# Patient Record
Sex: MALE | Race: WHITE | NOT HISPANIC OR LATINO | Employment: FULL TIME | URBAN - METROPOLITAN AREA
[De-identification: names, ages, dates, MRNs, and addresses within clinical notes are randomized per-mention and may not be internally consistent; named-entity substitution may affect disease eponyms.]

---

## 2017-08-14 ENCOUNTER — TELEPHONE (OUTPATIENT)
Dept: FAMILY MEDICINE | Facility: CLINIC | Age: 37
End: 2017-08-14

## 2017-08-14 DIAGNOSIS — K21.9 GASTROESOPHAGEAL REFLUX DISEASE WITHOUT ESOPHAGITIS: ICD-10-CM

## 2017-08-14 RX ORDER — OMEPRAZOLE 40 MG/1
40 CAPSULE, DELAYED RELEASE ORAL DAILY
Qty: 90 CAPSULE | Refills: 0 | Status: SHIPPED | OUTPATIENT
Start: 2017-08-14 | End: 2017-08-15

## 2017-08-15 ENCOUNTER — OFFICE VISIT (OUTPATIENT)
Dept: FAMILY MEDICINE | Facility: CLINIC | Age: 37
End: 2017-08-15
Payer: COMMERCIAL

## 2017-08-15 VITALS
RESPIRATION RATE: 16 BRPM | HEIGHT: 72 IN | HEART RATE: 78 BPM | SYSTOLIC BLOOD PRESSURE: 110 MMHG | TEMPERATURE: 97 F | WEIGHT: 210 LBS | BODY MASS INDEX: 28.44 KG/M2 | DIASTOLIC BLOOD PRESSURE: 70 MMHG

## 2017-08-15 DIAGNOSIS — K21.9 GASTROESOPHAGEAL REFLUX DISEASE WITHOUT ESOPHAGITIS: ICD-10-CM

## 2017-08-15 PROCEDURE — 99213 OFFICE O/P EST LOW 20 MIN: CPT | Performed by: FAMILY MEDICINE

## 2017-08-15 RX ORDER — OMEPRAZOLE 40 MG/1
40 CAPSULE, DELAYED RELEASE ORAL DAILY
Qty: 90 CAPSULE | Refills: 3 | Status: SHIPPED | OUTPATIENT
Start: 2017-08-15 | End: 2018-02-27

## 2017-08-15 NOTE — MR AVS SNAPSHOT
After Visit Summary   8/15/2017    Nahum Chiu    MRN: [id number]           Patient Information     Date Of Birth          1980        Visit Information        Provider Department      8/15/2017 3:45 PM Carlo Hudson MD [facility]        Today's Diagnoses     Gastroesophageal reflux disease without esophagitis          Care Instructions      I refilled the patient's omeprazole 40 mg #90 tablets with 3 refills.  We'll follow-up as needed.          Follow-ups after your visit        Who to contact     If you have questions or need follow up information about today's clinic visit or your schedule please contact [facility] directly at [phone].  Normal or non-critical lab and imaging results will be communicated to you by MyChart, letter or phone within 4 business days after the clinic has received the results. If you do not hear from us within 7 days, please contact the clinic through [employer]hart or phone. If you have a critical or abnormal lab result, we will notify you by phone as soon as possible.  Submit refill requests through [employer]one or call your pharmacy and they will forward the refill request to us. Please allow 3 business days for your refill to be completed.          Additional Information About Your Visit        MyChart Information     [employer]sixtyone gives you secure access to your electronic health record. If you see a primary care provider, you can also send messages to your care team and make appointments. If you have questions, please call your primary care clinic.  If you do not have a primary care provider, please call [phone] and they will assist you.        Care EveryWhere ID     This is your Care EveryWhere ID. This could be used by other organizations to access your [facility] medical records  [id number]        Your Vitals Were     Pulse Temperature Respirations Height BMI (Body Mass Index)       78 97  F  "(36.1  C) (Tympanic) 16 5' 11.5\" (1.816 m) 28.88 kg/m2        Blood Pressure from Last 3 Encounters:   08/15/17 110/70   05/09/16 124/64   02/15/16 120/70    Weight from Last 3 Encounters:   08/15/17 210 lb (95.3 kg)   05/09/16 210 lb (95.3 kg)   02/15/16 210 lb (95.3 kg)              Today, you had the following     No orders found for display         Where to get your medicines      These medications were sent to [facility]/pharmacy #[id number] - [place], [place] - [address]  [address] [facility] [address]     Phone:  [phone]     omeprazole 40 MG capsule          Primary Care Provider Office Phone # Fax #    Carlo Hudson [phone] [address] [facility] [address]        Equal Access to Services     [facility] Center[facility]AREN : Hadii aad ku hadasho Soomaali, waaxda luqadaha, qaybta kaalmada adeegyada, waxay idiin hayfareedn dot garcia . So [facility] [phone].    ATENCIÓN: Si habla español, tiene a poole disposición servicios gratuitos de asistencia lingüística. Llame al [phone].    We comply with applicable federal civil rights laws and [place] laws. We do not discriminate on the basis of race, color, national origin, age, disability sex, sexual orientation or gender identity.            Thank you!     Thank you for choosing [facility]  for your care. Our goal is always to provide you with excellent care. Hearing back from our patients is one way we can continue to improve our services. Please take a few minutes to complete the written survey that you may receive in the mail after your visit with us. Thank you!             Your Updated Medication List - Protect others around you: Learn how to safely use, store and throw away your medicines at www.disposemymeds.org.          This list is accurate as of: 8/15/17  5:29 PM.  Always use your most recent med list.                   Brand Name Dispense Instructions for use Diagnosis    omeprazole 40 MG " capsule    priLOSEC    90 capsule    Take 1 capsule (40 mg) by mouth daily    Gastroesophageal reflux disease without esophagitis

## 2017-08-15 NOTE — NURSING NOTE
"Chief Complaint   Patient presents with     Gastrophageal Reflux       Initial /70  Pulse 78  Temp 97  F (36.1  C) (Tympanic)  Resp 16  Ht 5' 11.5\" (1.816 m)  Wt 210 lb (95.3 kg)  BMI 28.88 kg/m2 Estimated body mass index is 28.88 kg/(m^2) as calculated from the following:    Height as of this encounter: 5' 11.5\" (1.816 m).    Weight as of this encounter: 210 lb (95.3 kg).  Medication Reconciliation: complete     Isabela Childs CMA      "

## 2017-08-15 NOTE — PATIENT INSTRUCTIONS
I refilled the patient's omeprazole 40 mg #90 tablets with 3 refills.  We'll follow-up as needed.

## 2017-08-15 NOTE — PROGRESS NOTES
SUBJECTIVE:                                                    Nahum Chiu is a 37 year old male who presents to clinic today for the following health issues:      GERD/Heartburn      Duration: on Omeprazole for past 15 years    Description (location/character/radiation): gerd    Intensity:  moderate    Accompanying signs and symptoms:  food getting stuck: no   nausea/vomiting/blood: no   abdominal pain: no   black/tarry or bloody stools: no :    History (similar episodes/previous evaluation): None    Precipitating or alleviating factors:  worse with no particular food or drink.  current NSAID/Aspirin use: no     Therapies tried and outcome: Omeprazole (Prilosec)            Problem list and histories reviewed & adjusted, as indicated.  Additional history: The patient did get his upper endoscopy done.  That was unremarkable.  His mother had convinced him to try ranitidine instead of omeprazole.  That didn't work as well, so when he went to the GI specialist he inquired about the use of PPIs chronically.  The GI specialist told him to go back on omeprazole and could use it chronically.    Patient Active Problem List   Diagnosis     GERD (gastroesophageal reflux disease)     Shoulder pain, right     History reviewed. No pertinent surgical history.    Social History   Substance Use Topics     Smoking status: Former Smoker     Smokeless tobacco: Never Used     Alcohol use Yes     Family History   Problem Relation Age of Onset     DIABETES No family hx of              Reviewed and updated as needed this visit by clinical staffTobacco  Allergies  Meds  Med Hx  Surg Hx  Fam Hx  Soc Hx      Reviewed and updated as needed this visit by Provider         ROS:  Constitutional, HEENT, cardiovascular, pulmonary, gi and gu systems are negative, except as otherwise noted.  CONSTITUTIONAL:NEGATIVE for fever, chills, change in weight  GI: NEGATIVE for nausea, abdominal pain, heartburn, or change in bowel  "habits      OBJECTIVE:                                                    /70  Pulse 78  Temp 97  F (36.1  C) (Tympanic)  Resp 16  Ht 5' 11.5\" (1.816 m)  Wt 210 lb (95.3 kg)  BMI 28.88 kg/m2  Body mass index is 28.88 kg/(m^2).  GENERAL APPEARANCE: healthy, alert and no distress         ASSESSMENT/PLAN:                                                        ICD-10-CM    1. Gastroesophageal reflux disease without esophagitis K21.9 omeprazole (PRILOSEC) 40 MG capsule       Patient Instructions     I refilled the patient's omeprazole 40 mg #90 tablets with 3 refills.  We'll follow-up as needed.      Carlo Hudson MD  [facility]    "

## 2017-11-18 DIAGNOSIS — K21.9 GASTROESOPHAGEAL REFLUX DISEASE WITHOUT ESOPHAGITIS: ICD-10-CM

## 2017-11-20 RX ORDER — OMEPRAZOLE 40 MG/1
CAPSULE, DELAYED RELEASE ORAL
Qty: 90 CAPSULE | Refills: 2 | Status: SHIPPED | OUTPATIENT
Start: 2017-11-20 | End: 2018-09-01

## 2018-02-26 ENCOUNTER — MYC MEDICAL ADVICE (OUTPATIENT)
Dept: FAMILY MEDICINE | Facility: CLINIC | Age: 38
End: 2018-02-26

## 2018-02-26 NOTE — TELEPHONE ENCOUNTER
Pt denies chest pain, breathing problems, dizziness or fatigue.Future appt was scheduled. Advised he limit caffeine, nicotine, or alcohol that may result on tachycardia.   Asked that pt seek emergent care if any of the symptoms above.

## 2018-02-27 ENCOUNTER — OFFICE VISIT (OUTPATIENT)
Dept: FAMILY MEDICINE | Facility: CLINIC | Age: 38
End: 2018-02-27
Payer: COMMERCIAL

## 2018-02-27 VITALS
RESPIRATION RATE: 14 BRPM | SYSTOLIC BLOOD PRESSURE: 118 MMHG | TEMPERATURE: 97.2 F | BODY MASS INDEX: 28.47 KG/M2 | HEART RATE: 78 BPM | WEIGHT: 207 LBS | DIASTOLIC BLOOD PRESSURE: 78 MMHG

## 2018-02-27 DIAGNOSIS — R00.0 TACHYCARDIA: Primary | ICD-10-CM

## 2018-02-27 LAB
BASOPHILS # BLD AUTO: 0 10E9/L (ref 0–0.2)
BASOPHILS NFR BLD AUTO: 0.4 %
DIFFERENTIAL METHOD BLD: NORMAL
EOSINOPHIL # BLD AUTO: 0.4 10E9/L (ref 0–0.7)
EOSINOPHIL NFR BLD AUTO: 6.3 %
ERYTHROCYTE [DISTWIDTH] IN BLOOD BY AUTOMATED COUNT: 12.9 % (ref 10–15)
HCT VFR BLD AUTO: 46.6 % (ref 40–53)
HGB BLD-MCNC: 15.8 G/DL (ref 13.3–17.7)
LYMPHOCYTES # BLD AUTO: 1.5 10E9/L (ref 0.8–5.3)
LYMPHOCYTES NFR BLD AUTO: 27 %
MCH RBC QN AUTO: 31 PG (ref 26.5–33)
MCHC RBC AUTO-ENTMCNC: 33.9 G/DL (ref 31.5–36.5)
MCV RBC AUTO: 92 FL (ref 78–100)
MONOCYTES # BLD AUTO: 0.4 10E9/L (ref 0–1.3)
MONOCYTES NFR BLD AUTO: 7.2 %
NEUTROPHILS # BLD AUTO: 3.4 10E9/L (ref 1.6–8.3)
NEUTROPHILS NFR BLD AUTO: 59.1 %
PLATELET # BLD AUTO: 197 10E9/L (ref 150–450)
RBC # BLD AUTO: 5.09 10E12/L (ref 4.4–5.9)
WBC # BLD AUTO: 5.7 10E9/L (ref 4–11)

## 2018-02-27 PROCEDURE — 84443 ASSAY THYROID STIM HORMONE: CPT | Performed by: FAMILY MEDICINE

## 2018-02-27 PROCEDURE — 36415 COLL VENOUS BLD VENIPUNCTURE: CPT | Performed by: FAMILY MEDICINE

## 2018-02-27 PROCEDURE — 99213 OFFICE O/P EST LOW 20 MIN: CPT | Performed by: FAMILY MEDICINE

## 2018-02-27 PROCEDURE — 84439 ASSAY OF FREE THYROXINE: CPT | Performed by: FAMILY MEDICINE

## 2018-02-27 PROCEDURE — 85025 COMPLETE CBC W/AUTO DIFF WBC: CPT | Performed by: FAMILY MEDICINE

## 2018-02-27 NOTE — MR AVS SNAPSHOT
After Visit Summary   2/27/2018    Nahum Chiu    MRN: [id number]           Patient Information     Date Of Birth          1980        Visit Information        Provider Department      2/27/2018 10:30 AM Carlo Hudson MD [facility]        Today's Diagnoses     Tachycardia    -  1      Care Instructions    Will check labs and see back if symptoms persist.          Follow-ups after your visit        Who to contact     If you have questions or need follow up information about today's clinic visit or your schedule please contact [facility] directly at [phone].  Normal or non-critical lab and imaging results will be communicated to you by MyChart, letter or phone within 4 business days after the clinic has received the results. If you do not hear from us within 7 days, please contact the clinic through [employer] or phone. If you have a critical or abnormal lab result, we will notify you by phone as soon as possible.  Submit refill requests through [employer] or call your pharmacy and they will forward the refill request to us. Please allow 3 business days for your refill to be completed.          Additional Information About Your Visit        MyChart Information     [employer] gives you secure access to your electronic health record. If you see a primary care provider, you can also send messages to your care team and make appointments. If you have questions, please call your primary care clinic.  If you do not have a primary care provider, please call [phone] and they will assist you.        Care EveryWhere ID     This is your Care EveryWhere ID. This could be used by other organizations to access your [facility] medical records  [id number]        Your Vitals Were     Pulse Temperature Respirations BMI (Body Mass Index)          78 97.2  F (36.2  C) (Tympanic) 14 28.47 kg/m2         Blood Pressure from Last 3 Encounters:    02/27/18 118/78   08/15/17 110/70   05/09/16 124/64    Weight from Last 3 Encounters:   02/27/18 207 lb (93.9 kg)   08/15/17 210 lb (95.3 kg)   05/09/16 210 lb (95.3 kg)              We Performed the Following     CBC with platelets differential     T4, free     TSH          Today's Medication Changes          These changes are accurate as of 2/27/18 11:13 AM.  If you have any questions, ask your nurse or doctor.               These medicines have changed or have updated prescriptions.        Dose/Directions    omeprazole 40 MG capsule   Commonly known as:  priLOSEC   This may have changed:  Another medication with the same name was removed. Continue taking this medication, and follow the directions you see here.   Used for:  Gastroesophageal reflux disease without esophagitis   Changed by:  Carlo Hudson MD        TAKE 1 CAPSULE (40 MG) BY MOUTH DAILY   Quantity:  90 capsule   Refills:  2                Primary Care Provider Office Phone # Fax #    Carlo Hudson [phone]       [address] [address]        Equal Access to Services     [facility]EN : Hadii luis m ku hadasho Soomaali, waaxda luqadaha, qaybta kaalmada timothy, ranjith garcia . So [facility] [phone].    ATENCIÓN: Si habla español, tiene a poole disposición servicios gratuitos de asistencia lingüística. Stephon al [phone].    We comply with applicable federal civil rights laws and [place] laws. We do not discriminate on the basis of race, color, national origin, age, disability, sex, sexual orientation, or gender identity.            Thank you!     Thank you for choosing [facility]  for your care. Our goal is always to provide you with excellent care. Hearing back from our patients is one way we can continue to improve our services. Please take a few minutes to complete the written survey that you may receive in the mail after your visit  with us. Thank you!             Your Updated Medication List - Protect others around you: Learn how to safely use, store and throw away your medicines at www.disposemymeds.org.          This list is accurate as of 2/27/18 11:13 AM.  Always use your most recent med list.                   Brand Name Dispense Instructions for use Diagnosis    omeprazole 40 MG capsule    priLOSEC    90 capsule    TAKE 1 CAPSULE (40 MG) BY MOUTH DAILY    Gastroesophageal reflux disease without esophagitis

## 2018-02-27 NOTE — NURSING NOTE
"Chief Complaint   Patient presents with     Palpitations     tachycardia        Initial /78  Pulse 78  Temp 97.2  F (36.2  C) (Tympanic)  Resp 14  Wt 207 lb (93.9 kg)  BMI 28.47 kg/m2 Estimated body mass index is 28.47 kg/(m^2) as calculated from the following:    Height as of 8/15/17: 5' 11.5\" (1.816 m).    Weight as of this encounter: 207 lb (93.9 kg).  Medication Reconciliation: complete   Edda David MA student     "

## 2018-02-27 NOTE — PROGRESS NOTES
SUBJECTIVE:   Nahum Chiu is a 37 year old male who presents to clinic today for the following health issues:      Palpitations/Tachycardia       Duration: 1 week    Description (location/character/radiation): pulse per fitness watch 110-130 even while sleeping    Intensity:  mild    Accompanying signs and symptoms: chest pains about a week ago while at work    History (similar episodes/previous evaluation): None    Precipitating or alleviating factors: None    Therapies tried and outcome: None           Problem list and histories reviewed & adjusted, as indicated.  Additional history: as documented    Patient Active Problem List   Diagnosis     GERD (gastroesophageal reflux disease)     Shoulder pain, right     History reviewed. No pertinent surgical history.    Social History   Substance Use Topics     Smoking status: Former Smoker     Smokeless tobacco: Never Used     Alcohol use Yes     Family History   Problem Relation Age of Onset     DIABETES No family hx of            Reviewed and updated as needed this visit by clinical staff  Tobacco  Allergies  Meds  Med Hx  Surg Hx  Fam Hx  Soc Hx      Reviewed and updated as needed this visit by Provider         ROS:  Constitutional, HEENT, cardiovascular, pulmonary, gi and gu systems are negative, except as otherwise noted.    OBJECTIVE:                                                    /78  Pulse 78  Temp 97.2  F (36.2  C) (Tympanic)  Resp 14  Wt 207 lb (93.9 kg)  BMI 28.47 kg/m2  Body mass index is 28.47 kg/(m^2).  GENERAL APPEARANCE: healthy, alert and no distress  RESP: lungs clear to auscultation - no rales, rhonchi or wheezes  CV: regular rates and rhythm, normal S1 S2, no S3 or S4 and no murmur, click or rub         ASSESSMENT/PLAN:                                                        ICD-10-CM    1. Tachycardia R00.0 CBC with platelets differential     TSH     T4, free       Patient Instructions   Will check labs and see back if symptoms  persist.      Carlo Hudson MD  [facility]

## 2018-02-28 LAB
T4 FREE SERPL-MCNC: 0.88 NG/DL (ref 0.76–1.46)
TSH SERPL DL<=0.005 MIU/L-ACNC: 1.55 MU/L (ref 0.4–4)

## 2018-03-01 NOTE — PROGRESS NOTES
Dear Nahum,    Your tests were all normal. A copy of your tests are available in My Chart.      If your problems persist please let me know and we will do further testing.    Glad to see you at your appointment.  If you have any questions feel free to call.      Sincerely,      SHUN Adan.

## 2018-09-01 DIAGNOSIS — K21.9 GASTROESOPHAGEAL REFLUX DISEASE WITHOUT ESOPHAGITIS: ICD-10-CM

## 2018-09-01 NOTE — TELEPHONE ENCOUNTER
"Requested Prescriptions   Pending Prescriptions Disp Refills     omeprazole (PRILOSEC) 40 MG capsule [Pharmacy Med Name: OMEPRAZOLE DR 40 MG CAPSULE]  Last Written Prescription Date:  11/20/2017  Last Fill Quantity: 90,  # refills: 1   Last office visit: 2/27/2018 with prescribing provider:  CHELE   Future Office Visit:     90 capsule 2     Sig: TAKE 1 CAPSULE (40 MG) BY MOUTH DAILY    PPI Protocol Passed    9/1/2018  1:36 AM       Passed - Not on Clopidogrel (unless Pantoprazole ordered)       Passed - No diagnosis of osteoporosis on record       Passed - Recent (12 mo) or future (30 days) visit within the authorizing provider's specialty    Patient had office visit in the last 12 months or has a visit in the next 30 days with authorizing provider or within the authorizing provider's specialty.  See \"Patient Info\" tab in inbasket, or \"Choose Columns\" in Meds & Orders section of the refill encounter.           Passed - Patient is age 18 or older          "

## 2018-09-04 RX ORDER — OMEPRAZOLE 40 MG/1
CAPSULE, DELAYED RELEASE ORAL
Qty: 90 CAPSULE | Refills: 1 | Status: SHIPPED | OUTPATIENT
Start: 2018-09-04 | End: 2019-03-16

## 2019-03-16 DIAGNOSIS — K21.9 GASTROESOPHAGEAL REFLUX DISEASE WITHOUT ESOPHAGITIS: ICD-10-CM

## 2019-03-18 RX ORDER — OMEPRAZOLE 40 MG/1
CAPSULE, DELAYED RELEASE ORAL
Qty: 90 CAPSULE | Refills: 0 | Status: SHIPPED | OUTPATIENT
Start: 2019-03-18 | End: 2019-07-13

## 2019-03-18 NOTE — TELEPHONE ENCOUNTER
Medication is being filled for 1 time refill only due to:  Patient needs to be seen because it has been more than one year since last visit.     Kayla Lui RN- Triage [employer]

## 2019-03-18 NOTE — TELEPHONE ENCOUNTER
"Requested Prescriptions   Pending Prescriptions Disp Refills     omeprazole (PRILOSEC) 40 MG DR capsule [Pharmacy Med Name: OMEPRAZOLE DR 40 MG CAPSULE]  Last Written Prescription Date:  9/4/2018  Last Fill Quantity: 90 capsule,  # refills: 1   Last office visit: 2/27/2018 with prescribing provider:  Tristan   Future Office Visit:     90 capsule 1     Sig: TAKE 1 CAPSULE (40 MG) BY MOUTH DAILY    PPI Protocol Failed - 3/16/2019  8:30 AM       Failed - Recent (12 mo) or future (30 days) visit within the authorizing provider's specialty    Patient had office visit in the last 12 months or has a visit in the next 30 days with authorizing provider or within the authorizing provider's specialty.  See \"Patient Info\" tab in inbasket, or \"Choose Columns\" in Meds & Orders section of the refill encounter.             Passed - Not on Clopidogrel (unless Pantoprazole ordered)       Passed - No diagnosis of osteoporosis on record       Passed - Medication is active on med list       Passed - Patient is age 18 or older           "

## 2019-03-26 ENCOUNTER — OFFICE VISIT (OUTPATIENT)
Dept: FAMILY MEDICINE | Facility: CLINIC | Age: 39
End: 2019-03-26
Payer: COMMERCIAL

## 2019-03-26 VITALS
OXYGEN SATURATION: 97 % | HEART RATE: 54 BPM | SYSTOLIC BLOOD PRESSURE: 112 MMHG | BODY MASS INDEX: 29.53 KG/M2 | DIASTOLIC BLOOD PRESSURE: 76 MMHG | RESPIRATION RATE: 16 BRPM | WEIGHT: 218 LBS | TEMPERATURE: 97.4 F | HEIGHT: 72 IN

## 2019-03-26 DIAGNOSIS — G44.209 TENSION HEADACHE: Primary | ICD-10-CM

## 2019-03-26 PROCEDURE — 99213 OFFICE O/P EST LOW 20 MIN: CPT | Performed by: FAMILY MEDICINE

## 2019-03-26 ASSESSMENT — MIFFLIN-ST. JEOR: SCORE: 1936.84

## 2019-03-26 NOTE — PATIENT INSTRUCTIONS
We will treat symptomatically.  Patient will use extra strength Tylenol on an as-needed basis.  He may use up to 8 of those in a 24-hour..  This may have started as a caffeine withdrawal headache but I think it is now pretty classic tension type headache.  There is nothing on physical exam that would suggest other etiologies at present.  If the headaches persist and do not get well treated with acetaminophen the patient will return for further exam and testing.  The patient was instructed not to use ibuprofen due to his GERD.  That has been well controlled on omeprazole.

## 2019-03-26 NOTE — PROGRESS NOTES
SUBJECTIVE:   Nahum Chiu is a 38 year old male who presents to clinic today for the following health issues:      Headaches      Duration: a couple weeks    Description  Location: back of head   Character:  dull pain  Frequency:  Daily   Duration:  hours    Intensity:  moderate    Accompanying signs and symptoms: cloudiness     Precipitating or Alleviating factors:  Nausea/vomiting: no  Dizziness: sometimes  Weakness or numbness: no  Visual changes: cloudiness  Fever: no   Sinus or URI symptoms no     History  Head trauma: no  Family history of migraines: no  Previous tests for headaches: no  Neurologist evaluations: no  Able to do daily activities when headache present: YES  Wake with headaches: YES  Daily pain medication use: no  Any changes in: pt cut back on caffine    Precipitating or Alleviating factors (light/sound/sleep/caffeine):     Therapies tried and outcome: Ibuprofen (Advil, Motrin), Tylenol and Excedrin    Outcome -may be helpful for little bit for like a few hours.  Frequent/daily pain medication use: no        GERD/Heartburn      Duration: A couple of years    Description (location/character/radiation): Midepigastrium    Intensity:  mild    Accompanying signs and symptoms:  food getting stuck: no   nausea/vomiting/blood: no   abdominal pain: no   black/tarry or bloody stools: no :    History (similar episodes/previous evaluation): None    Precipitating or alleviating factors:  worse with no particular food or drink.  current NSAID/Aspirin use: YES    Therapies tried and outcome: Omeprazole (Prilosec)      Problem list and histories reviewed & adjusted, as indicated.  Additional history: as documented    Patient Active Problem List   Diagnosis     GERD (gastroesophageal reflux disease)     Shoulder pain, right     Past Surgical History:   Procedure Laterality Date     HERNIA REPAIR  1995       Social History     Tobacco Use     Smoking status: Former Smoker     Packs/day: 1.00     Years: 13.00      Pack years: 13.00     Types: Cigarettes, Cigars, Dip, chew, snus or snuff     Start date: 9/1/1996     Last attempt to quit: 2/1/2009     Years since quitting: 10.1     Smokeless tobacco: Never Used   Substance Use Topics     Alcohol use: Yes     Family History   Problem Relation Age of Onset     Diabetes No family hx of            Reviewed and updated as needed this visit by clinical staff  Tobacco  Allergies  Meds  Med Hx  Surg Hx  Fam Hx  Soc Hx      Reviewed and updated as needed this visit by Provider         ROS:  Constitutional, HEENT, cardiovascular, pulmonary, gi and gu systems are negative, except as otherwise noted.  Constitutional, neuro, ENT, endocrine, pulmonary, cardiac, gastrointestinal, genitourinary, musculoskeletal, integument and psychiatric systems are negative, except as otherwise noted.    OBJECTIVE:                                                    /76   Pulse 54   Temp 97.4  F (36.3  C)   Resp 16   Ht 1.829 m (6')   Wt 98.9 kg (218 lb)   SpO2 97%   BMI 29.57 kg/m    Body mass index is 29.57 kg/m .  GENERAL APPEARANCE: healthy, alert and no distress  EYES: Eyes grossly normal to inspection, PERRL and conjunctivae and sclerae normal  NEURO: Normal strength and tone, mentation intact, speech normal, DTR symmetrically normal in lower extremities, cranial nerves 2-12 intact and normal gait         ASSESSMENT/PLAN:                                                        ICD-10-CM    1. Tension headache G44.209        Patient Instructions   We will treat symptomatically.  Patient will use extra strength Tylenol on an as-needed basis.  He may use up to 8 of those in a 24-hour..  This may have started as a caffeine withdrawal headache but I think it is now pretty classic tension type headache.  There is nothing on physical exam that would suggest other etiologies at present.  If the headaches persist and do not get well treated with acetaminophen the patient will return for  further exam and testing.  The patient was instructed not to use ibuprofen due to his GERD.  That has been well controlled on omeprazole.      Carlo Hudson MD  [facility]

## 2019-06-30 DIAGNOSIS — R09.81 CONGESTION OF PARANASAL SINUS: Primary | ICD-10-CM

## 2019-07-13 DIAGNOSIS — K21.9 GASTROESOPHAGEAL REFLUX DISEASE WITHOUT ESOPHAGITIS: ICD-10-CM

## 2019-07-15 RX ORDER — OMEPRAZOLE 40 MG/1
CAPSULE, DELAYED RELEASE ORAL
Qty: 90 CAPSULE | Refills: 1 | Status: SHIPPED | OUTPATIENT
Start: 2019-07-15 | End: 2019-10-22

## 2019-07-15 NOTE — TELEPHONE ENCOUNTER
"Requested Prescriptions   Pending Prescriptions Disp Refills     omeprazole (PRILOSEC) 40 MG DR capsule [Pharmacy Med Name: OMEPRAZOLE DR 40 MG CAPSULE]  Last Written Prescription Date:  3/18/2019  Last Fill Quantity: 90 capsule,  # refills: 0   Last office visit: 3/26/2019 with prescribing provider:  Tristan   Future Office Visit:     90 capsule 0     Sig: TAKE 1 CAPSULE BY MOUTH EVERY DAY       PPI Protocol Passed - 7/13/2019  2:14 PM        Passed - Not on Clopidogrel (unless Pantoprazole ordered)        Passed - No diagnosis of osteoporosis on record        Passed - Recent (12 mo) or future (30 days) visit within the authorizing provider's specialty     Patient had office visit in the last 12 months or has a visit in the next 30 days with authorizing provider or within the authorizing provider's specialty.  See \"Patient Info\" tab in inbasket, or \"Choose Columns\" in Meds & Orders section of the refill encounter.              Passed - Medication is active on med list        Passed - Patient is age 18 or older           "

## 2019-08-12 ENCOUNTER — OFFICE VISIT (OUTPATIENT)
Dept: FAMILY MEDICINE | Facility: CLINIC | Age: 39
End: 2019-08-12
Payer: COMMERCIAL

## 2019-08-12 VITALS
TEMPERATURE: 97.3 F | SYSTOLIC BLOOD PRESSURE: 118 MMHG | DIASTOLIC BLOOD PRESSURE: 80 MMHG | OXYGEN SATURATION: 96 % | WEIGHT: 219.5 LBS | BODY MASS INDEX: 29.77 KG/M2 | HEART RATE: 61 BPM

## 2019-08-12 DIAGNOSIS — K59.00 CONSTIPATION, UNSPECIFIED CONSTIPATION TYPE: ICD-10-CM

## 2019-08-12 DIAGNOSIS — K21.9 GASTROESOPHAGEAL REFLUX DISEASE WITHOUT ESOPHAGITIS: ICD-10-CM

## 2019-08-12 DIAGNOSIS — M53.3 PAIN IN THE COCCYX: Primary | ICD-10-CM

## 2019-08-12 PROCEDURE — 99213 OFFICE O/P EST LOW 20 MIN: CPT | Performed by: FAMILY MEDICINE

## 2019-08-12 NOTE — PROGRESS NOTES
Subjective     Nahum Chiu is a 39 year old male who presents to clinic today for the following health issues:    HPI   tailbone      Duration: 3 weeks    Description (location/character/radiation): tailbone, rectum    Intensity:  mild    Accompanying signs and symptoms: uncomfortable to sit, ache, low back    History (similar episodes/previous evaluation): None    Precipitating or alleviating factors: sitting on something, pt had acouple of long car rides    Therapies tried and outcome: The patient has tried to improve his posture and thinks that that may have had some beneficial effect.     Constipation      Duration: 2-1/2 years    Description:       Frequency of bowel movements: 1-2/day       Consistency of stool: Slightly firm    Intensity:  mild    Accompanying signs and symptoms: Some straining       Abdominal pain: no        Rectal pain: no        Blood in stool: no        Nausea/vomitting: no     History:        Similar problems in past: YES    Precipitating or alleviating factors: Patient is currently on Citrucel with some improvement       Medications worsening symptoms: no     Therapies tried and outcome: None       Chronic laxative use: no       Patient Active Problem List   Diagnosis     GERD (gastroesophageal reflux disease)     Shoulder pain, right     Pain in the coccyx     Constipation, unspecified constipation type     Past Surgical History:   Procedure Laterality Date     HERNIA REPAIR  1995       Social History     Tobacco Use     Smoking status: Former Smoker     Packs/day: 1.00     Years: 13.00     Pack years: 13.00     Types: Cigarettes, Cigars, Dip, chew, snus or snuff     Start date: 9/1/1996     Last attempt to quit: 2/1/2009     Years since quitting: 10.5     Smokeless tobacco: Never Used   Substance Use Topics     Alcohol use: Yes     Family History   Problem Relation Age of Onset     Diabetes No family hx of              Reviewed and updated as needed this visit by Provider          Review of Systems   ROS COMP: Constitutional, HEENT, cardiovascular, pulmonary, gi and gu systems are negative, except as otherwise noted.      Objective    /80 (Cuff Size: Adult Large)   Pulse 61   Temp 97.3  F (36.3  C) (Tympanic)   Wt 99.6 kg (219 lb 8 oz)   SpO2 96%   BMI 29.77 kg/m    Body mass index is 29.77 kg/m .  Physical Exam   GENERAL APPEARANCE: healthy, alert and no distress  ABDOMEN: bowel sounds normal   (male): Inspection of the rectum was normal.  Palpation along both sides of the crease of  the buttocks was nontender.  Patient said that on the right side there might be just a little bit of discomfort to palpation.  Bony structures were nontender both from external palpation and from internal palpation when I did his rectal exam.  SKIN: no suspicious lesions or rashes            Assessment & Plan       ICD-10-CM    1. Pain in the coccyx M53.3    2. Gastroesophageal reflux disease without esophagitis K21.9    3. Constipation, unspecified constipation type K59.00         BMI:   Estimated body mass index is 29.77 kg/m  as calculated from the following:    Height as of 3/26/19: 1.829 m (6').    Weight as of this encounter: 99.6 kg (219 lb 8 oz).           Patient Instructions   The patient's exam today did not actually show any tenderness to the tailbone itself.  He did have some discomfort just to the right of the bony part of the tailbone.  Given the fact that he is still having some problems with a little bit of constipation we opted to increase his Citrucel to 2-1/2 tablespoons once a day.  He will also try some extra strength Tylenol for the discomfort around the tailbone.  If he starts to develop any sort of cyst like or mushy area around the tailbone he will let me know so we can get him off to someone to be examined for a possible pilonidal cyst.  I did not feel any cyst today.  However, given the fact that his discomfort is just to the right of midline in the buttocks crease  this could very well be an early cyst.  Patient was agreeable to the plan.      No follow-ups on file.    Carlo Hudson MD  [facility]

## 2019-08-12 NOTE — PATIENT INSTRUCTIONS
The patient's exam today did not actually show any tenderness to the tailbone itself.  He did have some discomfort just to the right of the bony part of the tailbone.  Given the fact that he is still having some problems with a little bit of constipation we opted to increase his Citrucel to 2-1/2 tablespoons once a day.  He will also try some extra strength Tylenol for the discomfort around the tailbone.  If he starts to develop any sort of cyst like or mushy area around the tailbone he will let me know so we can get him off to someone to be examined for a possible pilonidal cyst.  I did not feel any cyst today.  However, given the fact that his discomfort is just to the right of midline in the buttocks crease this could very well be an early cyst.  Patient was agreeable to the plan.

## 2019-10-22 ENCOUNTER — MYC MEDICAL ADVICE (OUTPATIENT)
Dept: FAMILY MEDICINE | Facility: CLINIC | Age: 39
End: 2019-10-22

## 2019-10-22 DIAGNOSIS — K21.9 GASTROESOPHAGEAL REFLUX DISEASE WITHOUT ESOPHAGITIS: ICD-10-CM

## 2019-10-22 NOTE — TELEPHONE ENCOUNTER
"Requested Prescriptions   Pending Prescriptions Disp Refills     omeprazole (PRILOSEC) 40 MG DR capsule  Last Written Prescription Date:  7/15/2019  Last Fill Quantity: 90 capsule,  # refills: 1   Last office visit: 8/12/2019 with prescribing provider:  Tristan   Future Office Visit:     90 capsule 1       PPI Protocol Passed - 10/22/2019  2:13 PM        Passed - Not on Clopidogrel (unless Pantoprazole ordered)        Passed - No diagnosis of osteoporosis on record        Passed - Recent (12 mo) or future (30 days) visit within the authorizing provider's specialty     Patient has had an office visit with the authorizing provider or a provider within the authorizing providers department within the previous 12 mos or has a future within next 30 days. See \"Patient Info\" tab in inbasket, or \"Choose Columns\" in Meds & Orders section of the refill encounter.              Passed - Medication is active on med list        Passed - Patient is age 18 or older           "

## 2019-10-23 RX ORDER — OMEPRAZOLE 40 MG/1
CAPSULE, DELAYED RELEASE ORAL
Qty: 90 CAPSULE | Refills: 2 | Status: SHIPPED | OUTPATIENT
Start: 2019-10-23 | End: 2020-08-03

## 2020-03-02 ENCOUNTER — HEALTH MAINTENANCE LETTER (OUTPATIENT)
Age: 40
End: 2020-03-02

## 2020-03-17 ENCOUNTER — OFFICE VISIT (OUTPATIENT)
Dept: FAMILY MEDICINE | Facility: CLINIC | Age: 40
End: 2020-03-17
Payer: COMMERCIAL

## 2020-03-17 VITALS
WEIGHT: 190 LBS | HEART RATE: 64 BPM | RESPIRATION RATE: 20 BRPM | HEIGHT: 72 IN | OXYGEN SATURATION: 98 % | SYSTOLIC BLOOD PRESSURE: 120 MMHG | TEMPERATURE: 97.8 F | DIASTOLIC BLOOD PRESSURE: 70 MMHG | BODY MASS INDEX: 25.73 KG/M2

## 2020-03-17 DIAGNOSIS — K21.9 GASTROESOPHAGEAL REFLUX DISEASE WITHOUT ESOPHAGITIS: Primary | ICD-10-CM

## 2020-03-17 DIAGNOSIS — R09.81 CONGESTION OF PARANASAL SINUS: ICD-10-CM

## 2020-03-17 PROCEDURE — 99214 OFFICE O/P EST MOD 30 MIN: CPT | Performed by: FAMILY MEDICINE

## 2020-03-17 PROCEDURE — 36415 COLL VENOUS BLD VENIPUNCTURE: CPT | Performed by: FAMILY MEDICINE

## 2020-03-17 PROCEDURE — 80061 LIPID PANEL: CPT | Performed by: FAMILY MEDICINE

## 2020-03-17 ASSESSMENT — MIFFLIN-ST. JEOR: SCORE: 1804.83

## 2020-03-17 NOTE — PROGRESS NOTES
Subjective     Nahum Chiu is a 39 year old male who presents to clinic today for the following health issues:    HPI     Hyperlipidemia Follow-Up      Are you regularly taking any medication or supplement to lower your cholesterol?   No    Are you having muscle aches or other side effects that you think could be caused by your cholesterol lowering medication?  No      How many servings of fruits and vegetables do you eat daily?  4 or more    On average, how many sweetened beverages do you drink each day (Examples: soda, juice, sweet tea, etc.  Do NOT count diet or artificially sweetened beverages)?   0    How many days per week do you exercise enough to make your heart beat faster? 6    How many minutes a day do you exercise enough to make your heart beat faster? 30 - 60    How many days per week do you miss taking your medication? 0    GERD/Heartburn      Duration: 20 years    Description (location/character/radiation): Mid epigastric pain    Intensity:  mild    Accompanying signs and symptoms:  food getting stuck: no   nausea/vomiting/blood: no   abdominal pain: no   black/tarry or bloody stools: no :    History (similar episodes/previous evaluation): Yes and had upper endoscopy done 4 years ago    Precipitating or alleviating factors:  worse with no particular food or drink.  current NSAID/Aspirin use: no     Therapies tried and outcome: Omeprazole (Prilosec)      Patient Active Problem List   Diagnosis     GERD (gastroesophageal reflux disease)     Shoulder pain, right     Pain in the coccyx     Constipation, unspecified constipation type     Past Surgical History:   Procedure Laterality Date     HERNIA REPAIR         Social History     Tobacco Use     Smoking status: Former Smoker     Packs/day: 1.00     Years: 13.00     Pack years: 13.00     Types: Cigarettes, Cigars, Dip, chew, snus or snuff     Start date: 1996     Last attempt to quit: 2009     Years since quittin.1     Smokeless  "tobacco: Never Used   Substance Use Topics     Alcohol use: Yes     Family History   Problem Relation Age of Onset     Diabetes No family hx of              Reviewed and updated as needed this visit by Provider         Review of Systems   ROS COMP: Constitutional, HEENT, cardiovascular, pulmonary, gi and gu systems are negative, except as otherwise noted.  Patient has been having some chest discomfort from time to time that radiates into his back.  He is also complaining of sinus congestion and was told he had a deviated septum, and was also told that he should consider getting \"Roto-Rooter\" done to his sinuses.  He has not done any of that.  The last time he saw an ENT was about 8 years ago.  That was in [place].      Objective    /70   Pulse 64   Temp 97.8  F (36.6  C) (Tympanic)   Resp 20   Ht 1.829 m (6')   Wt 86.2 kg (190 lb)   SpO2 98%   BMI 25.77 kg/m    Body mass index is 25.77 kg/m .  Physical Exam   GENERAL APPEARANCE: healthy, alert and no distress  RESP: lungs clear to auscultation - no rales, rhonchi or wheezes  ABDOMEN: bowel sounds normal, no palpable masses and mild tenderness to palpation in the midepigastrium.  He has been getting a little bit of chest discomfort that radiates into his back from time to time.             Assessment & Plan       ICD-10-CM    1. Gastroesophageal reflux disease without esophagitis  K21.9 Lipid panel reflex to direct LDL Fasting     GASTROENTEROLOGY ADULT REF PROCEDURE ONLY   2. Congestion of paranasal sinus  R09.81 OTOLARYNGOLOGY REFERRAL        BMI:   Estimated body mass index is 25.77 kg/m  as calculated from the following:    Height as of this encounter: 1.829 m (6').    Weight as of this encounter: 86.2 kg (190 lb).           Patient Instructions   I recommended to the patient that if he is having chest discomfort and he has already taken his omeprazole for the day that he could take 2 tablespoons of liquid antacid to see if it gets rid of his " symptoms.  We will check a lipid panel on him today.  I set him up for an upper endoscopy to look down into his esophagus.  I also referred him to ENT about his chronic sinus congestion.      Return in about 3 months (around 6/17/2020) for GERD.    Carlo Hudson MD  [facility]

## 2020-03-17 NOTE — PATIENT INSTRUCTIONS
I recommended to the patient that if he is having chest discomfort and he has already taken his omeprazole for the day that he could take 2 tablespoons of liquid antacid to see if it gets rid of his symptoms.  We will check a lipid panel on him today.  I set him up for an upper endoscopy to look down into his esophagus.  I also referred him to ENT about his chronic sinus congestion.

## 2020-03-18 LAB
CHOLEST SERPL-MCNC: 196 MG/DL
HDLC SERPL-MCNC: 59 MG/DL
LDLC SERPL CALC-MCNC: 109 MG/DL
NONHDLC SERPL-MCNC: 137 MG/DL
TRIGL SERPL-MCNC: 140 MG/DL

## 2020-03-19 NOTE — RESULT ENCOUNTER NOTE
Dear Nahum,    Your tests were all normal. A copy of your tests are available in My Chart.    Glad to see you at your appointment.  If you have any questions feel free to call.      Sincerely,      SHUN Adan.

## 2020-03-26 ENCOUNTER — HOSPITAL ENCOUNTER (OUTPATIENT)
Facility: CLINIC | Age: 40
Discharge: HOME | End: 2020-03-26
Attending: SPECIALIST | Admitting: SPECIALIST
Payer: COMMERCIAL

## 2020-03-26 VITALS
HEART RATE: 54 BPM | OXYGEN SATURATION: 94 % | SYSTOLIC BLOOD PRESSURE: 106 MMHG | TEMPERATURE: 98.2 F | DIASTOLIC BLOOD PRESSURE: 76 MMHG | RESPIRATION RATE: 8 BRPM

## 2020-03-26 LAB — UPPER GI ENDOSCOPY: NORMAL

## 2020-03-26 PROCEDURE — 88342 IMHCHEM/IMCYTCHM 1ST ANTB: CPT | Performed by: SPECIALIST

## 2020-03-26 PROCEDURE — 88305 TISSUE EXAM BY PATHOLOGIST: CPT | Mod: 26 | Performed by: SPECIALIST

## 2020-03-26 PROCEDURE — 25000128 H RX IP 250 OP 636: Performed by: SPECIALIST

## 2020-03-26 PROCEDURE — 88342 IMHCHEM/IMCYTCHM 1ST ANTB: CPT | Mod: 26 | Performed by: SPECIALIST

## 2020-03-26 PROCEDURE — 25000125 ZZHC RX 250: Performed by: SPECIALIST

## 2020-03-26 PROCEDURE — 88305 TISSUE EXAM BY PATHOLOGIST: CPT | Performed by: SPECIALIST

## 2020-03-26 PROCEDURE — 43239 EGD BIOPSY SINGLE/MULTIPLE: CPT | Performed by: SPECIALIST

## 2020-03-26 PROCEDURE — 40000104 ZZH STATISTIC MODERATE SEDATION < 10 MIN: Performed by: SPECIALIST

## 2020-03-26 RX ORDER — LIDOCAINE 40 MG/G
CREAM TOPICAL
Status: DISCONTINUED | OUTPATIENT
Start: 2020-03-26 | End: 2020-03-26 | Stop reason: HOSPADM

## 2020-03-26 RX ORDER — ONDANSETRON 2 MG/ML
4 INJECTION INTRAMUSCULAR; INTRAVENOUS
Status: DISCONTINUED | OUTPATIENT
Start: 2020-03-26 | End: 2020-03-26 | Stop reason: HOSPADM

## 2020-03-26 RX ORDER — FENTANYL CITRATE 50 UG/ML
INJECTION, SOLUTION INTRAMUSCULAR; INTRAVENOUS PRN
Status: DISCONTINUED | OUTPATIENT
Start: 2020-03-26 | End: 2020-03-26 | Stop reason: HOSPADM

## 2020-03-26 RX ORDER — CETIRIZINE HYDROCHLORIDE 10 MG/1
10 TABLET ORAL DAILY
COMMUNITY
End: 2020-08-17

## 2020-03-26 NOTE — H&P
Pre-Endoscopy History and Physical     Nahum Chiu MRN# [id number]   YOB: 1980 Age: 39 year old     Date of Procedure: 3/26/2020  Primary care provider: Carlo Hudson  Type of Endoscopy: Gastroscopy with possible biopsy, possible dilation  Reason for Procedure: epigastric aned chest pain  Type of Anesthesia Anticipated: Conscious Sedation    HPI:    Nahum is a 39 year old male who will be undergoing the above procedure.      A history and physical has been performed. The patient's medications and allergies have been reviewed. The risks and benefits of the procedure and the sedation options and risks were discussed with the patient.  All questions were answered and informed consent was obtained.      He denies a personal or family history of anesthesia complications or bleeding disorders.     Patient Active Problem List   Diagnosis     GERD (gastroesophageal reflux disease)     Shoulder pain, right     Pain in the coccyx     Constipation, unspecified constipation type        Past Medical History:   Diagnosis Date     Environmental allergies      GERD (gastroesophageal reflux disease)         Past Surgical History:   Procedure Laterality Date     HERNIA REPAIR         Social History     Tobacco Use     Smoking status: Former Smoker     Packs/day: 1.00     Years: 13.00     Pack years: 13.00     Types: Cigarettes, Cigars, Dip, chew, snus or snuff     Start date: 1996     Last attempt to quit: 2009     Years since quittin.1     Smokeless tobacco: Never Used   Substance Use Topics     Alcohol use: Yes     Comment: minimal use       Family History   Problem Relation Age of Onset     Diabetes No family hx of        Prior to Admission medications    Medication Sig Start Date End Date Taking? Authorizing Provider   cetirizine (ZYRTEC) 10 MG tablet Take 10 mg by mouth daily   Yes Reported, Patient   omeprazole (PRILOSEC) 40 MG DR capsule TAKE 1 CAPSULE BY MOUTH EVERY DAY 10/23/19  Yes  Carlo Hudson MD       No Known Allergies     REVIEW OF SYSTEMS:   5 point ROS negative except as noted above in HPI, including Gen., Resp., CV, GI &  system review.    PHYSICAL EXAM:   /87   Pulse 60   Resp 16   SpO2 97%  Estimated body mass index is 25.77 kg/m  as calculated from the following:    Height as of 3/17/20: 1.829 m (6').    Weight as of 3/17/20: 86.2 kg (190 lb).   GENERAL APPEARANCE: alert, and oriented  MENTAL STATUS: alert  AIRWAY EXAM: Mallampatti Class I (visualization of the soft palate, fauces, uvula, anterior and posterior pillars)  RESP: lungs clear to auscultation - no rales, rhonchi or wheezes  CV: regular rates and rhythm  DIAGNOSTICS:    Not indicated    IMPRESSION   ASA Class 1 - Healthy patient, no medical problems    PLAN:   Plan for Gastroscopy with possible biopsy, possible dilation. We discussed the risks, benefits and alternatives and the patient wished to proceed.    The above has been forwarded to the consulting provider.      Signed Electronically by: Kendell Riley MD  March 26, 2020

## 2020-03-30 LAB — COPATH REPORT: NORMAL

## 2020-04-02 NOTE — RESULT ENCOUNTER NOTE
Assessment: The foregut appeared normal and the histopathology shows mild chronic inactive gastritis. This is one of the most common pathologic findings in gastric biopsies.  It may or may not be associated with symptoms; since a cause was not identified, therapy is generally directed at alleviation of symptoms. There are no complications of gastroesophageal reflux.     Recommendations: Follow up with primary caregiver.

## 2020-05-05 ENCOUNTER — VIRTUAL VISIT (OUTPATIENT)
Dept: FAMILY MEDICINE | Facility: OTHER | Age: 40
End: 2020-05-05

## 2020-05-05 NOTE — PROGRESS NOTES
"Date: 2020 07:25:05  Clinician: Brook Castrejon  Clinician NPI: [id number]  Patient: Nahum Chiu  Patient : 1980  Patient Address: [address] [place], [place] [address]  Patient Phone: [phone]  Visit Protocol: URI  Patient Summary:  Nahum is a 39 year old ( : 1980 ) male who initiated a Visit for COVID-19 (Coronavirus) evaluation and screening. When asked the question \"Please sign me up to receive news, health information and promotions. \", Nahum responded \"No\".    Nahum states his symptoms started 1-2 days ago.   His symptoms consist of myalgia, rhinitis, and nasal congestion. He is experiencing mild difficulty breathing with activities but can speak normally in full sentences.   Symptom details   Nasal secretions: The color of his mucus is clear.   Nahum denies having fever, facial pain or pressure, sore throat, cough, vomiting, nausea, teeth pain, ageusia, anosmia, diarrhea, ear pain, headache, malaise, wheezing, and chills. He also denies taking antibiotic medication for the symptoms and having recent facial or sinus surgery in the past 60 days.   Precipitating events  He has not recently been exposed to someone with influenza. Nahum has been in close contact with the following high risk individuals: people with asthma, heart disease or diabetes.   Pertinent COVID-19 (Coronavirus) information  Nahum does not work or volunteer as healthcare worker or a  and does not work or volunteer in a healthcare facility.   He does not live with a healthcare worker.   Nahum has not had a close contact with a laboratory-confirmed COVID-19 patient within 14 days of symptom onset. He also has not had a close contact with a suspected COVID-19 patient within 14 days of symptom onset.   Pertinent medical history  Nahum does not need a return to work/school note.   Weight: 190 lbs   Nahum does not smoke or use smokeless tobacco.   Additional information as reported by the patient (free " text): While breathing deep or feels like someone just punched me in the back, but just on one side.   Weight: 190 lbs    MEDICATIONS: omeprazole-sodium bicarbonate oral, ALLERGIES: Allegra  Clinician Response:  Dear Nahum,   Dear Nahum  Your symptoms show that you may have coronavirus (COVID-19). This illness can cause fever, cough and trouble breathing. Many people get a mild case and get better on their own. Some people can get very sick.  Will I be tested for COVID-19?  Because we still have limited testing supplies we are not testing everyone if they are low risk. We are testing if:   You are very ill. For example, you're on chemotherapy, dialysis or home hospice care. (Contact your specialty clinic or program.)   You live in a nursing home or other long-term care facility. (Talk to your nurse manager or medical director.)   You're a health care worker. ([facility] employees Contact our employee health office for testing.)   We are performing limited curbside testing for healthcare/first responders and people with medical problems that put them at increased risk. It does not appear by the [facility] information you submitted that you meet any of these criteria. If there are medical problems that we did not know about, please repeat an [facility] visit and let us know what medical conditions you have.   How can I protect others?  Without a test, we can't know for sure that you have COVID-19. For safety, it's very important to follow these rules.  First, stay home and away from others (self-isolate) until:   You've had no fever---and no medicine that reduces fever---for 3 full days (72 hours). And...    Your other symptoms have gotten better. For example, your cough or breathing has improved. And...   At least 10 days have passed since your symptoms started.   During this time:   Don't go to work, school or anywhere else.    Stay away from others in your home. No hugging, kissing or shaking hands.   Don't let  anyone visit.   Cover your mouth and nose with a mask, tissue or wash cloth to avoid spreading germs.   Wash your hands and face often. Use soap and water.   How can I take care of myself?  1.Take Tylenol (acetaminophen) for fever or pain. If you have liver or kidney problems, ask your family doctor if it's okay to take Tylenol.   Adults can take either:    650 mg (two 325 mg pills) every 4 to 6 hours, or...   1,000 mg (two 500 mg pills) every 8 hours as needed.    Note: Don't take more than 3,000 mg in one day.  For children, check the Tylenol bottle for the right dose. The dose is based on the child's age or weight.   2.If you have other health problems (like cancer, heart failure, an organ transplant or severe kidney disease): Call your specialty clinic if you don't feel better in the next 2 days.  3.Know when to call 911: If your breathing is so bad that it keeps you from doing normal activities, call 911 or go to the emergency room. Tell them that you've been staying home and may have COVID-19.  4.Sign up for [employer]. We know it's scary to hear that you might have COVID-19. We want to track your symptoms to make sure you're okay over the next 2 weeks. Please look for an email from [employer]---this is a free, online program that we'll use to keep in touch. To sign up, follow the link in the email. Learn more at [email or web address].  Where can I get more information?  To learn more about COVID-19 and how to care for yourself at home, please visit the CDC website at https://www.cdc.gov/coronavirus/2019-ncov/about/steps-when-sick.html.  You may be able to find more information about other facilities with testing capabilities at the [place] Department of health website.  For more options for care at [facility], please visit our website at [email or web address]   If you are interested in becoming part of a [facility] trial related to COVID19 please go to  [email or web address] for information, if you qualify.     Diagnosis: Myalgia  Diagnosis ICD: M79.1

## 2020-05-09 ENCOUNTER — MYC MEDICAL ADVICE (OUTPATIENT)
Dept: FAMILY MEDICINE | Facility: CLINIC | Age: 40
End: 2020-05-09

## 2020-05-28 ENCOUNTER — E-VISIT (OUTPATIENT)
Dept: FAMILY MEDICINE | Facility: CLINIC | Age: 40
End: 2020-05-28
Payer: COMMERCIAL

## 2020-05-28 DIAGNOSIS — M54.2 NECK PAIN: Primary | ICD-10-CM

## 2020-05-28 PROCEDURE — 99207 ZZC NO CHARGE LOS: CPT | Performed by: FAMILY MEDICINE

## 2020-05-29 ENCOUNTER — OFFICE VISIT (OUTPATIENT)
Dept: FAMILY MEDICINE | Facility: CLINIC | Age: 40
End: 2020-05-29
Payer: COMMERCIAL

## 2020-05-29 VITALS
RESPIRATION RATE: 14 BRPM | DIASTOLIC BLOOD PRESSURE: 78 MMHG | HEART RATE: 78 BPM | HEIGHT: 72 IN | WEIGHT: 191 LBS | TEMPERATURE: 98.7 F | BODY MASS INDEX: 25.87 KG/M2 | SYSTOLIC BLOOD PRESSURE: 120 MMHG

## 2020-05-29 DIAGNOSIS — M54.2 NECK PAIN: Primary | ICD-10-CM

## 2020-05-29 DIAGNOSIS — J06.9 UPPER RESPIRATORY TRACT INFECTION, UNSPECIFIED TYPE: ICD-10-CM

## 2020-05-29 PROCEDURE — 99213 OFFICE O/P EST LOW 20 MIN: CPT | Performed by: PHYSICIAN ASSISTANT

## 2020-05-29 RX ORDER — FEXOFENADINE HCL 180 MG/1
180 TABLET ORAL DAILY
COMMUNITY
End: 2024-08-21

## 2020-05-29 ASSESSMENT — ENCOUNTER SYMPTOMS
RESPIRATORY NEGATIVE: 1
CONSTITUTIONAL NEGATIVE: 1
CARDIOVASCULAR NEGATIVE: 1
GASTROINTESTINAL NEGATIVE: 1
EYES NEGATIVE: 1
NEUROLOGICAL NEGATIVE: 1
PSYCHIATRIC NEGATIVE: 1

## 2020-05-29 ASSESSMENT — MIFFLIN-ST. JEOR: SCORE: 1809.37

## 2020-05-29 NOTE — PROGRESS NOTES
Subjective     Nahum Chiu is a 39 year old male who presents to clinic today for the following health issues:    HPI   Neck Pain      Duration: 4 weeks    Description:  Location: lt side of neck tightness  Radiation: into the right neck    Intensity:  moderate    Accompanying signs and symptoms: neck soreness, feels strained, periodic [id number]    History (similar episodes/previous evaluation): pain came after dethaching yard    Precipitating or alleviating factors: None    Therapies tried and outcome: tylenol, stretching, massage    Left side of the neck pain with neck extension, gets the same on the right  When he does yard work, it gets worse  Intermittent neck stretches - 1 or 2 times a day  Initial improvement with the neck, now stable  Spurlings position - pain radiates to the right neck and shoulder blade  He had some numbness in the left arm, now better - 4th and 5th fingers, which lasted 6 hours  Denies weakness  Tylenol helps  He does not take ibuprofen or Aleve - GERD treated with omeprazole    He also had a URI in the past two months and would like a COVID-19 antibody test        Patient Active Problem List   Diagnosis     GERD (gastroesophageal reflux disease)     Shoulder pain, right     Pain in the coccyx     Constipation, unspecified constipation type     Past Surgical History:   Procedure Laterality Date     ESOPHAGOSCOPY, GASTROSCOPY, DUODENOSCOPY (EGD), COMBINED N/A 3/26/2020    Procedure: ESOPHAGOGASTRODUODENOSCOPY, WITH BIOPSY;  Surgeon: Kendell Riley MD;  Location:  [facility]         Social History     Tobacco Use     Smoking status: Former Smoker     Packs/day: 1.00     Years: 13.00     Pack years: 13.00     Types: Cigarettes, Cigars, Dip, chew, snus or snuff     Start date: 1996     Last attempt to quit: 2009     Years since quittin.3     Smokeless tobacco: Never Used   Substance Use Topics     Alcohol use: Yes     Comment: minimal use     Family History    Problem Relation Age of Onset     Diabetes No family hx of          Current Outpatient Medications   Medication Sig Dispense Refill     fexofenadine (ALLEGRA) 180 MG tablet Take 180 mg by mouth daily       omeprazole (PRILOSEC) 40 MG DR capsule TAKE 1 CAPSULE BY MOUTH EVERY DAY 90 capsule 2     cetirizine (ZYRTEC) 10 MG tablet Take 10 mg by mouth daily       No Known Allergies    Reviewed and updated as needed this visit by Provider         Review of Systems   Constitutional: Negative.    HENT: Negative.    Eyes: Negative.    Respiratory: Negative.    Cardiovascular: Negative.    Gastrointestinal: Negative.    Genitourinary: Negative.    Musculoskeletal:        As in HPI   Skin: Negative.    Neurological: Negative.    Psychiatric/Behavioral: Negative.          Objective    /78 (Cuff Size: Adult Regular)   Pulse 78   Temp 98.7  F (37.1  C) (Tympanic)   Resp 14   Ht 1.829 m (6')   Wt 86.6 kg (191 lb)   BMI 25.90 kg/m    Physical Exam  Constitutional:       General: He is not in acute distress.     Appearance: He is well-developed.   HENT:      Head: Normocephalic and atraumatic.      Nose: Nose normal.   Eyes:      Conjunctiva/sclera: Conjunctivae normal.   Neck:      Musculoskeletal: Normal range of motion.   Pulmonary:      Effort: Pulmonary effort is normal.   Musculoskeletal:      Cervical back: He exhibits pain (positive Spurling's left - mild) and spasm (left upper trap and rhomboids). He exhibits normal range of motion and no bony tenderness.   Skin:     General: Skin is warm and dry.   Neurological:      Mental Status: He is alert and oriented to person, place, and time.   Psychiatric:         Judgment: Judgment normal.           Diagnostic Test Results:  No results found for this or any previous visit (from the past 24 hour(s)).        Assessment & Plan   Problem List Items Addressed This Visit     None      Visit Diagnoses     Neck pain    -  Primary    Upper respiratory tract infection,  unspecified type        Relevant Orders    COVID-19 Virus (Coronavirus) Antibody         We discussed treatment for neck pain inlcuding stretches, PT, ice or heat, OTC medications for pain as needed.  No evidence of weakness or numbness - no indication for MRI at this time.  If the pain continues or gets worse, consider MRI imaging in the future.      BMI:   Estimated body mass index is 25.9 kg/m  as calculated from the following:    Height as of this encounter: 1.829 m (6').    Weight as of this encounter: 86.6 kg (191 lb).           There are no Patient Instructions on file for this visit.    Return in about 4 weeks (around 6/26/2020) for a recheck if you are not improved.    Aggie Talavera PA-C  [facility]

## 2020-06-01 DIAGNOSIS — J06.9 UPPER RESPIRATORY TRACT INFECTION, UNSPECIFIED TYPE: ICD-10-CM

## 2020-06-02 LAB
COVID-19 SPIKE RBD ABY TITER: NORMAL
COVID-19 SPIKE RBD ABY: NORMAL

## 2020-06-03 NOTE — RESULT ENCOUNTER NOTE
Nahum    You have an EQUIVOCAL COVID-19 serology antibody test that suggests that we cannot determine if you have been exposed to COVID-19.  Essentially this means it is not positive, and is not negative.     What is antibody testing?  This is a kind of blood test. We take a small sample of your blood, and then test it for something called  antibodies.    Your body makes antibodies to fight infection. If your blood has antibodies for a certain germ, it means you've been infected with that germ in the past.   Sometimes, antibodies stay in your body for years after you've had the infection. They can be there even if the germ didn't make you sick. They are a sign that your body fought off the infection.    Will this test find antibodies in everyone who's had COVID-19?  No. The test finds antibodies in most people 10 days after they get sick. For some people, it takes longer than 10 days for antibodies to show up. Others may never show antibodies against COVID-19, especially if they have weak immune systems.    What does it mean if my test finds COVID-19 antibodies?  If we find these antibodies, it suggests:     You have had the virus.     Your body's immune system fought the virus.   We don't know if this will help protect you from getting COVID-19 again. Scientists are still learning about this.    If you currently have COVID-19 symptoms, please isolate at home.    COVID-19 symptoms information:   People with COVID-19 have had a wide range of symptoms reported   ranging from mild symptoms to severe illness.  Symptoms may appear 2-14 days after exposure to the virus. People with these symptoms or combinations of symptoms may have COVID-19:  Cough  Shortness of breath or difficulty breathing  Or at least two of these symptoms:  Fever  Chills  Repeated shaking with chills  Muscle pain  Headache  Sore throat  New loss of taste or smell  This list is not all inclusive. Please consult your medical provider for any other  symptoms that are severe or concerning to you.  Where can I get more information?   To learn the [place]'s guidelines for staying home, please visit the [facility] website at [email or web address]  To learn more about COVID-19 and how to care for yourself at home, please visit the CDC website at https://www.cdc.gov/coronavirus/2019-ncov/about/steps-when-sick.html  For more options for care at [facility], please visit our website at [email or web address]  [facility] ([facility]) COVID-19 Hotline:  [phone]    Sincerely,  Woodrow Talavera PA-C

## 2020-06-04 NOTE — RESULT ENCOUNTER NOTE
Serology (COVID-19) Notification    Your test for COVID-19 antibodies came back with an EQUIVOCAL result.   This means the test could not tell if you've been exposed to COVID-19. Ask your doctor or clinic if they think you should have more tests  Letter sent to Patient

## 2020-08-01 DIAGNOSIS — K21.9 GASTROESOPHAGEAL REFLUX DISEASE WITHOUT ESOPHAGITIS: ICD-10-CM

## 2020-08-03 RX ORDER — OMEPRAZOLE 40 MG/1
CAPSULE, DELAYED RELEASE ORAL
Qty: 90 CAPSULE | Refills: 2 | Status: SHIPPED | OUTPATIENT
Start: 2020-08-03 | End: 2021-06-11

## 2020-08-17 ENCOUNTER — OFFICE VISIT (OUTPATIENT)
Dept: FAMILY MEDICINE | Facility: CLINIC | Age: 40
End: 2020-08-17
Payer: COMMERCIAL

## 2020-08-17 VITALS
RESPIRATION RATE: 16 BRPM | OXYGEN SATURATION: 97 % | DIASTOLIC BLOOD PRESSURE: 70 MMHG | HEART RATE: 66 BPM | BODY MASS INDEX: 26.99 KG/M2 | SYSTOLIC BLOOD PRESSURE: 102 MMHG | TEMPERATURE: 97.2 F | WEIGHT: 199 LBS

## 2020-08-17 DIAGNOSIS — T78.40XA ALLERGIC REACTION, INITIAL ENCOUNTER: ICD-10-CM

## 2020-08-17 DIAGNOSIS — Z11.4 ENCOUNTER FOR SCREENING FOR HIV: Primary | ICD-10-CM

## 2020-08-17 PROCEDURE — 80053 COMPREHEN METABOLIC PANEL: CPT | Performed by: INTERNAL MEDICINE

## 2020-08-17 PROCEDURE — 99213 OFFICE O/P EST LOW 20 MIN: CPT | Performed by: INTERNAL MEDICINE

## 2020-08-17 PROCEDURE — 36415 COLL VENOUS BLD VENIPUNCTURE: CPT | Performed by: INTERNAL MEDICINE

## 2020-08-17 PROCEDURE — 85025 COMPLETE CBC W/AUTO DIFF WBC: CPT | Performed by: INTERNAL MEDICINE

## 2020-08-17 PROCEDURE — 87389 HIV-1 AG W/HIV-1&-2 AB AG IA: CPT | Performed by: INTERNAL MEDICINE

## 2020-08-17 RX ORDER — FAMOTIDINE 20 MG/1
20 TABLET, FILM COATED ORAL DAILY
Qty: 30 TABLET | Refills: 1 | Status: SHIPPED | OUTPATIENT
Start: 2020-08-17 | End: 2020-09-09

## 2020-08-17 RX ORDER — METHYLPREDNISOLONE 4 MG
TABLET, DOSE PACK ORAL
Qty: 21 TABLET | Refills: 0 | Status: SHIPPED | OUTPATIENT
Start: 2020-08-17 | End: 2021-07-21

## 2020-08-17 RX ORDER — HYDROXYZINE HYDROCHLORIDE 25 MG/1
25 TABLET, FILM COATED ORAL 3 TIMES DAILY PRN
Qty: 30 TABLET | Refills: 1 | Status: SHIPPED | OUTPATIENT
Start: 2020-08-17 | End: 2021-07-21

## 2020-08-17 NOTE — PROGRESS NOTES
Subjective     Nahum Chiu is a 40 year old male who presents to clinic today for the following health issues:    HPI       Rash      Duration: 2 days    Description  Location: torso, arms and legs  Itching: moderate    Intensity:  moderate    Accompanying signs and symptoms: large red raised areas    History (similar episodes/previous evaluation): None    Precipitating or alleviating factors:  New exposures:  None  Recent travel: no      Therapies tried and outcome: Benadryl/diphenhydramine -  usually effective      Allergic reaction, initial encounter  This is a new problem which patient has been facing for last 3 days which he suddenly noticed in the night with whole of his trunk and extremities filled with reddish rash and associated itching. Denies any new medication except a multivitamin he started and a pizza from outside. Denies any SOB . Has been taking OTC benadryl Q 6hrly which helped to some extent.       Patient Active Problem List   Diagnosis     GERD (gastroesophageal reflux disease)     Allergic reaction, initial encounter     Past Surgical History:   Procedure Laterality Date     ESOPHAGOSCOPY, GASTROSCOPY, DUODENOSCOPY (EGD), COMBINED N/A 3/26/2020    Procedure: ESOPHAGOGASTRODUODENOSCOPY, WITH BIOPSY;  Surgeon: Kendell Riley MD;  Location:  GI     [facility]         Social History     Tobacco Use     Smoking status: Former Smoker     Packs/day: 1.00     Years: 13.00     Pack years: 13.00     Types: Cigarettes, Cigars, Dip, chew, snus or snuff     Start date: 1996     Last attempt to quit: 2009     Years since quittin.5     Smokeless tobacco: Never Used   Substance Use Topics     Alcohol use: Yes     Comment: minimal use     Family History   Problem Relation Age of Onset     Lupus Mother      No Known Problems Father      Diabetes No family hx of            Reviewed and updated as needed this visit by Provider  Tobacco  Allergies  Meds  Problems  Med Hx  Surg Hx  Fam  Hx         Review of Systems   Constitutional, HEENT, cardiovascular, pulmonary, GI, , musculoskeletal, neuro, skin, endocrine and psych systems are negative, except as otherwise noted.      Objective    /70   Pulse 66   Temp 97.2  F (36.2  C) (Tympanic)   Resp 16   Wt 90.3 kg (199 lb)   SpO2 97%   BMI 26.99 kg/m    Body mass index is 26.99 kg/m .  Physical Exam   GENERAL: healthy, alert and no distress  NECK: no adenopathy, no asymmetry, masses, or scars and thyroid normal to palpation  RESP: lungs clear to auscultation - no rales, rhonchi or wheezes  CV: regular rate and rhythm, normal S1 S2, no S3 or S4, no murmur, click or rub, no peripheral edema and peripheral pulses strong  ABDOMEN: soft, nontender, no hepatosplenomegaly, no masses and bowel sounds normal  MS: no gross musculoskeletal defects noted, no edema    Diagnostic Test Results:  Labs reviewed in Epic        Assessment and Plan  1. Allergic reaction, initial encounter  New problem, Urticarial rash generalized as per my review of the pictures on his cell phone though he still has ongoing itching with much improved rash at this time. Will do the required labs, referral to allergy specialist to treat the cause and given Medrol dosepak and Hydroxyzine for symptoms.  - CBC with platelets differential  - Comprehensive metabolic panel  - ALLERGY/ASTHMA ADULT REFERRAL  - hydrOXYzine (ATARAX) 25 MG tablet; Take 1 tablet (25 mg) by mouth 3 times daily as needed for itching or anxiety  Dispense: 30 tablet; Refill: 1  - methylPREDNISolone (MEDROL DOSEPAK) 4 MG tablet therapy pack; Follow Package Directions  Dispense: 21 tablet; Refill: 0  - famotidine (PEPCID) 20 MG tablet; Take 1 tablet (20 mg) by mouth daily  Dispense: 30 tablet; Refill: 1    2. Encounter for screening for HIV  - HIV Antigen Antibody Combo     Patient Instructions   Medications sent to your pharmacy.     Please do the lab work given.     Allergy specialist referral placed.      ======================    Patient Education     General Allergic Reactions  An allergic reaction is a set of symptoms caused by an allergen. An allergen is something that causes a person s immune system to react. When a person comes in contact with an allergen, it causes the body to release chemicals. These include the chemical histamine. Histamine causes swelling and itching. It may affect the entire body. This is called a general allergic reaction. Often symptoms affect only 1 part of the body. This is called a local allergic reaction.  You are having an allergic reaction. Almost anything can cause one. Different people are allergic to different things. It is usually something that you ate or swallowed, came into contact with by getting or putting it on your skin or clothes, or something you breathed in the air. This can be very annoying and sometimes scary.  Most of us think of allergic reactions when we have a rash or itchy skin. Symptoms can include:    Itching of the eyes, nose, and roof of the mouth    Runny or stuffy nose    Watery eyes     Sneezing or coughing     A blocked feeling in the ear    Red, itchy rash called hives    Red and purple spots    Rash, redness, welts, blisters    Itching, burning, stinging, pain    Dry, flaky, cracking, scaly skin  Severe symptoms include:    Swelling of the face, lips, or other parts of the body    Hoarse voice    Trouble swallowing, feeling like your throat is closing    Trouble breathing, wheezing    Nausea, vomiting, diarrhea, stomach cramps    Feeling faint or lightheaded, rapid heart rate  Sometimes the cause may be obvious. But there are so many things that can cause a reaction that you may not be able to figure out. The most important things to help find your allergen are:    Remembering when it started    What you were doing at the time or just before that    Any activities you were involved in    Any new products or contacts  Below are some common causes.  But remember that almost anything can cause a reaction. You may not even be aware that you came into contact with one of these things:    Dust, mold, pollen    Plants (common ones are poison ivy and poison oak, but there are many others)     Animals    Foods such as shrimp, shellfish, peanuts, milk products, gluten, and eggs. Also food colorings, flavorings, and additives.    Insect bites or stings such as bees, mosquitos, fleas, ticks    Medicines such as penicillin, sulfa medicines, amoxicillin, aspirin, and ibuprofen. But any medicine can cause a reaction.    Jewelry such as nickel or gold. This can be new, or something you ve worn for a while, including zippers and buttons.    Latex such as in gloves, clothes, toys, balloons, or some tapes. Some people allergic to latex may also have problems with foods like bananas, avocados, kiwi, papaya, or chestnuts.    Lotions, perfumes, cosmetics, soaps, shampoos, skincare products, nail products    Chemicals or dyes in clothing, linen, , hair dyes, soaps, iodine  Many viruses and common colds can cause a rash that is not an allergic reaction. Sometimes it is hard to tell the difference between allergies, sensitivity, or an intolerance to something. This is especially true with food. Many things can cause diarrhea, vomiting, stomach cramps, and skin irritation.  Home care    The goal of treatment is to help relieve the symptoms and get you feeling better. The rash will usually fade over several days. But it can sometimes last a couple of weeks. Over the next couple of days, there may be times when it is gets a little worse, and then better again. Here are some things to do:    If you know what you are allergic to, stay away from it. Future reactions could be worse than this one.    Avoid tight clothing and anything that heats up your skin (hot showers or baths, direct sunlight). Heat will make itching worse.    An ice pack will relieve local areas of intense  itching and redness. To make an ice pack, put ice cubes in a plastic bag that seals at the top. Wrap it in a thin, clean towel. Don t put the ice directly on the skin because it can damage the skin.    Oral diphenhydramine is an over-the-counter antihistamine sold at pharmacy and grocery stores. Unless a prescription antihistamine was given, diphenhydramine may be used to reduce itching if large areas of the skin are involved. It may make you sleepy. So be careful using it in the daytime or when going to school, working, or driving. Note: Don t use diphenhydramine if you have glaucoma or if you are a man with trouble urinating due to an enlarged prostate. There are other antihistamines that won t make you so sleepy. These are good choices for daytime use. Ask your pharmacist for suggestions.    Don t use diphenhydramine cream on your skin. It can cause a further reaction in some people.    To help prevent an infection, don't scratch the affected area. Scratching may worsen the reaction and damage your skin. It can also lead to an infection. Always check the affected for signs of an infection.    Call your healthcare provider and ask what you can use to help decrease the itching.    To decrease allergic reactions, try the following:      Use heat-steam to clean your home    Use high-efficiency particulate (HEPA) vacuums and filters    Stay away from food and pet triggers    Kill any cockroaches    Clean your house often  Follow-up care  Follow up with your healthcare provider, or as advised. If you had a severe reaction today, or if you have had several mild to medium allergic reactions in the past, ask your provider about allergy testing. This can help you find out what you are allergic to. If your reaction included dizziness, fainting, or trouble breathing or swallowing, ask your provider about carrying auto-injectable epinephrine.  Call 911  Call 911 if any of these occur:    Trouble breathing or swallowing,  wheezing    Cool, moist, pale skin    Shortness of breath    Hoarse voice or trouble speaking    Confused     Very drowsy or trouble awakening    Fainting or loss of consciousness    Rapid heart rate    Feeling of dizziness or weakness or a sudden drop in blood pressure    Feeling of doom    Feeling lightheaded    Severe nausea or vomiting, or diarrhea    Seizure    Swelling in the face, eyelids, lips, mouth, throat or tongue    Drooling  When to seek medical advice  Call your healthcare provider right away if any of these occur:    Spreading areas of itching, redness or swelling    Nausea or stomach cramps or abdominal pain    Continuing or recurring symptoms    Spreading areas of redness, swelling, or itching    Signs of infection at the affected site:  ? Spreading redness  ? Increased pain or swelling  ? Fluid or colored drainage from the site  ? Fever of 100.4 F (38 C) or above lasting for 24 to 48 hours, or as directed by your provider  Date Last Reviewed: 3/1/2017    5053-7662 [employer]. [address] [place] [address]. All rights reserved. This information is not intended as a substitute for professional medical care. Always follow your healthcare professional's instructions.             Return in about 1 month (around 9/17/2020), or if symptoms worsen or fail to improve, for If symptoms persist, Preventative Visit.    Lidia Quintero MD  [facility]

## 2020-08-17 NOTE — ASSESSMENT & PLAN NOTE
This is a new problem which patient has been facing for last 3 days which he suddenly noticed in the night with whole of his trunk and extremities filled with reddish rash and associated itching. Denies any new medication except a multivitamin he started and a pizza from outside. Denies any SOB . Has been taking OTC benadryl Q 6hrly which helped to some extent.

## 2020-08-17 NOTE — PATIENT INSTRUCTIONS
Medications sent to your pharmacy.     Please do the lab work given.     Allergy specialist referral placed.     ======================    Patient Education     General Allergic Reactions  An allergic reaction is a set of symptoms caused by an allergen. An allergen is something that causes a person s immune system to react. When a person comes in contact with an allergen, it causes the body to release chemicals. These include the chemical histamine. Histamine causes swelling and itching. It may affect the entire body. This is called a general allergic reaction. Often symptoms affect only 1 part of the body. This is called a local allergic reaction.  You are having an allergic reaction. Almost anything can cause one. Different people are allergic to different things. It is usually something that you ate or swallowed, came into contact with by getting or putting it on your skin or clothes, or something you breathed in the air. This can be very annoying and sometimes scary.  Most of us think of allergic reactions when we have a rash or itchy skin. Symptoms can include:    Itching of the eyes, nose, and roof of the mouth    Runny or stuffy nose    Watery eyes     Sneezing or coughing     A blocked feeling in the ear    Red, itchy rash called hives    Red and purple spots    Rash, redness, welts, blisters    Itching, burning, stinging, pain    Dry, flaky, cracking, scaly skin  Severe symptoms include:    Swelling of the face, lips, or other parts of the body    Hoarse voice    Trouble swallowing, feeling like your throat is closing    Trouble breathing, wheezing    Nausea, vomiting, diarrhea, stomach cramps    Feeling faint or lightheaded, rapid heart rate  Sometimes the cause may be obvious. But there are so many things that can cause a reaction that you may not be able to figure out. The most important things to help find your allergen are:    Remembering when it started    What you were doing at the time or just  before that    Any activities you were involved in    Any new products or contacts  Below are some common causes. But remember that almost anything can cause a reaction. You may not even be aware that you came into contact with one of these things:    Dust, mold, pollen    Plants (common ones are poison ivy and poison oak, but there are many others)     Animals    Foods such as shrimp, shellfish, peanuts, milk products, gluten, and eggs. Also food colorings, flavorings, and additives.    Insect bites or stings such as bees, mosquitos, fleas, ticks    Medicines such as penicillin, sulfa medicines, amoxicillin, aspirin, and ibuprofen. But any medicine can cause a reaction.    Jewelry such as nickel or gold. This can be new, or something you ve worn for a while, including zippers and buttons.    Latex such as in gloves, clothes, toys, balloons, or some tapes. Some people allergic to latex may also have problems with foods like bananas, avocados, kiwi, papaya, or chestnuts.    Lotions, perfumes, cosmetics, soaps, shampoos, skincare products, nail products    Chemicals or dyes in clothing, linen, , hair dyes, soaps, iodine  Many viruses and common colds can cause a rash that is not an allergic reaction. Sometimes it is hard to tell the difference between allergies, sensitivity, or an intolerance to something. This is especially true with food. Many things can cause diarrhea, vomiting, stomach cramps, and skin irritation.  Home care    The goal of treatment is to help relieve the symptoms and get you feeling better. The rash will usually fade over several days. But it can sometimes last a couple of weeks. Over the next couple of days, there may be times when it is gets a little worse, and then better again. Here are some things to do:    If you know what you are allergic to, stay away from it. Future reactions could be worse than this one.    Avoid tight clothing and anything that heats up your skin (hot showers  or baths, direct sunlight). Heat will make itching worse.    An ice pack will relieve local areas of intense itching and redness. To make an ice pack, put ice cubes in a plastic bag that seals at the top. Wrap it in a thin, clean towel. Don t put the ice directly on the skin because it can damage the skin.    Oral diphenhydramine is an over-the-counter antihistamine sold at pharmacy and grocery stores. Unless a prescription antihistamine was given, diphenhydramine may be used to reduce itching if large areas of the skin are involved. It may make you sleepy. So be careful using it in the daytime or when going to school, working, or driving. Note: Don t use diphenhydramine if you have glaucoma or if you are a man with trouble urinating due to an enlarged prostate. There are other antihistamines that won t make you so sleepy. These are good choices for daytime use. Ask your pharmacist for suggestions.    Don t use diphenhydramine cream on your skin. It can cause a further reaction in some people.    To help prevent an infection, don't scratch the affected area. Scratching may worsen the reaction and damage your skin. It can also lead to an infection. Always check the affected for signs of an infection.    Call your healthcare provider and ask what you can use to help decrease the itching.    To decrease allergic reactions, try the following:      Use heat-steam to clean your home    Use high-efficiency particulate (HEPA) vacuums and filters    Stay away from food and pet triggers    Kill any cockroaches    Clean your house often  Follow-up care  Follow up with your healthcare provider, or as advised. If you had a severe reaction today, or if you have had several mild to medium allergic reactions in the past, ask your provider about allergy testing. This can help you find out what you are allergic to. If your reaction included dizziness, fainting, or trouble breathing or swallowing, ask your provider about carrying  auto-injectable epinephrine.  Call 911  Call 911 if any of these occur:    Trouble breathing or swallowing, wheezing    Cool, moist, pale skin    Shortness of breath    Hoarse voice or trouble speaking    Confused     Very drowsy or trouble awakening    Fainting or loss of consciousness    Rapid heart rate    Feeling of dizziness or weakness or a sudden drop in blood pressure    Feeling of doom    Feeling lightheaded    Severe nausea or vomiting, or diarrhea    Seizure    Swelling in the face, eyelids, lips, mouth, throat or tongue    Drooling  When to seek medical advice  Call your healthcare provider right away if any of these occur:    Spreading areas of itching, redness or swelling    Nausea or stomach cramps or abdominal pain    Continuing or recurring symptoms    Spreading areas of redness, swelling, or itching    Signs of infection at the affected site:  ? Spreading redness  ? Increased pain or swelling  ? Fluid or colored drainage from the site  ? Fever of 100.4 F (38 C) or above lasting for 24 to 48 hours, or as directed by your provider  Date Last Reviewed: 3/1/2017    [id number] [employer]. [address] [place] [address]. All rights reserved. This information is not intended as a substitute for professional medical care. Always follow your healthcare professional's instructions.

## 2020-08-18 LAB
ALBUMIN SERPL-MCNC: 3.8 G/DL (ref 3.4–5)
ALP SERPL-CCNC: 54 U/L (ref 40–150)
ALT SERPL W P-5'-P-CCNC: 25 U/L (ref 0–70)
ANION GAP SERPL CALCULATED.3IONS-SCNC: 9 MMOL/L (ref 3–14)
AST SERPL W P-5'-P-CCNC: 15 U/L (ref 0–45)
BASOPHILS # BLD AUTO: 0 10E9/L (ref 0–0.2)
BASOPHILS NFR BLD AUTO: 0.5 %
BILIRUB SERPL-MCNC: 0.5 MG/DL (ref 0.2–1.3)
BUN SERPL-MCNC: 12 MG/DL (ref 7–30)
CALCIUM SERPL-MCNC: 8.8 MG/DL (ref 8.5–10.1)
CHLORIDE SERPL-SCNC: 105 MMOL/L (ref 94–109)
CO2 SERPL-SCNC: 23 MMOL/L (ref 20–32)
CREAT SERPL-MCNC: 0.97 MG/DL (ref 0.66–1.25)
DIFFERENTIAL METHOD BLD: NORMAL
EOSINOPHIL # BLD AUTO: 0.2 10E9/L (ref 0–0.7)
EOSINOPHIL NFR BLD AUTO: 2.5 %
ERYTHROCYTE [DISTWIDTH] IN BLOOD BY AUTOMATED COUNT: 12.7 % (ref 10–15)
GFR SERPL CREATININE-BSD FRML MDRD: >90 ML/MIN/{1.73_M2}
GLUCOSE SERPL-MCNC: 93 MG/DL (ref 70–99)
HCT VFR BLD AUTO: 47.8 % (ref 40–53)
HGB BLD-MCNC: 16.1 G/DL (ref 13.3–17.7)
LYMPHOCYTES # BLD AUTO: 2.1 10E9/L (ref 0.8–5.3)
LYMPHOCYTES NFR BLD AUTO: 32.2 %
MCH RBC QN AUTO: 31.1 PG (ref 26.5–33)
MCHC RBC AUTO-ENTMCNC: 33.7 G/DL (ref 31.5–36.5)
MCV RBC AUTO: 93 FL (ref 78–100)
MONOCYTES # BLD AUTO: 0.3 10E9/L (ref 0–1.3)
MONOCYTES NFR BLD AUTO: 4.4 %
NEUTROPHILS # BLD AUTO: 3.9 10E9/L (ref 1.6–8.3)
NEUTROPHILS NFR BLD AUTO: 60.4 %
PLATELET # BLD AUTO: 211 10E9/L (ref 150–450)
POTASSIUM SERPL-SCNC: 4.2 MMOL/L (ref 3.4–5.3)
PROT SERPL-MCNC: 7.4 G/DL (ref 6.8–8.8)
RBC # BLD AUTO: 5.17 10E12/L (ref 4.4–5.9)
SODIUM SERPL-SCNC: 136 MMOL/L (ref 133–144)
WBC # BLD AUTO: 6.4 10E9/L (ref 4–11)

## 2020-08-19 LAB — HIV 1+2 AB+HIV1 P24 AG SERPL QL IA: NONREACTIVE

## 2020-09-09 DIAGNOSIS — T78.40XA ALLERGIC REACTION, INITIAL ENCOUNTER: ICD-10-CM

## 2020-09-09 RX ORDER — FAMOTIDINE 20 MG/1
20 TABLET, FILM COATED ORAL DAILY
Qty: 30 TABLET | Refills: 10 | Status: SHIPPED | OUTPATIENT
Start: 2020-09-09 | End: 2021-12-28

## 2020-12-20 ENCOUNTER — HEALTH MAINTENANCE LETTER (OUTPATIENT)
Age: 40
End: 2020-12-20

## 2021-04-24 ENCOUNTER — HEALTH MAINTENANCE LETTER (OUTPATIENT)
Age: 41
End: 2021-04-24

## 2021-05-13 ENCOUNTER — ANCILLARY PROCEDURE (OUTPATIENT)
Dept: GENERAL RADIOLOGY | Facility: CLINIC | Age: 41
End: 2021-05-13
Attending: FAMILY MEDICINE
Payer: COMMERCIAL

## 2021-05-13 ENCOUNTER — OFFICE VISIT (OUTPATIENT)
Dept: URGENT CARE | Facility: URGENT CARE | Age: 41
End: 2021-05-13
Payer: COMMERCIAL

## 2021-05-13 VITALS
RESPIRATION RATE: 16 BRPM | DIASTOLIC BLOOD PRESSURE: 80 MMHG | WEIGHT: 203 LBS | SYSTOLIC BLOOD PRESSURE: 120 MMHG | HEART RATE: 62 BPM | OXYGEN SATURATION: 98 % | TEMPERATURE: 98.5 F | BODY MASS INDEX: 27.53 KG/M2

## 2021-05-13 DIAGNOSIS — S69.91XA INJURY OF FINGER OF RIGHT HAND, INITIAL ENCOUNTER: ICD-10-CM

## 2021-05-13 DIAGNOSIS — S69.91XA INJURY OF FINGER OF RIGHT HAND, INITIAL ENCOUNTER: Primary | ICD-10-CM

## 2021-05-13 PROCEDURE — 99213 OFFICE O/P EST LOW 20 MIN: CPT | Performed by: FAMILY MEDICINE

## 2021-05-13 PROCEDURE — 73140 X-RAY EXAM OF FINGER(S): CPT | Mod: TC | Performed by: RADIOLOGY

## 2021-05-14 NOTE — PATIENT INSTRUCTIONS
Patient Education     Mallet Finger  You have an injury to your finger causing the tip of your finger to droop down. This makes your finger look like a small hammer or mallet. This is why it s given this name. It is also called baseball finger. This injury happens when the tendon that holds the finger straight at the last joint tears. Sometimes a small break happens where this tendon attaches to the bone. This causes local pain, swelling, and bruising. This injury usually takes about 4 to 6 weeks to heal. This injury is often treated with a special finger splint called a stack splint. It holds the tendon in the correct position. But even with right treatment, it may not be possible to fully straighten that joint after the injury heals. After healing, the joint may be stiff but usually recovers flexibility over time.  Home care    Keep your arm elevated to reduce pain and swelling. When sitting or lying down, elevate your arm above the level of your heart. You can do this by placing your arm on a pillow that rests on your chest or on a pillow at your side. This is most important during the first 48 hours after the injury.    Put an ice pack over the injured area for 15 to 20 minutes every 3 to 6 hours. You should do this for the first 24 to 48 hours. You can make an ice pack by filling a plastic bag that seals at the top with ice cubes and then wrapping it with a thin towel. Be careful not to injure your skin with the ice treatments. Ice should never be applied directly to skin. Continue the use of ice packs for relief of pain and swelling as needed. After 48 hours, apply heat (warm shower or warm bath) for 15 to 20 minutes several times a day, or alternate ice and heat.    If a splint was applied, keep it in place for the time advised. If you remove it too soon, it will cause the position of the tendon to change and the finger joint will heal in a bent position.    You may use over-the-counter pain medicine to  control pain, unless another pain medicine was prescribed. Talk with your healthcare provider before using these medicines if you have chronic liver or kidney disease or ever had a stomach ulcer or gastrointestinal bleeding.    Most people can return to normal activity while wearing the splint. Discuss any limitations with your healthcare provider.  Follow-up care  Follow up with your healthcare provider, or as advised.  If X-rays were taken, you will be told of any new findings that may affect your care.   When to seek medical advice  Call your healthcare provider right away if any of the following occur:    Pain or swelling in the injured finger increases    The finger becomes red or warm     Injured finger becomes cold, blue, numb, or tingly  Mary last reviewed this educational content on 5/1/2018 2000-2021 [employer]. All rights reserved. This information is not intended as a substitute for professional medical care. Always follow your healthcare professional's instructions.

## 2021-05-14 NOTE — PROGRESS NOTES
ICD-10-CM    1. Injury of finger of right hand, initial encounter  S69.91XA XR Finger Right G/E 2 Views     [facility]  Referral    discussed stack splint all the time for 6 weeks. follow up with sports medicine.     SUBJECTIVE:  Nahum Chiu is a 40 year old male who sustained a right middle finger injury a number of  hours ago. Mechanism of injury: axial bump on pan he was cleaning. Immediate symptoms: no pain but noted deformity right away. . Symptoms have been unchanged since that time. Prior history of related problems: no prior problems with this area in the past.    Given a stack splint by a provider at work.     OBJECTIVE:  Vital signs as noted above.  Appearance: in no apparent distress.  Hand exam: reduced range of motion of extension at the dip joint. Unable to extend fully. .  X-ray: no fracture or dislocation noted.

## 2021-05-26 ENCOUNTER — OFFICE VISIT (OUTPATIENT)
Dept: ORTHOPEDICS | Facility: CLINIC | Age: 41
End: 2021-05-26
Attending: FAMILY MEDICINE
Payer: COMMERCIAL

## 2021-05-26 VITALS
DIASTOLIC BLOOD PRESSURE: 72 MMHG | SYSTOLIC BLOOD PRESSURE: 104 MMHG | HEIGHT: 72 IN | BODY MASS INDEX: 27.5 KG/M2 | WEIGHT: 203 LBS

## 2021-05-26 DIAGNOSIS — S69.91XA INJURY OF FINGER OF RIGHT HAND, INITIAL ENCOUNTER: ICD-10-CM

## 2021-05-26 DIAGNOSIS — M20.011 MALLET FINGER OF RIGHT HAND: Primary | ICD-10-CM

## 2021-05-26 PROCEDURE — 99203 OFFICE O/P NEW LOW 30 MIN: CPT | Performed by: ORTHOPAEDIC SURGERY

## 2021-05-26 ASSESSMENT — MIFFLIN-ST. JEOR: SCORE: 1858.8

## 2021-05-26 NOTE — PROGRESS NOTES
HISTORY OF PRESENT ILLNESS:    Nahum Chiu is a 40 year old male who is seen in Urgent Care follow up for mallet finger of right long finger.  Onset of symptoms 5/13/21, he is 13 days out from injury. Patient reports injury while scrubbing a pan. He reports that bumped his finger in the corner of the pan. He reports no pain, but obvious deformity.  Patient is right hand dominant, and working as a .   Present symptoms: Patient reports no pain of the finger. He reports obvious mallet deformity shown in photo. Patient was seen in  on 5/13 and provided staxx splint. He reports maintaining extended position.  No swelling or bruising.  Current pain level: 0/10   Treatments tried to this point: staxx splint  Orthopedic PMH: none     Past Medical History:   Diagnosis Date     Environmental allergies      GERD (gastroesophageal reflux disease)        Past Surgical History:   Procedure Laterality Date     ESOPHAGOSCOPY, GASTROSCOPY, DUODENOSCOPY (EGD), COMBINED N/A 3/26/2020    Procedure: ESOPHAGOGASTRODUODENOSCOPY, WITH BIOPSY;  Surgeon: Kendell Riley MD;  Location:  GI     HERNIA REPAIR  1995       Family History   Problem Relation Age of Onset     Lupus Mother      No Known Problems Father      Diabetes No family hx of        Social History     Socioeconomic History     Marital status:      Spouse name: Not on file     Number of children: Not on file     Years of education: Not on file     Highest education level: Not on file   Occupational History     Not on file   Social Needs     Financial resource strain: Not on file     Food insecurity     Worry: Not on file     Inability: Not on file     Transportation needs     Medical: Not on file     Non-medical: Not on file   Tobacco Use     Smoking status: Former Smoker     Packs/day: 1.00     Years: 13.00     Pack years: 13.00     Types: Cigarettes, Cigars, Dip, chew, snus or snuff     Start date: 9/1/1996     Quit date: 2/1/2009     Years since quitting:  12.3     Smokeless tobacco: Never Used   Substance and Sexual Activity     Alcohol use: Yes     Comment: minimal use     Drug use: No     Sexual activity: Yes     Partners: Female     Birth control/protection: Condom   Lifestyle     Physical activity     Days per week: Not on file     Minutes per session: Not on file     Stress: Not on file   Relationships     Social connections     Talks on phone: Not on file     Gets together: Not on file     Attends Spiritism service: Not on file     Active member of club or organization: Not on file     Attends meetings of clubs or organizations: Not on file     Relationship status: Not on file     Intimate partner violence     Fear of current or ex partner: Not on file     Emotionally abused: Not on file     Physically abused: Not on file     Forced sexual activity: Not on file   Other Topics Concern     Parent/sibling w/ CABG, MI or angioplasty before 65F 55M? No   Social History Narrative     Not on file       Current Outpatient Medications   Medication Sig Dispense Refill     famotidine (PEPCID) 20 MG tablet Take 1 tablet (20 mg) by mouth daily 30 tablet 10     fexofenadine (ALLEGRA) 180 MG tablet Take 180 mg by mouth daily       methylPREDNISolone (MEDROL DOSEPAK) 4 MG tablet therapy pack Follow Package Directions 21 tablet 0     omeprazole (PRILOSEC) 40 MG DR capsule TAKE 1 CAPSULE BY MOUTH EVERY DAY 90 capsule 2     hydrOXYzine (ATARAX) 25 MG tablet Take 1 tablet (25 mg) by mouth 3 times daily as needed for itching or anxiety 30 tablet 1       No Known Allergies    REVIEW OF SYSTEMS:  CONSTITUTIONAL:  NEGATIVE for fever, chills, change in weight  INTEGUMENTARY/SKIN:  NEGATIVE for worrisome rashes, moles or lesions  EYES:  NEGATIVE for vision changes or irritation  ENT/MOUTH:  NEGATIVE for ear, mouth and throat problems  RESP:  NEGATIVE for significant cough or SOB  BREAST:  NEGATIVE for masses, tenderness or discharge  CV:  NEGATIVE for chest pain, palpitations or  peripheral edema  GI:  reflux  :  Negative   MUSCULOSKELETAL:  See HPI above  NEURO:  NEGATIVE for weakness, dizziness or paresthesias  ENDOCRINE:  NEGATIVE for temperature intolerance, skin/hair changes  HEME/ALLERGY/IMMUNE:  NEGATIVE for bleeding problems  PSYCHIATRIC:  NEGATIVE for changes in mood or affect      PHYSICAL EXAM:  /72 (BP Location: Right arm, Patient Position: Chair, Cuff Size: Adult Regular)   Ht 1.829 m (6')   Wt 92.1 kg (203 lb)   BMI 27.53 kg/m    Body mass index is 27.53 kg/m .   GENERAL APPEARANCE: healthy, alert and no distress   HEENT: No apparent thyroid megaly. Clear sclera with normal ocular movement  RESPIRATORY: No labored breathing  SKIN: no suspicious lesions or rashes  NEURO: Normal strength and tone, mentation intact and speech normal  VASCULAR: Good pulses, and capillary refill   LYMPH: no lymphadenopathy   PSYCH:  mentation appears normal and affect normal/bright    MUSCULOSKELETAL:  Out of the stack splint, his long finger appears to be normal in size  Full extension is maintained at the DIP joint but we do not stress that joint  Circulation is intact  Skin is minimally macerated from being under the stack splint  No open sores noted  Sensation is intact grossly       ASSESSMENT:  Mallet finger, right long finger    PLAN:  Based on the report that he provided today, he has good understanding of the nature of the injury as well as how to manage the situation.  So far, he has done a good job of maintaining full extended position of the DIP joint without letting it bend.  He also has been doing cleaning of the skin once a day.  I suggested may be doing it twice a day to keep it drier.  It was emphasized that consistent maintenance of his extension needs to be continued for 6 weeks.  At that point if he sees return of droopy position of the DIP joint, he should extend the use of the brace for 2 more weeks.  Overall good prognosis for this injury was informed  If he feels  comfortable with that, he can be seen as needed.    We talked about the remote possibility of pinning of the DIP joint if he wants to go without the brace.  He feels comfortable using the brace for the treatment for now.      Imaging Interpretation:   None today but visualized x-rays of the long finger from May 13, 2021 when he went to the urgent care.      Cornelius Diaz MD  Department of Orthopedic Surgery        Disclaimer: This note consists of symbols derived from keyboarding, dictation and/or voice recognition software. As a result, there may be errors in the script that have gone undetected. Please consider this when interpreting information found in this chart.

## 2021-05-26 NOTE — PATIENT INSTRUCTIONS
Consistent maintenance of extended position at the DIP joint for 6 weeks  If any flexion posture is noted, extend use of the brace for 2 more weeks  Cleaning of the skin twice a day  If everything goes well, follow-up as needed

## 2021-05-26 NOTE — LETTER
5/26/2021         RE: Nahum Chiu  [id number] [facility] [address]        Dear Colleague,    Thank you for referring your patient, Nahum Chiu, to the [facility] [place]. Please see a copy of my visit note below.    HISTORY OF PRESENT ILLNESS:    Nahum Chiu is a 40 year old male who is seen in Urgent Care follow up for mallet finger of right long finger.  Onset of symptoms 5/13/21, he is 13 days out from injury. Patient reports injury while scrubbing a pan. He reports that bumped his finger in the corner of the pan. He reports no pain, but obvious deformity.  Patient is right hand dominant, and working as a .   Present symptoms: Patient reports no pain of the finger. He reports obvious mallet deformity shown in photo. Patient was seen in UC on 5/13 and provided staxx splint. He reports maintaining extended position.  No swelling or bruising.  Current pain level: 0/10   Treatments tried to this point: staxx splint  Orthopedic PMH: none     Past Medical History:   Diagnosis Date     Environmental allergies      GERD (gastroesophageal reflux disease)        Past Surgical History:   Procedure Laterality Date     ESOPHAGOSCOPY, GASTROSCOPY, DUODENOSCOPY (EGD), COMBINED N/A 3/26/2020    Procedure: ESOPHAGOGASTRODUODENOSCOPY, WITH BIOPSY;  Surgeon: Kendell Riley MD;  Location:  GI     HERNIA REPAIR  1995       Family History   Problem Relation Age of Onset     Lupus Mother      No Known Problems Father      Diabetes No family hx of        Social History     Socioeconomic History     Marital status:      Spouse name: Not on file     Number of children: Not on file     Years of education: Not on file     Highest education level: Not on file   Occupational History     Not on file   Social Needs     Financial resource strain: Not on file     Food insecurity     Worry: Not on file     Inability: Not on file     Transportation needs     Medical: Not on file      Non-medical: Not on file   Tobacco Use     Smoking status: Former Smoker     Packs/day: 1.00     Years: 13.00     Pack years: 13.00     Types: Cigarettes, Cigars, Dip, chew, snus or snuff     Start date: 1996     Quit date: 2009     Years since quittin.3     Smokeless tobacco: Never Used   Substance and Sexual Activity     Alcohol use: Yes     Comment: minimal use     Drug use: No     Sexual activity: Yes     Partners: Female     Birth control/protection: Condom   Lifestyle     Physical activity     Days per week: Not on file     Minutes per session: Not on file     Stress: Not on file   Relationships     Social connections     Talks on phone: Not on file     Gets together: Not on file     Attends Hinduism service: Not on file     Active member of club or organization: Not on file     Attends meetings of clubs or organizations: Not on file     Relationship status: Not on file     Intimate partner violence     Fear of current or ex partner: Not on file     Emotionally abused: Not on file     Physically abused: Not on file     Forced sexual activity: Not on file   Other Topics Concern     Parent/sibling w/ CABG, MI or angioplasty before 65F 55M? No   Social History Narrative     Not on file       Current Outpatient Medications   Medication Sig Dispense Refill     famotidine (PEPCID) 20 MG tablet Take 1 tablet (20 mg) by mouth daily 30 tablet 10     fexofenadine (ALLEGRA) 180 MG tablet Take 180 mg by mouth daily       methylPREDNISolone (MEDROL DOSEPAK) 4 MG tablet therapy pack Follow Package Directions 21 tablet 0     omeprazole (PRILOSEC) 40 MG DR capsule TAKE 1 CAPSULE BY MOUTH EVERY DAY 90 capsule 2     hydrOXYzine (ATARAX) 25 MG tablet Take 1 tablet (25 mg) by mouth 3 times daily as needed for itching or anxiety 30 tablet 1       No Known Allergies    REVIEW OF SYSTEMS:  CONSTITUTIONAL:  NEGATIVE for fever, chills, change in weight  INTEGUMENTARY/SKIN:  NEGATIVE for worrisome rashes, moles or  lesions  EYES:  NEGATIVE for vision changes or irritation  ENT/MOUTH:  NEGATIVE for ear, mouth and throat problems  RESP:  NEGATIVE for significant cough or SOB  BREAST:  NEGATIVE for masses, tenderness or discharge  CV:  NEGATIVE for chest pain, palpitations or peripheral edema  GI:  reflux  :  Negative   MUSCULOSKELETAL:  See HPI above  NEURO:  NEGATIVE for weakness, dizziness or paresthesias  ENDOCRINE:  NEGATIVE for temperature intolerance, skin/hair changes  HEME/ALLERGY/IMMUNE:  NEGATIVE for bleeding problems  PSYCHIATRIC:  NEGATIVE for changes in mood or affect      PHYSICAL EXAM:  /72 (BP Location: Right arm, Patient Position: Chair, Cuff Size: Adult Regular)   Ht 1.829 m (6')   Wt 92.1 kg (203 lb)   BMI 27.53 kg/m    Body mass index is 27.53 kg/m .   GENERAL APPEARANCE: healthy, alert and no distress   HEENT: No apparent thyroid megaly. Clear sclera with normal ocular movement  RESPIRATORY: No labored breathing  SKIN: no suspicious lesions or rashes  NEURO: Normal strength and tone, mentation intact and speech normal  VASCULAR: Good pulses, and capillary refill   LYMPH: no lymphadenopathy   PSYCH:  mentation appears normal and affect normal/bright    MUSCULOSKELETAL:  Out of the stack splint, his long finger appears to be normal in size  Full extension is maintained at the DIP joint but we do not stress that joint  Circulation is intact  Skin is minimally macerated from being under the stack splint  No open sores noted  Sensation is intact grossly       ASSESSMENT:  Mallet finger, right long finger    PLAN:  Based on the report that he provided today, he has good understanding of the nature of the injury as well as how to manage the situation.  So far, he has done a good job of maintaining full extended position of the DIP joint without letting it bend.  He also has been doing cleaning of the skin once a day.  I suggested may be doing it twice a day to keep it drier.  It was emphasized that  consistent maintenance of his extension needs to be continued for 6 weeks.  At that point if he sees return of droopy position of the DIP joint, he should extend the use of the brace for 2 more weeks.  Overall good prognosis for this injury was informed  If he feels comfortable with that, he can be seen as needed.    We talked about the remote possibility of pinning of the DIP joint if he wants to go without the brace.  He feels comfortable using the brace for the treatment for now.      Imaging Interpretation:   None today but visualized x-rays of the long finger from May 13, 2021 when he went to the urgent care.      Cornelius Diaz MD  Department of Orthopedic Surgery        Disclaimer: This note consists of symbols derived from keyboarding, dictation and/or voice recognition software. As a result, there may be errors in the script that have gone undetected. Please consider this when interpreting information found in this chart.        Again, thank you for allowing me to participate in the care of your patient.        Sincerely,        Cornelius Diaz MD

## 2021-06-11 DIAGNOSIS — K21.9 GASTROESOPHAGEAL REFLUX DISEASE WITHOUT ESOPHAGITIS: ICD-10-CM

## 2021-06-11 RX ORDER — OMEPRAZOLE 40 MG/1
CAPSULE, DELAYED RELEASE ORAL
Qty: 30 CAPSULE | Refills: 0 | Status: SHIPPED | OUTPATIENT
Start: 2021-06-11 | End: 2021-07-09

## 2021-06-11 NOTE — LETTER
[facility]  [address] [id number] [phone]            Nahum Chiu  [id number] [facility] [address]        Ajnelica 15, 2021    Dear Nahum,    While refilling your prescription today, we noticed that you are due for an appointment with your provider.  We will refill your prescription for 30 days, but a follow-up appointment must be made before any additional refills can be approved.     Taking care of your health is important to us and we look forward to seeing you in the near future.  Please call us at [phone] or [phone] (or use [phone]) to schedule an appointment.     Please disregard this notice if you have already made an appointment.    Sincerely,        [facility]

## 2021-06-11 NOTE — TELEPHONE ENCOUNTER
Refill done for short fill.    Needs F/U visit for further refills. Last seen in 8/2020.      Lidia Quintero MD on 6/11/2021 at 5:29 PM

## 2021-06-11 NOTE — TELEPHONE ENCOUNTER
Routing to PCP for review and refill as appropriate.    Omeprazole   Prescription approved per [facility] Refill Protocol.

## 2021-07-09 ENCOUNTER — TELEPHONE (OUTPATIENT)
Dept: FAMILY MEDICINE | Facility: CLINIC | Age: 41
End: 2021-07-09

## 2021-07-09 DIAGNOSIS — K21.9 GASTROESOPHAGEAL REFLUX DISEASE WITHOUT ESOPHAGITIS: ICD-10-CM

## 2021-07-09 RX ORDER — OMEPRAZOLE 40 MG/1
CAPSULE, DELAYED RELEASE ORAL
Qty: 90 CAPSULE | Refills: 0 | Status: SHIPPED | OUTPATIENT
Start: 2021-07-09 | End: 2021-09-28

## 2021-07-21 ENCOUNTER — OFFICE VISIT (OUTPATIENT)
Dept: INTERNAL MEDICINE | Facility: CLINIC | Age: 41
End: 2021-07-21
Payer: COMMERCIAL

## 2021-07-21 VITALS
OXYGEN SATURATION: 98 % | WEIGHT: 210 LBS | BODY MASS INDEX: 28.44 KG/M2 | HEIGHT: 72 IN | HEART RATE: 57 BPM | SYSTOLIC BLOOD PRESSURE: 100 MMHG | TEMPERATURE: 97.6 F | DIASTOLIC BLOOD PRESSURE: 74 MMHG

## 2021-07-21 DIAGNOSIS — Z00.00 ROUTINE GENERAL MEDICAL EXAMINATION AT A HEALTH CARE FACILITY: Primary | ICD-10-CM

## 2021-07-21 DIAGNOSIS — M20.011 MALLET DEFORMITY OF RIGHT MIDDLE FINGER: ICD-10-CM

## 2021-07-21 DIAGNOSIS — K21.9 GASTROESOPHAGEAL REFLUX DISEASE WITHOUT ESOPHAGITIS: ICD-10-CM

## 2021-07-21 PROCEDURE — 99396 PREV VISIT EST AGE 40-64: CPT | Performed by: FAMILY MEDICINE

## 2021-07-21 ASSESSMENT — ENCOUNTER SYMPTOMS
NAUSEA: 0
ARTHRALGIAS: 0
MYALGIAS: 0
ABDOMINAL PAIN: 0
DYSURIA: 0
CONSTIPATION: 0
NERVOUS/ANXIOUS: 0
FREQUENCY: 0
HEARTBURN: 0
COUGH: 0
DIZZINESS: 0
EYE PAIN: 0
CHILLS: 0
HEMATOCHEZIA: 0
PARESTHESIAS: 0
WEAKNESS: 0
SORE THROAT: 0
DIARRHEA: 0
PALPITATIONS: 0
HEMATURIA: 0
SHORTNESS OF BREATH: 0
JOINT SWELLING: 0
HEADACHES: 1
FEVER: 0

## 2021-07-21 ASSESSMENT — MIFFLIN-ST. JEOR: SCORE: 1885.55

## 2021-07-21 NOTE — PROGRESS NOTES
3  SUBJECTIVE:   CC: Nahum Chiu is an 41 year old male who presents for preventive health visit.       Patient has been advised of split billing requirements and indicates understanding: Yes  Healthy Habits:  Answers for HPI/ROS submitted by the patient on 7/21/2021  Frequency of exercise:: 4-5 days/week  Getting at least 3 servings of Calcium per day:: Yes  Diet:: Regular (no restrictions)  Taking medications regularly:: Yes  Medication side effects:: None  Bi-annual eye exam:: Yes  Dental care twice a year:: Yes  Sleep apnea or symptoms of sleep apnea:: None  abdominal pain: No  Blood in stool: No  Blood in urine: No  chest pain: No  chills: No  congestion: No  constipation: No  cough: No  diarrhea: No  dizziness: No  ear pain: No  eye pain: No  nervous/anxious: No  fever: No  frequency: No  genital sores: No  headaches: Yes  hearing loss: No  heartburn: No  arthralgias: No  joint swelling: No  peripheral edema: No  mood changes: No  myalgias: No  nausea: No  dysuria: No  palpitations: No  Skin sensation changes: No  sore throat: No  urgency: No  rash: No  shortness of breath: No  visual disturbance: No  weakness: No  impotence: No  penile discharge: No  Additional concerns today:: Yes  Duration of exercise:: 15-30 minutes        Today's PHQ-2 Score:   PHQ-2 ( 1999 [employer]) 3/17/2020 3/26/2019   Q1: Little interest or pleasure in doing things 0 0   Q2: Feeling down, depressed or hopeless 0 0   PHQ-2 Score 0 0       Abuse: Current or Past(Physical, Sexual or Emotional)- No  Do you feel safe in your environment? Yes    Have you ever done Advance Care Planning? (For example, a Health Directive, POLST, or a discussion with a medical provider or your loved ones about your wishes): No, advance care planning information given to patient to review.  Patient declined advance care planning discussion at this time.    Social History     Tobacco Use     Smoking status: Former Smoker     Packs/day: 1.00     Years: 13.00      Pack years: 13.00     Types: Cigarettes, Cigars, Dip, chew, snus or snuff     Start date: 1996     Quit date: 2009     Years since quittin.4     Smokeless tobacco: Never Used   Substance Use Topics     Alcohol use: Yes     Comment: minimal use     If you drink alcohol do you typically have >3 drinks per day or >7 drinks per week? No                      Last PSA: No results found for: PSA    Reviewed orders with patient. Reviewed health maintenance and updated orders accordingly - Yes-Tdap due    Patient Active Problem List   Diagnosis     GERD (gastroesophageal reflux disease)     Allergic reaction, initial encounter     Past Surgical History:   Procedure Laterality Date     ESOPHAGOSCOPY, GASTROSCOPY, DUODENOSCOPY (EGD), COMBINED N/A 3/26/2020    Procedure: ESOPHAGOGASTRODUODENOSCOPY, WITH BIOPSY;  Surgeon: Kendell Riley MD;  Location:  GI     HERNIA REPAIR         Social History     Tobacco Use     Smoking status: Former Smoker     Packs/day: 1.00     Years: 13.00     Pack years: 13.00     Types: Cigarettes, Cigars, Dip, chew, snus or snuff     Start date: 1996     Quit date: 2009     Years since quittin.4     Smokeless tobacco: Never Used   Substance Use Topics     Alcohol use: Yes     Comment: minimal use     Family History   Problem Relation Age of Onset     Lupus Mother      Diabetes No family hx of            Reviewed and updated as needed this visit by clinical staff                 Reviewed and updated as needed this visit by Provider                    ROS:  CONSTITUTIONAL: NEGATIVE for fever, chills, change in weight  INTEGUMENTARY/SKIN: NEGATIVE for worrisome rashes, moles or lesions  EYES: NEGATIVE for vision changes or irritation  ENT: NEGATIVE for ear, mouth and throat problems  RESP: NEGATIVE for significant cough or SOB  CV: NEGATIVE for chest pain, palpitations or peripheral edema  GI: NEGATIVE for nausea, abdominal pain, heartburn, or change in bowel  habits   male: negative for dysuria, hematuria, decreased urinary stream, erectile dysfunction, urethral discharge  MUSCULOSKELETAL:POSITIVE  for issues with his right third/long finger where he had a mallet deformity.  NEURO: NEGATIVE for weakness, dizziness or paresthesias  ENDOCRINE: NEGATIVE for temperature intolerance, skin/hair changes  HEME/ALLERGY/IMMUNE: NEGATIVE for bleeding problems  PSYCHIATRIC: NEGATIVE for changes in mood or affect    OBJECTIVE:   There were no vitals taken for this visit.  EXAM:  GENERAL: healthy, alert and no distress  EYES: Eyes grossly normal to inspection, PERRL and conjunctivae and sclerae normal  HENT: ear canals and TM's normal, nose and mouth without ulcers or lesions  NECK: no adenopathy, no asymmetry, masses, or scars and thyroid normal to palpation  RESP: lungs clear to auscultation - no rales, rhonchi or wheezes  CV: regular rate and rhythm, normal S1 S2, no S3 or S4, no murmur, click or rub, no peripheral edema and peripheral pulses strong  ABDOMEN: soft, nontender, no hepatosplenomegaly, no masses and bowel sounds normal   (male): normal male genitalia without lesions or urethral discharge, no hernia  MS: no gross musculoskeletal defects noted, no edema  SKIN: no suspicious lesions or rashes  NEURO: Normal strength and tone, mentation intact and speech normal  PSYCH: mentation appears normal, affect normal/bright        ASSESSMENT/PLAN:       ICD-10-CM    1. Routine general medical examination at a health care facility  Z00.00 Comprehensive metabolic panel (BMP + Alb, Alk Phos, ALT, AST, Total. Bili, TP)     CBC with platelets and differential     Lipid panel reflex to direct LDL Fasting     UA with Microscopic reflex to Culture - lab collect   2. Gastroesophageal reflux disease without esophagitis  K21.9    3. Mallet deformity of right middle finger  M20.011     History of       Patient has been advised of split billing requirements and indicates understanding:  Yes  COUNSELING:  Reviewed preventive health counseling, as reflected in patient instructions       Regular exercise       Healthy diet/nutrition    Estimated body mass index is 27.53 kg/m  as calculated from the following:    Height as of 5/26/21: 1.829 m (6').    Weight as of 5/26/21: 92.1 kg (203 lb).    Weight management plan: Discussed healthy diet and exercise guidelines    He reports that he quit smoking about 12 years ago. His smoking use included cigarettes, cigars, and dip, chew, snus or snuff. He started smoking about 24 years ago. He has a 13.00 pack-year smoking history. He has never used smokeless tobacco.      Counseling Resources:  ATP IV Guidelines  Pooled Cohorts Equation Calculator  FRAX Risk Assessment  ICSI Preventive Guidelines  Dietary Guidelines for Americans, 2010  USDA's MyPlate  ASA Prophylaxis  Lung CA Screening    Carlo Hudson MD  [facility]

## 2021-09-27 DIAGNOSIS — K21.9 GASTROESOPHAGEAL REFLUX DISEASE WITHOUT ESOPHAGITIS: ICD-10-CM

## 2021-09-28 RX ORDER — OMEPRAZOLE 40 MG/1
CAPSULE, DELAYED RELEASE ORAL
Qty: 90 CAPSULE | Refills: 0 | Status: SHIPPED | OUTPATIENT
Start: 2021-09-28 | End: 2021-12-18

## 2021-09-28 NOTE — CONFIDENTIAL NOTE
Prescription approved per [facility] Refill Protocol.  Pt to schedule OV for reestablished provoder

## 2021-10-03 ENCOUNTER — HEALTH MAINTENANCE LETTER (OUTPATIENT)
Age: 41
End: 2021-10-03

## 2021-11-08 ENCOUNTER — LAB (OUTPATIENT)
Dept: LAB | Facility: CLINIC | Age: 41
End: 2021-11-08
Payer: COMMERCIAL

## 2021-11-08 DIAGNOSIS — Z00.00 ROUTINE GENERAL MEDICAL EXAMINATION AT A HEALTH CARE FACILITY: ICD-10-CM

## 2021-11-08 LAB
ALBUMIN SERPL-MCNC: 3.7 G/DL (ref 3.4–5)
ALBUMIN UR-MCNC: NEGATIVE MG/DL
ALP SERPL-CCNC: 43 U/L (ref 40–150)
ALT SERPL W P-5'-P-CCNC: 23 U/L (ref 0–70)
ANION GAP SERPL CALCULATED.3IONS-SCNC: 3 MMOL/L (ref 3–14)
APPEARANCE UR: CLEAR
AST SERPL W P-5'-P-CCNC: 12 U/L (ref 0–45)
BACTERIA #/AREA URNS HPF: ABNORMAL /HPF
BASOPHILS # BLD AUTO: 0 10E3/UL (ref 0–0.2)
BASOPHILS NFR BLD AUTO: 0 %
BILIRUB SERPL-MCNC: 0.7 MG/DL (ref 0.2–1.3)
BILIRUB UR QL STRIP: NEGATIVE
BUN SERPL-MCNC: 15 MG/DL (ref 7–30)
CALCIUM SERPL-MCNC: 9.2 MG/DL (ref 8.5–10.1)
CHLORIDE BLD-SCNC: 107 MMOL/L (ref 94–109)
CHOLEST SERPL-MCNC: 193 MG/DL
CO2 SERPL-SCNC: 27 MMOL/L (ref 20–32)
COLOR UR AUTO: YELLOW
CREAT SERPL-MCNC: 0.98 MG/DL (ref 0.66–1.25)
EOSINOPHIL # BLD AUTO: 0.4 10E3/UL (ref 0–0.7)
EOSINOPHIL NFR BLD AUTO: 7 %
ERYTHROCYTE [DISTWIDTH] IN BLOOD BY AUTOMATED COUNT: 12.4 % (ref 10–15)
FASTING STATUS PATIENT QL REPORTED: YES
GFR SERPL CREATININE-BSD FRML MDRD: >90 ML/MIN/1.73M2
GLUCOSE BLD-MCNC: 91 MG/DL (ref 70–99)
GLUCOSE UR STRIP-MCNC: NEGATIVE MG/DL
HCT VFR BLD AUTO: 46.5 % (ref 40–53)
HDLC SERPL-MCNC: 53 MG/DL
HGB BLD-MCNC: 15.7 G/DL (ref 13.3–17.7)
HGB UR QL STRIP: NEGATIVE
KETONES UR STRIP-MCNC: NEGATIVE MG/DL
LDLC SERPL CALC-MCNC: 117 MG/DL
LEUKOCYTE ESTERASE UR QL STRIP: NEGATIVE
LYMPHOCYTES # BLD AUTO: 1.9 10E3/UL (ref 0.8–5.3)
LYMPHOCYTES NFR BLD AUTO: 35 %
MCH RBC QN AUTO: 30.8 PG (ref 26.5–33)
MCHC RBC AUTO-ENTMCNC: 33.8 G/DL (ref 31.5–36.5)
MCV RBC AUTO: 91 FL (ref 78–100)
MONOCYTES # BLD AUTO: 0.4 10E3/UL (ref 0–1.3)
MONOCYTES NFR BLD AUTO: 7 %
MUCOUS THREADS #/AREA URNS LPF: PRESENT /LPF
NEUTROPHILS # BLD AUTO: 2.9 10E3/UL (ref 1.6–8.3)
NEUTROPHILS NFR BLD AUTO: 51 %
NITRATE UR QL: NEGATIVE
NONHDLC SERPL-MCNC: 140 MG/DL
PH UR STRIP: 5.5 [PH] (ref 5–7)
PLATELET # BLD AUTO: 215 10E3/UL (ref 150–450)
POTASSIUM BLD-SCNC: 4.2 MMOL/L (ref 3.4–5.3)
PROT SERPL-MCNC: 7.3 G/DL (ref 6.8–8.8)
RBC # BLD AUTO: 5.09 10E6/UL (ref 4.4–5.9)
RBC #/AREA URNS AUTO: ABNORMAL /HPF
SODIUM SERPL-SCNC: 137 MMOL/L (ref 133–144)
SP GR UR STRIP: 1.02 (ref 1–1.03)
TRIGL SERPL-MCNC: 116 MG/DL
UROBILINOGEN UR STRIP-ACNC: 0.2 E.U./DL
WBC # BLD AUTO: 5.6 10E3/UL (ref 4–11)
WBC #/AREA URNS AUTO: ABNORMAL /HPF

## 2021-11-08 PROCEDURE — 80053 COMPREHEN METABOLIC PANEL: CPT

## 2021-11-08 PROCEDURE — 81001 URINALYSIS AUTO W/SCOPE: CPT

## 2021-11-08 PROCEDURE — 85025 COMPLETE CBC W/AUTO DIFF WBC: CPT

## 2021-11-08 PROCEDURE — 80061 LIPID PANEL: CPT

## 2021-11-08 PROCEDURE — 36415 COLL VENOUS BLD VENIPUNCTURE: CPT

## 2021-11-09 ENCOUNTER — E-VISIT (OUTPATIENT)
Dept: FAMILY MEDICINE | Facility: CLINIC | Age: 41
End: 2021-11-09
Payer: COMMERCIAL

## 2021-11-09 DIAGNOSIS — R82.90 URINE FINDING: Primary | ICD-10-CM

## 2021-11-09 PROCEDURE — 99207 PR NON-BILLABLE SERV PER CHARTING: CPT | Performed by: PHYSICIAN ASSISTANT

## 2021-12-03 ENCOUNTER — E-VISIT (OUTPATIENT)
Dept: FAMILY MEDICINE | Facility: CLINIC | Age: 41
End: 2021-12-03
Payer: COMMERCIAL

## 2021-12-03 DIAGNOSIS — K21.9 GASTROESOPHAGEAL REFLUX DISEASE WITHOUT ESOPHAGITIS: Primary | ICD-10-CM

## 2021-12-03 PROCEDURE — 99207 PR NON-BILLABLE SERV PER CHARTING: CPT | Performed by: PHYSICIAN ASSISTANT

## 2021-12-03 NOTE — PATIENT INSTRUCTIONS
Thank you for choosing us for your care. I think an in-clinic visit would be best next steps based on your symptoms. Please schedule a clinic appointment; you won t be charged for this eVisit.      If you have new or different chest pain, or the omeprazole is not helping your symptoms - you need to be seen and evaluated urgently.  The emergency room is the best place to go if you have chest pain.     You can schedule an appointment right here in [facility], or call [phone]

## 2021-12-13 ENCOUNTER — OFFICE VISIT (OUTPATIENT)
Dept: FAMILY MEDICINE | Facility: CLINIC | Age: 41
End: 2021-12-13
Payer: COMMERCIAL

## 2021-12-13 VITALS
HEART RATE: 75 BPM | HEIGHT: 73 IN | WEIGHT: 210 LBS | TEMPERATURE: 98.2 F | RESPIRATION RATE: 16 BRPM | OXYGEN SATURATION: 96 % | SYSTOLIC BLOOD PRESSURE: 118 MMHG | BODY MASS INDEX: 27.83 KG/M2 | DIASTOLIC BLOOD PRESSURE: 80 MMHG

## 2021-12-13 DIAGNOSIS — R19.5 CHANGE IN STOOL: ICD-10-CM

## 2021-12-13 DIAGNOSIS — G44.52 NEW DAILY PERSISTENT HEADACHE: Primary | ICD-10-CM

## 2021-12-13 DIAGNOSIS — R39.9 LOWER URINARY TRACT SYMPTOMS (LUTS): ICD-10-CM

## 2021-12-13 LAB
ALBUMIN UR-MCNC: NEGATIVE MG/DL
APPEARANCE UR: CLEAR
BACTERIA #/AREA URNS HPF: ABNORMAL /HPF
BILIRUB UR QL STRIP: NEGATIVE
COLOR UR AUTO: YELLOW
GLUCOSE UR STRIP-MCNC: NEGATIVE MG/DL
HGB UR QL STRIP: NEGATIVE
KETONES UR STRIP-MCNC: NEGATIVE MG/DL
LEUKOCYTE ESTERASE UR QL STRIP: NEGATIVE
NITRATE UR QL: NEGATIVE
PH UR STRIP: 5 [PH] (ref 5–7)
RBC #/AREA URNS AUTO: ABNORMAL /HPF
SP GR UR STRIP: >=1.03 (ref 1–1.03)
SQUAMOUS #/AREA URNS AUTO: ABNORMAL /LPF
UROBILINOGEN UR STRIP-ACNC: 0.2 E.U./DL
WBC #/AREA URNS AUTO: ABNORMAL /HPF

## 2021-12-13 PROCEDURE — 90715 TDAP VACCINE 7 YRS/> IM: CPT | Performed by: FAMILY MEDICINE

## 2021-12-13 PROCEDURE — 81001 URINALYSIS AUTO W/SCOPE: CPT | Performed by: FAMILY MEDICINE

## 2021-12-13 PROCEDURE — 99213 OFFICE O/P EST LOW 20 MIN: CPT | Mod: 25 | Performed by: FAMILY MEDICINE

## 2021-12-13 PROCEDURE — 90471 IMMUNIZATION ADMIN: CPT | Performed by: FAMILY MEDICINE

## 2021-12-13 ASSESSMENT — MIFFLIN-ST. JEOR: SCORE: 1893.49

## 2021-12-13 ASSESSMENT — ENCOUNTER SYMPTOMS
HEMATOCHEZIA: 0
DYSURIA: 1
HEADACHES: 1
ABDOMINAL PAIN: 1
DIARRHEA: 1
CONSTIPATION: 1
FEVER: 0

## 2021-12-13 NOTE — PROGRESS NOTES
"  Assessment & Plan     New daily persistent headache  Subacute problem.  Recommend headache diary, Excedrin as needed.  Nonfocal neurological exam, no current suspicions for infection such as meningitis.  No red flag symptoms.  Consider neurology referral if not improved.  Could also trial Imitrex.    Change in stool  Continue Metamucil, start enteric-coated peppermint oil.  If refractory, consider GI referral or colonoscopy.    Lower urinary tract symptoms (LUTS)  Resolved, reassured.  UA without evidence of UTI  - UA with Microscopic reflex to Culture - lab collect  - UA with Microscopic reflex to Culture - lab collect  - Urine Microscopic        Return in about 2 weeks (around 12/27/2021) for If symptoms do not improve or gets worse..    Roman Reilly MD  [facility] is a 41 year old who presents for the following health issues     History of Present Illness       Migraines:   Since the patient's last clinic visit, headaches are: no change  The patient is getting headaches:  Dull headaches most days  He is not able to do normal daily activities when he has a migraine.  The patient is taking the following rescue/relief medications:  Excedrin   Patient states \"I get some relief\" from the rescue/relief medications.   The patient is taking the following medications to prevent migraines:  No medications to prevent migraines  In the past 4 weeks, the patient has gone to an Urgent Care or Emergency Room 0 times times due to headaches.    He eats 2-3 servings of fruits and vegetables daily.He consumes 0 sweetened beverage(s) daily.He exercises with enough effort to increase his heart rate 20 to 29 minutes per day.  He exercises with enough effort to increase his heart rate 4 days per week.   He is taking medications regularly.     41-year-old male who presents establish care    Patient has multiple medical concerns.    Intermittent headaches, dull feels like he is floating in " "the clouds, the past has a history of tension headaches however denies any neck pain.  These headaches are nonpulsatile, does not get modified by exertion such as with intercourse.  Does not wake up in the middle the night due to headaches.    Patient's last PCP retired, he had a urinalysis which had mucus.  No painful urination, he would like to have this rechecked.    Also intermittently having stool changes, [place] 6 on the scale.  1-2 bowel movements a day, symptoms refractory to Metamucil.  Has a localized abdominal cramp not improved with defecation.  Distant history of anxiety previously was on Zoloft.  Denies any current stressors.  No fevers or unintentional weight loss.  No family history of inflammatory bowel disease such as Crohn's or ulcerative colitis.        Review of Systems   Constitutional: Negative for fever.   Gastrointestinal: Positive for abdominal pain, constipation and diarrhea. Negative for hematochezia.   Genitourinary: Positive for dysuria.   Neurological: Positive for headaches.            Objective    /80 (BP Location: Right arm, Patient Position: Sitting, Cuff Size: Adult Regular)   Pulse 75   Temp 98.2  F (36.8  C) (Oral)   Resp 16   Ht 1.842 m (6' 0.5\")   Wt 95.3 kg (210 lb)   SpO2 96%   BMI 28.09 kg/m    Body mass index is 28.09 kg/m .  Physical Exam  Vitals reviewed.   Constitutional:       General: He is not in acute distress.     Appearance: Normal appearance. He is not ill-appearing.   HENT:      Head: Normocephalic and atraumatic.      Right Ear: External ear normal.      Left Ear: External ear normal.      Nose: Nose normal.      Mouth/Throat:      Pharynx: Oropharynx is clear.   Eyes:      General: No scleral icterus.        Right eye: No discharge.         Left eye: No discharge.      Extraocular Movements: Extraocular movements intact.      Pupils: Pupils are equal, round, and reactive to light.   Neck:      Vascular: No JVD.   Cardiovascular:      Rate and " Rhythm: Normal rate and regular rhythm.   Pulmonary:      Effort: Pulmonary effort is normal. No respiratory distress.      Breath sounds: Normal breath sounds.   Abdominal:      General: Abdomen is flat. Bowel sounds are normal. There is no distension.      Palpations: Abdomen is soft.      Tenderness: There is no abdominal tenderness.   Musculoskeletal:         General: No swelling.      Cervical back: Normal range of motion.   Lymphadenopathy:      Cervical: No cervical adenopathy.   Skin:     General: Skin is warm.      Capillary Refill: Capillary refill takes less than 2 seconds.   Neurological:      Mental Status: He is alert.   Psychiatric:         Mood and Affect: Mood normal.         Behavior: Behavior normal.            Results for orders placed or performed in visit on 12/13/21 (from the past 24 hour(s))   UA with Microscopic reflex to Culture - lab collect    Specimen: Urine, Clean Catch   Result Value Ref Range    Color Urine Yellow Colorless, Straw, Light Yellow, Yellow    Appearance Urine Clear Clear    Glucose Urine Negative Negative mg/dL    Bilirubin Urine Negative Negative    Ketones Urine Negative Negative mg/dL    Specific Gravity Urine >=1.030 1.003 - 1.035    Blood Urine Negative Negative    pH Urine 5.0 5.0 - 7.0    Protein Albumin Urine Negative Negative mg/dL    Urobilinogen Urine 0.2 0.2, 1.0 E.U./dL    Nitrite Urine Negative Negative    Leukocyte Esterase Urine Negative Negative   Urine Microscopic   Result Value Ref Range    Bacteria Urine Few (A) None Seen /HPF    RBC Urine None Seen 0-2 /HPF /HPF    WBC Urine 0-5 0-5 /HPF /HPF    Squamous Epithelials Urine None Seen None Seen /LPF    Narrative    Urine Culture not indicated

## 2021-12-17 DIAGNOSIS — K21.9 GASTROESOPHAGEAL REFLUX DISEASE WITHOUT ESOPHAGITIS: ICD-10-CM

## 2021-12-18 ENCOUNTER — OFFICE VISIT (OUTPATIENT)
Dept: FAMILY MEDICINE | Facility: CLINIC | Age: 41
End: 2021-12-18
Payer: COMMERCIAL

## 2021-12-18 VITALS
WEIGHT: 206 LBS | HEART RATE: 89 BPM | BODY MASS INDEX: 27.55 KG/M2 | RESPIRATION RATE: 16 BRPM | OXYGEN SATURATION: 97 % | DIASTOLIC BLOOD PRESSURE: 86 MMHG | SYSTOLIC BLOOD PRESSURE: 127 MMHG

## 2021-12-18 DIAGNOSIS — R53.83 FATIGUE, UNSPECIFIED TYPE: ICD-10-CM

## 2021-12-18 DIAGNOSIS — F32.A DEPRESSION, UNSPECIFIED DEPRESSION TYPE: Primary | ICD-10-CM

## 2021-12-18 DIAGNOSIS — F41.9 ANXIETY: ICD-10-CM

## 2021-12-18 PROCEDURE — 84443 ASSAY THYROID STIM HORMONE: CPT

## 2021-12-18 PROCEDURE — 82306 VITAMIN D 25 HYDROXY: CPT

## 2021-12-18 PROCEDURE — 99215 OFFICE O/P EST HI 40 MIN: CPT

## 2021-12-18 PROCEDURE — 36415 COLL VENOUS BLD VENIPUNCTURE: CPT

## 2021-12-18 RX ORDER — PANTOPRAZOLE SODIUM 40 MG/1
TABLET, DELAYED RELEASE ORAL
COMMUNITY
Start: 2021-12-17

## 2021-12-18 RX ORDER — HYDROXYZINE HYDROCHLORIDE 25 MG/1
25 TABLET, FILM COATED ORAL EVERY 8 HOURS PRN
Qty: 30 TABLET | Refills: 0 | Status: SHIPPED | OUTPATIENT
Start: 2021-12-18 | End: 2022-06-06 | Stop reason: SINTOL

## 2021-12-18 RX ORDER — SULFAMETHOXAZOLE AND TRIMETHOPRIM 400; 80 MG/1; MG/1
TABLET ORAL
COMMUNITY
Start: 2021-12-17 | End: 2022-01-18

## 2021-12-18 RX ORDER — SUCRALFATE 1 G/1
TABLET ORAL
COMMUNITY
Start: 2021-12-17 | End: 2022-06-06

## 2021-12-18 ASSESSMENT — ANXIETY QUESTIONNAIRES
1. FEELING NERVOUS, ANXIOUS, OR ON EDGE: MORE THAN HALF THE DAYS
6. BECOMING EASILY ANNOYED OR IRRITABLE: NOT AT ALL
5. BEING SO RESTLESS THAT IT IS HARD TO SIT STILL: NOT AT ALL
GAD7 TOTAL SCORE: 8
2. NOT BEING ABLE TO STOP OR CONTROL WORRYING: NEARLY EVERY DAY
3. WORRYING TOO MUCH ABOUT DIFFERENT THINGS: SEVERAL DAYS
7. FEELING AFRAID AS IF SOMETHING AWFUL MIGHT HAPPEN: SEVERAL DAYS
IF YOU CHECKED OFF ANY PROBLEMS ON THIS QUESTIONNAIRE, HOW DIFFICULT HAVE THESE PROBLEMS MADE IT FOR YOU TO DO YOUR WORK, TAKE CARE OF THINGS AT HOME, OR GET ALONG WITH OTHER PEOPLE: VERY DIFFICULT

## 2021-12-18 ASSESSMENT — PATIENT HEALTH QUESTIONNAIRE - PHQ9
SUM OF ALL RESPONSES TO PHQ QUESTIONS 1-9: 17
5. POOR APPETITE OR OVEREATING: SEVERAL DAYS

## 2021-12-18 NOTE — PATIENT INSTRUCTIONS
If you feel that you need help immediately, please go to [facility]. This is a 24 hour emergency clinic for mental health clinic that is open 24/7    Labs will come back through my chart    Mental Health referral- they should call you on Monday, if they have not called by 1pm, please call them to set up an appointment     Follow up with Dr. Reilly on 12/28/21 at 1:30. This will be a video visit.

## 2021-12-18 NOTE — PROGRESS NOTES
Assessment & Plan     Depression, unspecified depression type  Educated on empath clinic at [facility] should he need immediate attention.  PHQ 9 and alida 7 completed in EMR  - Adult Mental Health Referral; Future    Anxiety    - hydrOXYzine (ATARAX) 25 MG tablet; Take 1 tablet (25 mg) by mouth every 8 hours as needed for anxiety    Fatigue, unspecified type  Recent CBC and basic metabolic panel done in November of this year were within normal limits.  We will just recheck a TSH and vitamin D today.    - TSH with free T4 reflex; Future  - Vitamin D deficiency screening; Future  - TSH with free T4 reflex  - Vitamin D deficiency screening      40 minutes spent on the date of the encounter doing chart review, patient visit and documentation        See Patient Instructions    Return for Follow up on 12/28 as scheduled. .    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 Fatigue, brain fog  Depression (if yes, do PHQ-9): YES  Anxiety (if yes, do ALIDA-7): YES  Accompanying Signs & Symptoms:  Still participating in activities that you used to enjoy: YES- sometimes, but is hard to do  Fatigue: YES  Irritability: no  Difficulty concentrating: YES  Changes in appetite: YES- Saw GI this week for stomach issues  Problems with sleep: YES  Heart racing/beating fast: no  Abnormally elevated, expansive, or irritable mood: no  Persistently increased activity or energy: no  Thoughts of hurting yourself or others: no  History:  Recent stress or major life event: no  Prior depression or anxiety: anxiety many years ago. Was on medications for this  Alcohol/drug use: no  Difficulty sleeping: YES  Precipitating or alleviating factors: None  Therapies tried and  outcome: none    Works as a  at the [employer]. Is  with children. Has good family support with his sisters and his wife. Feels his wife is over whelmed with what he is now experiencing. His sister brought him into Urgent Care today.      Review of Systems   Constitutional, HEENT, cardiovascular, pulmonary, gi and gu systems are negative, except as otherwise noted.      Objective    /86 (BP Location: Right arm, Patient Position: Chair, Cuff Size: Adult Large)   Pulse 89   Resp 16   Wt 93.4 kg (206 lb)   SpO2 97%   BMI 27.55 kg/m    Body mass index is 27.55 kg/m .  Physical Exam   GENERAL APPEARANCE: healthy, alert and no distress  PSYCH: patient appearance well groomed, fatigued, makes good eye contact with conversation.

## 2021-12-19 LAB — TSH SERPL DL<=0.005 MIU/L-ACNC: 1.7 MU/L (ref 0.4–4)

## 2021-12-19 ASSESSMENT — ANXIETY QUESTIONNAIRES: GAD7 TOTAL SCORE: 8

## 2021-12-20 LAB — DEPRECATED CALCIDIOL+CALCIFEROL SERPL-MC: 34 UG/L (ref 20–75)

## 2021-12-20 RX ORDER — OMEPRAZOLE 40 MG/1
CAPSULE, DELAYED RELEASE ORAL
Qty: 90 CAPSULE | Refills: 0 | OUTPATIENT
Start: 2021-12-20

## 2021-12-28 ENCOUNTER — VIRTUAL VISIT (OUTPATIENT)
Dept: FAMILY MEDICINE | Facility: CLINIC | Age: 41
End: 2021-12-28
Payer: COMMERCIAL

## 2021-12-28 DIAGNOSIS — R51.9 NONINTRACTABLE EPISODIC HEADACHE, UNSPECIFIED HEADACHE TYPE: ICD-10-CM

## 2021-12-28 DIAGNOSIS — R10.13 DYSPEPSIA: Primary | ICD-10-CM

## 2021-12-28 PROCEDURE — 99213 OFFICE O/P EST LOW 20 MIN: CPT | Mod: GT | Performed by: FAMILY MEDICINE

## 2021-12-28 ASSESSMENT — ENCOUNTER SYMPTOMS
NERVOUS/ANXIOUS: 1
HEADACHES: 1

## 2021-12-28 NOTE — PROGRESS NOTES
Nahum is a 41 year old who is being evaluated via a billable video visit.      How would you like to obtain your AVS? MyChart  If the video visit is dropped, the invitation should be resent by: Text to cell phone: [phone]  Will anyone else be joining your video visit? No      Video Start Time: 1:30 PM    Assessment & Plan     Dyspepsia  Continue current medical management, will be working with [place] [facility], they have switched his PPI to pantoprazole and started Carafate which I do agree with.  Failed trial of peppermint oil for possible irritable bowel syndrome.  Given his longstanding history of PPI use and worsening dyspepsia I do agree that patient is a good candidate for having a double endoscopy.  Otherwise follow-up with GI     Nonintractable episodic headache, unspecified headache type  Patient is currently following with ENT and will have a CT sinus study for further evaluation, continue current medical management.      Return in about 6 months (around 6/28/2022) for Annual Preventative Visit..    Roman Reilly MD  [facility]   Nahum is a 41 year old who presents for the following health issues     HPI     Patient is a pleasant 41-year-old male who is having a follow-up for abdominal discomfort and headaches.  In the interim after talking to his family self-referred to [place] [facility] where he was switched to pantoprazole and started on Carafate, he reports some improvement of symptoms.  He has follow-up scheduled in January potentially getting upper or lower endoscopy.    Additionally had a evaluation where they suspect possible adenoid, staph infection and potentially sinus issues.  He will be undergoing a CT sinus study.  He thinks possibly the headaches are related to the recent uses of Bactrim.  Denies any skin involvement while going through this understandably he    Has become more anxious and was encouraged by his family to seek medical help,  subsequently went to the [facility] urgent care and was prescribed Atarax which she has not yet taken for some anxiety.  In his 20s, he was previously on anxiety medications, namely Zoloft for about 3 years.    SRINIVAS-7 SCORE 12/18/2021   Total Score 8             Review of Systems   Neurological: Positive for headaches.   Psychiatric/Behavioral: The patient is nervous/anxious.             Objective           Vitals:  No vitals were obtained today due to virtual visit.    Physical Exam   GENERAL: Healthy, alert and no distress  EYES: Eyes grossly normal to inspection.  No discharge or erythema, or obvious scleral/conjunctival abnormalities.  RESP: No audible wheeze, cough, or visible cyanosis.  No visible retractions or increased work of breathing.    SKIN: Visible skin clear. No significant rash, abnormal pigmentation or lesions.  NEURO: Cranial nerves grossly intact.  Mentation and speech appropriate for age.  PSYCH: Mentation appears normal, affect normal/bright, judgement and insight intact, normal speech and appearance well-groomed.        Video-Visit Details    Type of service:  Video Visit    Video End Time:2:02 PM    Originating Location (pt. Location): Home    Distant Location (provider location):  [facility]     Platform used for Video Visit: SmartExposee

## 2021-12-29 ENCOUNTER — VIRTUAL VISIT (OUTPATIENT)
Dept: PSYCHOLOGY | Facility: CLINIC | Age: 41
End: 2021-12-29
Attending: NURSE PRACTITIONER
Payer: COMMERCIAL

## 2021-12-29 DIAGNOSIS — F43.23 ADJUSTMENT DISORDER WITH MIXED ANXIETY AND DEPRESSED MOOD: Primary | ICD-10-CM

## 2021-12-29 PROCEDURE — 90791 PSYCH DIAGNOSTIC EVALUATION: CPT | Mod: GT | Performed by: COUNSELOR

## 2021-12-29 ASSESSMENT — ANXIETY QUESTIONNAIRES
GAD7 TOTAL SCORE: 6
GAD7 TOTAL SCORE: 6
7. FEELING AFRAID AS IF SOMETHING AWFUL MIGHT HAPPEN: SEVERAL DAYS
2. NOT BEING ABLE TO STOP OR CONTROL WORRYING: SEVERAL DAYS
7. FEELING AFRAID AS IF SOMETHING AWFUL MIGHT HAPPEN: SEVERAL DAYS
3. WORRYING TOO MUCH ABOUT DIFFERENT THINGS: SEVERAL DAYS
4. TROUBLE RELAXING: SEVERAL DAYS
4. TROUBLE RELAXING: SEVERAL DAYS
3. WORRYING TOO MUCH ABOUT DIFFERENT THINGS: SEVERAL DAYS
GAD7 TOTAL SCORE: 6
2. NOT BEING ABLE TO STOP OR CONTROL WORRYING: SEVERAL DAYS
5. BEING SO RESTLESS THAT IT IS HARD TO SIT STILL: NOT AT ALL
5. BEING SO RESTLESS THAT IT IS HARD TO SIT STILL: NOT AT ALL
6. BECOMING EASILY ANNOYED OR IRRITABLE: NOT AT ALL
1. FEELING NERVOUS, ANXIOUS, OR ON EDGE: MORE THAN HALF THE DAYS
1. FEELING NERVOUS, ANXIOUS, OR ON EDGE: MORE THAN HALF THE DAYS
GAD7 TOTAL SCORE: 6
GAD7 TOTAL SCORE: 6
6. BECOMING EASILY ANNOYED OR IRRITABLE: NOT AT ALL
GAD7 TOTAL SCORE: 6

## 2021-12-29 ASSESSMENT — COLUMBIA-SUICIDE SEVERITY RATING SCALE - C-SSRS
ATTEMPT LIFETIME: NO
ATTEMPT PAST THREE MONTHS: NO
1. IN THE PAST MONTH, HAVE YOU WISHED YOU WERE DEAD OR WISHED YOU COULD GO TO SLEEP AND NOT WAKE UP?: NO
TOTAL  NUMBER OF INTERRUPTED ATTEMPTS PAST 3 MONTHS: NO
6. HAVE YOU EVER DONE ANYTHING, STARTED TO DO ANYTHING, OR PREPARED TO DO ANYTHING TO END YOUR LIFE?: NO
2. HAVE YOU ACTUALLY HAD ANY THOUGHTS OF KILLING YOURSELF LIFETIME?: NO
4. HAVE YOU HAD THESE THOUGHTS AND HAD SOME INTENTION OF ACTING ON THEM?: NO
1. IN THE PAST MONTH, HAVE YOU WISHED YOU WERE DEAD OR WISHED YOU COULD GO TO SLEEP AND NOT WAKE UP?: NO
2. HAVE YOU ACTUALLY HAD ANY THOUGHTS OF KILLING YOURSELF?: NO
TOTAL  NUMBER OF ABORTED OR SELF INTERRUPTED ATTEMPTS PAST 3 MONTHS: NO
4. HAVE YOU HAD THESE THOUGHTS AND HAD SOME INTENTION OF ACTING ON THEM?: NO
3. HAVE YOU BEEN THINKING ABOUT HOW YOU MIGHT KILL YOURSELF?: NO
5. HAVE YOU STARTED TO WORK OUT OR WORKED OUT THE DETAILS OF HOW TO KILL YOURSELF? DO YOU INTEND TO CARRY OUT THIS PLAN?: NO
TOTAL  NUMBER OF INTERRUPTED ATTEMPTS LIFETIME: NO
5. HAVE YOU STARTED TO WORK OUT OR WORKED OUT THE DETAILS OF HOW TO KILL YOURSELF? DO YOU INTEND TO CARRY OUT THIS PLAN?: NO
6. HAVE YOU EVER DONE ANYTHING, STARTED TO DO ANYTHING, OR PREPARED TO DO ANYTHING TO END YOUR LIFE?: NO
TOTAL  NUMBER OF ABORTED OR SELF INTERRUPTED ATTEMPTS PAST LIFETIME: NO

## 2021-12-29 ASSESSMENT — PATIENT HEALTH QUESTIONNAIRE - PHQ9: SUM OF ALL RESPONSES TO PHQ QUESTIONS 1-9: 9

## 2021-12-29 NOTE — PROGRESS NOTES
"[facility]  Provider Name: Marija Arroyo Yane     Credentials:  LADC, LPCC    PATIENT'S NAME: Nahum Chiu  PREFERRED NAME: Nahum  PRONOUNS: he, him, his  MRN: [id number]  : 1980  ADDRESS: [address] [address]  ACCT. NUMBER:  [id number]  DATE OF SERVICE: 21  START TIME: 2:03pm  END TIME: 2:57pm  PREFERRED PHONE: [phone]  May we leave a program related message: Yes  SERVICE MODALITY:  Video Visit:      Provider verified identity through the following two step process.  Patient provided:  Patient  and Patient address    Telemedicine Visit: The patient's condition can be safely assessed and treated via synchronous audio and visual telemedicine encounter.      Reason for Telemedicine Visit: Services only offered telehealth    Originating Site (Patient Location): Patient's home    Distant Site (Provider Location): Provider Remote Setting- Home Office    Consent:  The patient/guardian has verbally consented to: the potential risks and benefits of telemedicine (video visit) versus in person care; bill my insurance or make self-payment for services provided; and responsibility for payment of non-covered services.     Patient would like the video invitation sent by:  My Chart    Mode of Communication:  Video Conference via Amwell    As the provider I attest to compliance with applicable laws and regulations related to telemedicine.    UNIVERSAL ADULT Mental Health DIAGNOSTIC ASSESSMENT    Identifying Information:  Patient is a 41 year old,  .  The pronoun use throughout this assessment reflects the patient's chosen pronoun.  Patient was referred for an assessment by primary care clinic.  Patient attended the session alone.    Chief Complaint:   The reason for seeking services at this time is: \"Problems with anxiety, headaches, etc \".  The patient reports \"taking little things and then thinking it's the worst\". He reports anxiety related to a fear of " "health specifically related to headaches, and a diminished interest in things that typically would bring him lucius.  The problem(s) began feeling the headaches and stomachaches after thanksgiving with some worry that eventually intensified a few weeks after.     Patient has not attempted to resolve these concerns in the past.    Social/Family History:  Patient reported they grew up in [place], [place].  They were raised by biological parents; biological mother; biological father .  Parents one or both remarried.  Patient reported that their childhood was \"slightly complicated\". Patient reports mom and father were never , mom  stepfather which led to a rough transition. Overall, patient states his family was \"full of nothing but love\".  Patient described their current relationships with family of origin as \"really good\".     The patient describes their cultural background as .  Cultural influences and impact on patient's life structure, values, norms, and healthcare: N/a.  Contextual influences on patient's health include: Individual Factors -.    These factors will be addressed in the Preliminary Treatment plan. Patient identified their preferred language to be English. Patient reported they does not need the assistance of an  or other support involved in therapy.     Patient reported had no significant delays in developmental tasks.   Patient's highest education level was associate degree / vocational certificate  .  Patient identified the following learning problems: none reported.  Modifications will not be used to assist communication in therapy.  Patient reports they are  able to understand written materials.    Patient reported the following relationship history.  Patient's current relationship status is  for 10 years.  Patient identified their sexual orientation as heterosexual.  Patient reported having 2 child(ambrocio). Patient identified parents; " siblings; spouse as part of their support system.  Patient identified the quality of these relationships as stable and meaningful.      Patient's current living/housing situation involves staying in own home/apartment.  The immediate members of family and household include Moi, 40,Wife and two children and they report that housing is stable.    Patient is currently employed fulltime.  Patient reports their finances are obtained through employment. Patient does not identify finances as a current stressor.      Patient reported that they have not been involved with the legal system.  Patient does not report being under probation/ parole/ jurisdiction.     Patient's Strengths and Limitations:  Patient identified the following strengths or resources that will help them succeed in treatment: friends / good social support, family support, insight and motivation. Things that may interfere with the patient's success in treatment include: none identified.     Personal and Family Medical History:  Patient does not report a family history of mental health concerns.  Patient reports family history includes Brain Tumor in his father; Lupus in his mother..     Patient does not report Mental Health Diagnosis or Treatment. Patient previously went to therapy in grade school for transition of family dynamics when mom got remarried.     Patient has had a physical exam to rule out medical causes for current symptoms.  Date of last physical exam was within the past year. Symptoms have developed since last physical exam and client was encouraged to follow up with PCP.  . The patient has a [place] Primary Care Provider, who is named Roman Reilly.  Patient reports the following current medical concerns: headaches and stomachaches and no current dental concerns.  Patient denies any issues with pain..   There are not significant appetite / nutritional concerns / weight changes.   Patient does not report a history of head injury / trauma /  cognitive impairment.        Medication Adherence:  Patient reports taking prescriptions as prescribed.      Patient Allergies:  No Known Allergies    Medical History:    Past Medical History:   Diagnosis Date     Environmental allergies      GERD (gastroesophageal reflux disease)          Current Mental Status Exam:   Appearance:  Appropriate    Eye Contact:  Good   Psychomotor:  Normal       Gait / station:  no problem  Attitude / Demeanor: Cooperative   Speech      Rate / Production: Normal/ Responsive      Volume:  Normal  volume      Language:  intact  Mood:   Anxious   Affect:   Appropriate    Thought Content: Clear   Thought Process: Coherent       Associations: No loosening of associations  Insight:   Fair   Judgment:  Intact   Orientation:  All  Attention/concentration: Good    Rating Scales:    PHQ9:    PHQ-9 SCORE 12/18/2021 12/29/2021   PHQ-9 Total Score 17 9       GAD7:    SRINIVAS-7 SCORE 12/18/2021 12/29/2021 12/29/2021   Total Score - - 6 (mild anxiety)   Total Score 8 6 6     CGI:     First:Considering your total clinical experience with this particular patient population, how severe are the patient's symptoms at this time?: 4 (12/29/2021  4:00 PM)      Most recentCompared to the patient's condition at the START of treatment, this patient's condition is: 4 (12/29/2021  4:00 PM)    PROMIS-10 Scores  Global Mental Health Score: (P) 12  Global Physical Health Score: (P) 15   PROMIS TOTAL - SUBSCORES: (P) 27     Substance Use:  Patient did not report a family history of substance use concerns; see medical history section for details.  Patient has not received chemical dependency treatment in the past.  Patient has not ever been to detox.     Patient is not currently receiving any chemical dependency treatment.         Substance History of use Age of first use Date of last use     Pattern and duration of use (include amounts and frequency)   Alcohol currently use   16 11/30/21 REPORTS SUBSTANCE USE: reports  using substance 3 times per week and has 2 drinks at a time.   Patient reports heaviest use was 24-30 years old.   Cannabis   used in the past 14 08/31/21 REPORTS SUBSTANCE USE: N/A     Amphetamines   used in the past   12/29/98 REPORTS SUBSTANCE USE: N/A   Cocaine/crack    never used       REPORTS SUBSTANCE USE: N/A   Hallucinogens never used         REPORTS SUBSTANCE USE: N/A   Inhalants never used         REPORTS SUBSTANCE USE: N/A   Heroin never used         REPORTS SUBSTANCE USE: N/A   Other Opiates never used     REPORTS SUBSTANCE USE: N/A   Benzodiazepine   never used     REPORTS SUBSTANCE USE: N/A   Barbiturates never used     REPORTS SUBSTANCE USE: N/A   Over the counter meds never used     REPORTS SUBSTANCE USE: N/A   Caffeine currently use ?   REPORTS SUBSTANCE USE: reports using substance 2 times per day and has 1 coffee at a time.   Patient reports heaviest use was a couple of months ago.   Nicotine  used in the past 14 12/29/10 REPORTS SUBSTANCE USE: N/A   Other substances not listed above:  Identify:  never used     REPORTS SUBSTANCE USE: N/A     Patient reported the following problems as a result of their substance use: no problems, not applicable.     CAGE- AID:    CAGE-AID Total Score 12/29/2021   Total Score 0   Total Score MyChart 0 (A total score of 2 or greater is considered clinically significant)       Substance Use: No symptoms    Based on the negative CAGE score and clinical interview there  are not indications of drug or alcohol abuse.      Significant Losses / Trauma / Abuse / Neglect Issues:   Patient did not serve in the .  There are indications or report of significant loss, trauma, abuse or neglect issues related to: are no indications and client denies any losses, trauma, abuse, or neglect concerns.   Concerns for possible neglect are not present.     Safety Assessment:   Current Safety Concerns:  [place] Suicide Severity Rating Scale (Lifetime/Recent)  [place] Suicide  Severity Rating (Lifetime/Recent) 12/29/2021   1. Wish to be Dead (Lifetime) No   1. Wish to be Dead (Recent) No   2. Non-Specific Active Suicidal Thoughts (Lifetime) No   2. Non-Specific Active Suicidal Thoughts (Recent) No   3. Active Suicidal Ideation with any Methods (Not Plan) Without Intent to Act (Lifetime) No   3. Active Suicidal Ideation with any Methods (Not Plan) Without Intent to Act (Recent) No   4. Active Suicidal Ideation with Some Intent to Act, Without Specific Plan (Lifetime) No   4. Active Suicidal Ideation with Some Intent to Act, Without Specific Plan (Recent) No   5. Active Suicidal Ideation with Specific Plan and Intent (Lifetime) No   5. Active Suicidal Ideation with Specific Plan and Intent (Recent) No   Most Severe Ideation Rating (Lifetime) NA   Most Severe Ideation Description (Lifetime) n   Frequency (Lifetime) NA   Duration (Lifetime) NA   Controllability (Lifetime) NA   Protective Factors  (Lifetime) NA   Reasons for Ideation (Lifetime) NA   Most Severe Ideation Rating (Past Month) NA   Most Severe Ideation Description (Past Month) n   Frequency (Past Month) NA   Duration (Past Month) NA   Controllability (Past Month) NA   Protective Factors (Past Month) NA   Reasons for Ideation (Past Month) NA   Actual Attempt (Lifetime) No   Actual Attempt (Past 3 Months) No   Has subject engaged in non-suicidal self-injurious behavior? (Lifetime) No   Has subject engaged in non-suicidal self-injurious behavior? (Past 3 Months) No   Interrupted Attempts (Lifetime) No   Interrupted Attempts (Past 3 Months) No   Aborted or Self-Interrupted Attempt (Lifetime) No   Aborted or Self-Interrupted Attempt (Past 3 Months) No   Preparatory Acts or Behavior (Lifetime) No   Preparatory Acts or Behavior (Past 3 Months) No   Most Recent Attempt Actual Lethality Code NA   Most Lethal Attempt Actual Lethality Code NA   Initial/First Attempt Actual Lethality Code NA     Patient denies current homicidal ideation and  behaviors.  Patient denies current self-injurious ideation and behaviors.    Patient denied risk behaviors associated with substance use.  Patient denies any high risk behaviors associated with mental health symptoms.  Patient reports the following current concerns for their personal safety: None.  Patient reports there are not firearms in the house.       History of Safety Concerns:  Patient denied a history of homicidal ideation.     Patient denied a history of personal safety concerns.    Patient denied a history of assaultive behaviors.    Patient denied a history of sexual assault behaviors.     Patient denied a history of risk behaviors associated with substance use.  Patient denies any history of high risk behaviors associated with mental health symptoms.  Patient reports the following protective factors: forward or future oriented thinking; dedication to family or friends; safe and stable environment; regular sleep; regular physical activity; sense of belonging; purpose; secure attachment; abstinence from substances; adherence with prescribed medication; living with other people; daily obligations; structured day; effective problem solving skills; commitment to well being; sense of meaning; positive social skills; healthy fear of risky behaviors or pain; financial stability; strong sense of self worth or esteem; other    Risk Plan:  See Recommendations for Safety and Risk Management Plan    Review of Symptoms per patient report:  Depression: Change in sleep, Lack of interest, Change in energy level, Feelings of hopelessness, Ruminations and Feeling sad, down, or depressed  Rubina:  No Symptoms  Psychosis: No Symptoms  Anxiety: Excessive worry, Nervousness, Physical complaints, such as headaches, stomachaches, muscle tension and Ruminations  Panic:  No symptoms  Post Traumatic Stress Disorder:  No Symptoms   Eating Disorder: No Symptoms  ADD / ADHD:  No symptoms  Conduct Disorder: No symptoms  Autism Spectrum  Disorder: No symptoms  Obsessive Compulsive Disorder: No Symptoms    Patient reports the following compulsive behaviors and treatment history: none reported.      Diagnostic Criteria:   Adjustment Disorder  A. The development of emotional or behavioral symptoms in response to an identifiable stressor(s) occurring within 3 months of the onset of the stressor(s)  B. These symptoms or behaviors are clinically significant, as evidenced by one or both of the following:  C. The stress-related disturbance does not meet criteria for another disorder & is not not an exacerbation of another mental disorder  D. The symptoms do not represent normal bereavement  E. Once the stressor or its consequences have terminated, the symptoms do not persist for more than an additional 6 months       * Adjustment Disorder with Mixed Anxiety and Depressed Mood: The predominant manifestation is a combination of depression and anxiety    Functional Status:  Patient reports the following functional impairments: health maintenance.     WHODAS:   WHODAS 2.0 Total Score 12/29/2021   Total Score 20   Total Score MyChart 20     Nonprogrammatic care:  Patient is requesting basic services to address current mental health concerns.    Clinical Summary:  1. Reason for assessment: Patient has experienced anxiety related to health issues and depression.  2. Psychosocial, Cultural and Contextual Factors: Patient is a 41 year old male who works full time and lives with wife and 2 kids.  3. Principal DSM5 Diagnoses  (Sustained by DSM5 Criteria Listed Above):   Adjustment Disorders  309.28 (F43.23) With mixed anxiety and depressed mood.   4. Other Diagnoses that is relevant to services:   n/a  5. Provisional Diagnosis:  n/a  6. Prognosis: Return to Normal Functioning and Expect Improvement.  7. Likely consequences of symptoms if not treated: patient's symptoms will increase and will need a higher level of care.  8. Client strengths include:  motivated and  support of family, friends and providers .     Recommendations:     1. Plan for Safety and Risk Management:   Recommended that patient call 911 or go to the local ED should there be a change in any of these risk factors..          Report to child / adult protection services was NA.     2. Patient's identified none identified.     3. Initial Treatment will focus on:    Depressed Mood - decrease depression  Anxiety - decrease anxiety.     4. Resources/Service Plan:    services are not indicated.   Modifications to assist communication are not indicated.   Additional disability accommodations are not indicated.      5. Collaboration:   Collaboration / coordination of treatment will be initiated with the following  support professionals: primary care physician.      6.  Referrals:   The following referral(s) will be initiated: n/a. Next Scheduled Appointment: next week.     A Release of Information has been obtained for the following: n/a.    7. CORDELL:    CORDELL:  Discussed the general effects of drugs and alcohol on health and well-being. Provider gave patient printed information about the effects of chemical use on their health and well being. Recommendations:  n/a .     8. Records:   These were reviewed at time of assessment.   Information in this assessment was obtained from the medical record and  provided by patient who is a good historian.    Patient will have open access to their mental health medical record.      Provider Name/ Credentials: Marija Che MS, LADC, LPCC  December 29, 2021

## 2021-12-30 ASSESSMENT — ANXIETY QUESTIONNAIRES
GAD7 TOTAL SCORE: 6
GAD7 TOTAL SCORE: 6

## 2022-01-05 ENCOUNTER — VIRTUAL VISIT (OUTPATIENT)
Dept: PSYCHOLOGY | Facility: CLINIC | Age: 42
End: 2022-01-05
Payer: COMMERCIAL

## 2022-01-05 DIAGNOSIS — F43.23 ADJUSTMENT DISORDER WITH MIXED ANXIETY AND DEPRESSED MOOD: Primary | ICD-10-CM

## 2022-01-05 PROCEDURE — 90791 PSYCH DIAGNOSTIC EVALUATION: CPT | Mod: GT | Performed by: COUNSELOR

## 2022-01-05 NOTE — PROGRESS NOTES
Progress Note    Patient Name: Nahum Chiu  Date: 2022         Service Type: Individual      Session Start Time: 9:00am  Session End Time: 9:48am     Session Length: 38-52 min    Session #: 2    Attendees: Client attended alone    Service Modality:  Video Visit:      Provider verified identity through the following two step process.  Patient provided:  Patient  and Patient address    Telemedicine Visit: The patient's condition can be safely assessed and treated via synchronous audio and visual telemedicine encounter.      Reason for Telemedicine Visit: Services only offered telehealth    Originating Site (Patient Location): Patient's home    Distant Site (Provider Location): Provider Remote Setting- Home Office    Consent:  The patient/guardian has verbally consented to: the potential risks and benefits of telemedicine (video visit) versus in person care; bill my insurance or make self-payment for services provided; and responsibility for payment of non-covered services.     Patient would like the video invitation sent by:  My Chart    Mode of Communication:  Video Conference via [employer]    As the provider I attest to compliance with applicable laws and regulations related to telemedicine.     Treatment Plan Last Reviewed: 2022 next review due 2022  PHQ-9 / SRINIVAS-7 : see epic updates  SRINIVAS-7 SCORE 2021   Total Score - - 6 (mild anxiety)   Total Score 8 6 6         DATA  Interactive Complexity: No  Crisis: No       Progress Since Last Session (Related to Symptoms / Goals / Homework):   Symptoms: No change patient is in initial stages of treatment    Homework: Completed in session      Episode of Care Goals: No improvement - PREPARATION (Decided to change - considering how); Intervened by negotiating a change plan and determining options / strategies for behavior change, identifying triggers, exploring social supports, and working  towards setting a date to begin behavior change     Current / Ongoing Stressors and Concerns:   Patient reports continued worry and anxiety related to feeling a headache he feels daily. The headaches started very minor at the beginning of December. He reports the anxiety is worse in the morning. Hx of father being diagnosed with brain tumor when he was 16 years old. Patient noticed the anxiety related to health starting when he quit smoking in 2001 in his early 20's.     Treatment Objective(s) Addressed in This Session:   Goal- Anxiety: Patient will reduce overall frequency, intensity, and duration of the anxiety so that daily functioning is not impaired.     Objective #A (Client Action)  Patient will describe situations, thoughts, feelings, and actions associated with anxieties and worries, their impact on functioning and attempts to resolve them. Patient will do this 4 out of 7 days of the week.     Objective #B  Patient will use cognitive strategies identified in therapy to challenge negative thought patterns 90% of the time.    Objective #C  Patient will identify and use at least three coping skills and distraction and diversion activities to manage feelings of anxiety. Pt will use these skills at least three times per week.         Intervention:   Therapist met with patient to develop goals and interventions. Therapist utilized person centered therapy to better understand the patient's specific goals for therapeutic treatment. Patient stated  I will know I've met my goal when I know what to do with the anxiety that I feel about health.  Discussed specific interventions for treatment goals surrounding anxiety.  Therapist worked to build rapport with the patient and listened with empathy as the patient spoke in more depth regarding her experience. Therapist worked to normalize the pt's experience and work toward goal centered talk. Together pt and therapist worked on solidifying treatment goals and interventions  for future therapy work.           ASSESSMENT: Current Emotional / Mental Status (status of significant symptoms):   Risk status (Self / Other harm or suicidal ideation)   Patient denies current fears or concerns for personal safety.   Patient denies current or recent suicidal ideation or behaviors.   Patient denies current or recent homicidal ideation or behaviors.   Patient denies current or recent self injurious behavior or ideation.   Patient denies other safety concerns.   Patient reports there has been no change in risk factors since their last session.     Patient reports there has been no change in protective factors since their last session.     Recommended that patient call 911 or go to the local ED should there be a change in any of these risk factors.     Appearance:   Appropriate    Eye Contact:   Good    Psychomotor Behavior: Normal    Attitude:   Cooperative    Orientation:   All   Speech    Rate / Production: Normal     Volume:  Normal    Mood:    Anxious  Normal   Affect:    Appropriate    Thought Content:  Clear    Thought Form:  Coherent  Logical    Insight:    Fair      Medication Review:   No current psychiatric medications prescribed     Medication Compliance:   NA     Changes in Health Issues:   None reported     Chemical Use Review:   Substance Use: Chemical use reviewed, no active concerns identified      Tobacco Use: No current tobacco use.      Diagnosis:  1. Adjustment disorder with mixed anxiety and depressed mood        Collateral Reports Completed:   Not Applicable    PLAN: (Patient Tasks / Therapist Tasks / Other)  1) patient will utilize breathing exercises when feeling anxiety increasing  2) patient will recognize when engaging in worst case scenario, 2 times per week  3) next session scheduled for 2 weeks out         [facility]   1/5/2022                                                            ______________________________________________________________________    Treatment Plan    Patient's Name: Nahum Chiu  YOB: 1980    Date: 1/5/2022    DSM5 Diagnoses: Adjustment Disorders  309.28 (F43.23) With mixed anxiety and depressed mood  Psychosocial / Contextual Factors:  Patient is a 41 year old male who works full time and lives with wife and 2 kids.  WHODAS: 20    Referral / Collaboration:  Referral to another professional/service is not indicated at this time..    Anticipated number of session or this episode of care: 18-24      MeasurableTreatment Goal(s) related to diagnosis / functional impairment(s)  Goal- Anxiety: Patient will reduce overall frequency, intensity, and duration of the anxiety so that daily functioning is not impaired.     I will know I've met my goal when I know what to do with the anxiety that I feel about health.      Objective #A (Client Action)  Patient will describe situations, thoughts, feelings, and actions associated with anxieties and worries, their impact on functioning and attempts to resolve them. Patient will do this 4 out of 7 days of the week.     Intervention(s)  Therapist will provide psychoeducation, behavioral activation, and cognitive restructuring.  Status: New - Date: 1/5/2022    Objective #B  Patient will use cognitive strategies identified in therapy to challenge negative thought patterns 90% of the time.    Intervention(s)  Therapist will provide psychoeducation, behavioral activation, and cognitive restructuring.  Status: New - Date: 1/5/2022    Objective #C  Patient will identify and use at least three coping skills and distraction and diversion activities to manage feelings of anxiety. Pt will use these skills at least three times per week.    Intervention(s)  Therapist will provide psychoeducation, behavioral activation, and cognitive restructuring.  Status: New - Date: 1/5/2022      Patient has reviewed and agreed to the above  plan.      IBRAHIMA DE LA GARZA, [facility]  January 5, 2022

## 2022-01-18 ENCOUNTER — OFFICE VISIT (OUTPATIENT)
Dept: FAMILY MEDICINE | Facility: CLINIC | Age: 42
End: 2022-01-18
Payer: COMMERCIAL

## 2022-01-18 VITALS
TEMPERATURE: 97.9 F | HEART RATE: 66 BPM | WEIGHT: 214.6 LBS | SYSTOLIC BLOOD PRESSURE: 118 MMHG | RESPIRATION RATE: 16 BRPM | OXYGEN SATURATION: 97 % | BODY MASS INDEX: 28.44 KG/M2 | HEIGHT: 73 IN | DIASTOLIC BLOOD PRESSURE: 78 MMHG

## 2022-01-18 DIAGNOSIS — Z01.818 PREOP GENERAL PHYSICAL EXAM: Primary | ICD-10-CM

## 2022-01-18 DIAGNOSIS — J02.9 PHARYNGITIS, UNSPECIFIED ETIOLOGY: ICD-10-CM

## 2022-01-18 LAB
HGB BLD-MCNC: 15.1 G/DL (ref 13.3–17.7)
SARS-COV-2 RNA RESP QL NAA+PROBE: NEGATIVE

## 2022-01-18 PROCEDURE — U0003 INFECTIOUS AGENT DETECTION BY NUCLEIC ACID (DNA OR RNA); SEVERE ACUTE RESPIRATORY SYNDROME CORONAVIRUS 2 (SARS-COV-2) (CORONAVIRUS DISEASE [COVID-19]), AMPLIFIED PROBE TECHNIQUE, MAKING USE OF HIGH THROUGHPUT TECHNOLOGIES AS DESCRIBED BY CMS-2020-01-R: HCPCS | Performed by: PHYSICIAN ASSISTANT

## 2022-01-18 PROCEDURE — 36415 COLL VENOUS BLD VENIPUNCTURE: CPT | Performed by: PHYSICIAN ASSISTANT

## 2022-01-18 PROCEDURE — 85018 HEMOGLOBIN: CPT | Performed by: PHYSICIAN ASSISTANT

## 2022-01-18 PROCEDURE — U0005 INFEC AGEN DETEC AMPLI PROBE: HCPCS | Performed by: PHYSICIAN ASSISTANT

## 2022-01-18 PROCEDURE — 99214 OFFICE O/P EST MOD 30 MIN: CPT | Performed by: PHYSICIAN ASSISTANT

## 2022-01-18 ASSESSMENT — MIFFLIN-ST. JEOR: SCORE: 1914.36

## 2022-01-18 NOTE — PATIENT INSTRUCTIONS
Ok to use pantoprazole and/or tylenol morning of surgery with small sip of water; Avoid all others on your list.     Preparing for Your Surgery  Getting started  A nurse will call you to review your health history and instructions. They will give you an arrival time based on your scheduled surgery time. Please be ready to share:    Your doctor's clinic name and phone number    Your medical, surgical and anesthesia history    A list of allergies and sensitivities    A list of medicines, including herbal treatments and over-the-counter drugs    Whether the patient has a legal guardian (ask how to send us the papers in advance)  Please tell us if you're pregnant--or if there's any chance you might be pregnant. Some surgeries may injure a fetus (unborn baby), so they require a pregnancy test. Surgeries that are safe for a fetus don't always need a test, and you can choose whether to have one.   If you have a child who's having surgery, please ask for a copy of Preparing for Your Child's Surgery.    Preparing for surgery    Within 30 days of surgery: Have a pre-op exam (sometimes called an H&P, or History and Physical). This can be done at a clinic or pre-operative center.  ? If you're having a , you may not need this exam. Talk to your care team.    At your pre-op exam, talk to your care team about all medicines you take. If you need to stop any medicines before surgery, ask when to start taking them again.  ? We do this for your safety. Many medicines can make you bleed too much during surgery. Some change how well surgery (anesthesia) drugs work.    Call your insurance company to let them know you're having surgery. (If you don't have insurance, call [phone].)    Call your clinic if there's any change in your health. This includes signs of a cold or flu (sore throat, runny nose, cough, rash, fever). It also includes a scrape or scratch near the surgery site.    If you have questions on the day of  surgery, call your hospital or surgery center.  COVID testing  You may need to be tested for COVID-19 before having surgery. If so, your surgical team will give you instructions for scheduling this test, separate from your preoperative history and physical.  Eating and drinking guidelines  For your safety: Unless your surgeon tells you otherwise, follow the guidelines below.    Eat and drink as usual until 8 hours before surgery. After that, no food or milk.    Drink clear liquids until 2 hours before surgery. These are liquids you can see through, like water, Gatorade and Propel Water. You may also have black coffee and tea (no cream or milk).    Nothing by mouth within 2 hours of surgery. This includes gum, candy and breath mints.    If you drink alcohol: Stop drinking it the night before surgery.    If your care team tells you to take medicine on the morning of surgery, it's okay to take it with a sip of water.  Preventing infection    Shower or bathe the night before and morning of your surgery. Follow the instructions your clinic gave you. (If no instructions, use regular soap.)    Don't shave or clip hair near your surgery site. We'll remove the hair if needed.    Don't smoke or vape the morning of surgery. You may chew nicotine gum up to 2 hours before surgery. A nicotine patch is okay.  ? Note: Some surgeries require you to completely quit smoking and nicotine. Check with your surgeon.    Your care team will make every effort to keep you safe from infection. We will:  ? Clean our hands often with soap and water (or an alcohol-based hand rub).  ? Clean the skin at your surgery site with a special soap that kills germs.  ? Give you a special gown to keep you warm. (Cold raises the risk of infection.)  ? Wear special hair covers, masks, gowns and gloves during surgery.  ? Give antibiotic medicine, if prescribed. Not all surgeries need antibiotics.  What to bring on the day of surgery    Photo ID and insurance  card    Copy of your health care directive, if you have one    Glasses and hearing aides (bring cases)  ? You can't wear contacts during surgery    Inhaler and eye drops, if you use them (tell us about these when you arrive)    CPAP machine or breathing device, if you use them    A few personal items, if spending the night    If you have . . .  ? A pacemaker, ICD (cardiac defibrillator) or other implant: Bring the ID card.  ? An implanted stimulator: Bring the remote control.  ? A legal guardian: Bring a copy of the certified (court-stamped) guardianship papers.  Please remove any jewelry, including body piercings. Leave jewelry and other valuables at home.  If you're going home the day of surgery    You must have a responsible adult drive you home. They should stay with you overnight as well.    If you don't have someone to stay with you, and you aren't safe to go home alone, we may keep you overnight. Insurance often won't pay for this.  After surgery  If it's hard to control your pain or you need more pain medicine, please call your surgeon's office.  Questions?   If you have any questions for your care team, list them here: _________________________________________________________________________________________________________________________________________________________________________ ____________________________________ ____________________________________ ____________________________________  For informational purposes only. Not to replace the advice of your health care provider. Copyright   2003, 2019 [facility]. All rights reserved. Clinically reviewed by Oly Mcguire MD. [employer] 807113 - REV 07/21.

## 2022-01-18 NOTE — PROGRESS NOTES
[facility]  [address] [address]  Phone: [phone]  Primary Provider: Roman Reilly  Pre-op Performing Provider: DIONNE HERRERA      PREOPERATIVE EVALUATION:  Today's date: 1/18/2022    Nahum Chiu is a 41 year old male who presents for a preoperative evaluation.    Surgical Information:  Surgery/Procedure: Adenoidectomy and Right Maxillary Antrostomy  Surgery Location: [facility]  Surgeon: Moise Ferreira  Surgery Date: 1/20/2022  Time of Surgery: 10:30 AM  Where patient plans to recover: At home with family  Fax number for surgical facility: [facility] at [phone] and [facility] [phone]    Type of Anesthesia Anticipated: General    Assessment & Plan     The proposed surgical procedure is considered LOW risk.    Preop general physical exam  Pharyngitis, unspecified etiology  Ok for planned procedure.   - Asymptomatic COVID-19 Virus (Coronavirus) by PCR; Future  - Hemoglobin; Future  - Asymptomatic COVID-19 Virus (Coronavirus) by PCR  - Hemoglobin        RECOMMENDATION:  APPROVAL GIVEN to proceed with proposed procedure, without further diagnostic evaluation.        Subjective     HPI related to upcoming procedure: Nahum has started to develop more regular upper respiratory symptoms since this fall; culture showing staph and CT with chronic congestion, now with failure of abx treatments. Planned for above procedure     Preop Questions 1/18/2022   1. Have you ever had a heart attack or stroke? No   2. Have you ever had surgery on your heart or blood vessels, such as a stent placement, a coronary artery bypass, or surgery on an artery in your head, neck, heart, or legs? No   3. Do you have chest pain with activity? No   4. Do you have a history of  heart failure? No   5. Do you currently have a cold, bronchitis or symptoms of other infection? YES - simply the ongoing throat irritation for the past few months   6. Do you have a cough,  shortness of breath, or wheezing? No   7. Do you or anyone in your family have previous history of blood clots? No   8. Do you or does anyone in your family have a serious bleeding problem such as prolonged bleeding following surgeries or cuts? No   9. Have you ever had problems with anemia or been told to take iron pills? No   10. Have you had any abnormal blood loss such as black, tarry or bloody stools? No   11. Have you ever had a blood transfusion? No   12. Are you willing to have a blood transfusion if it is medically needed before, during, or after your surgery? Yes   13. Have you or any of your relatives ever had problems with anesthesia? No   14. Do you have sleep apnea, excessive snoring or daytime drowsiness? UNKNOWN   15. Do you have any artifical heart valves or other implanted medical devices like a pacemaker, defibrillator, or continuous glucose monitor? No   16. Do you have artificial joints? No   17. Are you allergic to latex? No       Health Care Directive:  Patient does not have a Health Care Directive or Living Will: Discussed advance care planning with patient; however, patient declined at this time.    Preoperative Review of :   reviewed - no record of controlled substances prescribed.        Review of Systems  CONSTITUTIONAL: NEGATIVE for fever, chills, change in weight  INTEGUMENTARY/SKIN: NEGATIVE for worrisome rashes, moles or lesions  EYES: NEGATIVE for vision changes or irritation  ENT/MOUTH: POSITIVE for pharyngitis  RESP: NEGATIVE for significant cough or SOB  CV: NEGATIVE for chest pain, palpitations or peripheral edema  GI: POSITIVE for dyspepsia  : NEGATIVE for frequency, dysuria, or hematuria  MUSCULOSKELETAL: NEGATIVE for significant arthralgias or myalgia  NEURO: NEGATIVE for weakness, dizziness or paresthesias  ENDOCRINE: NEGATIVE for temperature intolerance, skin/hair changes  HEME: NEGATIVE for bleeding problems  PSYCHIATRIC: POSITIVE formood change    Patient Active  "Problem List    Diagnosis Date Noted     Allergic reaction, initial encounter 2020     Priority: Medium     GERD (gastroesophageal reflux disease) 2014     Priority: Medium      Past Medical History:   Diagnosis Date     Environmental allergies      GERD (gastroesophageal reflux disease)      Past Surgical History:   Procedure Laterality Date     ESOPHAGOSCOPY, GASTROSCOPY, DUODENOSCOPY (EGD), COMBINED N/A 3/26/2020    Procedure: ESOPHAGOGASTRODUODENOSCOPY, WITH BIOPSY;  Surgeon: Kendell Riley MD;  Location:  GI     [facility]       Current Outpatient Medications   Medication Sig Dispense Refill     fexofenadine (ALLEGRA) 180 MG tablet Take 180 mg by mouth daily       hydrOXYzine (ATARAX) 25 MG tablet Take 1 tablet (25 mg) by mouth every 8 hours as needed for anxiety 30 tablet 0     pantoprazole (PROTONIX) 40 MG EC tablet        sucralfate (CARAFATE) 1 GM tablet take 1 tablet by mouth up to 4 times a day. Crush pill and mix with 2-3 tablespoons of water and drink to coat esophagus/stomach.         No Known Allergies     Social History     Tobacco Use     Smoking status: Former Smoker     Packs/day: 1.00     Years: 13.00     Pack years: 13.00     Types: Cigarettes, Cigars, Dip, chew, snus or snuff     Start date: 1996     Quit date: 2009     Years since quittin.9     Smokeless tobacco: Never Used   Substance Use Topics     Alcohol use: Yes     Comment: minimal use     History   Drug Use No         Objective     /78 (BP Location: Right arm, Patient Position: Chair, Cuff Size: Adult Large)   Pulse 66   Temp 97.9  F (36.6  C) (Oral)   Resp 16   Ht 1.842 m (6' 0.5\")   Wt 97.3 kg (214 lb 9.6 oz)   SpO2 97%   BMI 28.70 kg/m      Physical Exam    GENERAL APPEARANCE: healthy, alert and no distress     EYES: EOMI,  PERRL     HENT: ear canals and TM's normal and nose and mouth without ulcers or lesions; erythematous oropharynx     NECK: no adenopathy     RESP: lungs clear to " auscultation - no rales, rhonchi or wheezes     CV: regular rates and rhythm, normal S1 S2, no S3 or S4 and no murmur, click or rub     MS: No peripheral edema      PSYCH: mentation appears normal. and affect normal/bright      Diagnostics:  Recent Results (from the past 24 hour(s))   Hemoglobin    Collection Time: 01/18/22  7:25 AM   Result Value Ref Range    Hemoglobin 15.1 13.3 - 17.7 g/dL     COVID test is pending. Patient will relay results to surgeon.     No EKG required for low risk surgery (cataract, skin procedure, breast biopsy, etc).    Revised Cardiac Risk Index (RCRI):  The patient has the following serious cardiovascular risks for perioperative complications:   - No serious cardiac risks = 0 points     RCRI Interpretation: 0 points: Class I (very low risk - 0.4% complication rate)           Signed Electronically by: Reynaldo Byrd PA-C  Copy of this evaluation report is provided to requesting physician.

## 2022-01-19 ENCOUNTER — VIRTUAL VISIT (OUTPATIENT)
Dept: PSYCHOLOGY | Facility: CLINIC | Age: 42
End: 2022-01-19
Payer: COMMERCIAL

## 2022-01-19 DIAGNOSIS — F43.23 ADJUSTMENT DISORDER WITH MIXED ANXIETY AND DEPRESSED MOOD: Primary | ICD-10-CM

## 2022-01-19 PROCEDURE — 90834 PSYTX W PT 45 MINUTES: CPT | Mod: GT | Performed by: COUNSELOR

## 2022-01-19 NOTE — PROGRESS NOTES
Progress Note    Patient Name: Nahum Chiu  Date: 2022           Service Type: Individual      Session Start Time: 3:00pm  Session End Time: 3:38pm     Session Length: 38-52 min    Session #: 3    Attendees: Client attended alone    Service Modality:  Video Visit:      Provider verified identity through the following two step process.  Patient provided:  Patient  and Patient address    Telemedicine Visit: The patient's condition can be safely assessed and treated via synchronous audio and visual telemedicine encounter.      Reason for Telemedicine Visit: Services only offered telehealth    Originating Site (Patient Location): Patient's home    Distant Site (Provider Location): Provider Remote Setting- Home Office    Consent:  The patient/guardian has verbally consented to: the potential risks and benefits of telemedicine (video visit) versus in person care; bill my insurance or make self-payment for services provided; and responsibility for payment of non-covered services.     Patient would like the video invitation sent by:  My Chart    Mode of Communication:  Video Conference via [employer]well    As the provider I attest to compliance with applicable laws and regulations related to telemedicine.     Treatment Plan Last Reviewed: 2022 next review due 2022  PHQ-9 / SRINIVAS-7 : see epic updates  SRINIVAS-7 SCORE 2021   Total Score - - 6 (mild anxiety)   Total Score 8 6 6         DATA  Interactive Complexity: No  Crisis: No       Progress Since Last Session (Related to Symptoms / Goals / Homework):   Symptoms: Improving patient is working towards goals    Homework: Completed in session      Episode of Care Goals: Minimal progress - ACTION (Actively working towards change); Intervened by reinforcing change plan / affirming steps taken     Current / Ongoing Stressors and Concerns:   Hx of father being diagnosed with brain tumor when he was 16  years old. Patient noticed the anxiety related to health starting when he quit smoking in 2001 in his early 20's.   Patient reported going to an eye doctor for feelings of tired eyes, and they found abrasions on his cornea which is leading to dry eyes throughout the day. He has since started to feel better in this area and experienced an overall decrease in anxious feelings. Reports continued headaches occurring less and has surgery on sinuses tomorrow.      Treatment Objective(s) Addressed in This Session:   Goal- Anxiety: Patient will reduce overall frequency, intensity, and duration of the anxiety so that daily functioning is not impaired.     Objective #A (Client Action)  Patient will describe situations, thoughts, feelings, and actions associated with anxieties and worries, their impact on functioning and attempts to resolve them. Patient will do this 4 out of 7 days of the week.   - discussed situations the increase anxiety  Objective #B  Patient will use cognitive strategies identified in therapy to challenge negative thought patterns 90% of the time.  - discussed cognitive distortions  Objective #C  Patient will identify and use at least three coping skills and distraction and diversion activities to manage feelings of anxiety. Pt will use these skills at least three times per week.  - discussed breathing exercises       Intervention:   Therapist met with patient to review goals and interventions. Therapist utilized reflective listening as patient gave brief reflection of week. Patient reports a decrease in anxiety and the ability to no longer ruminate on thoughts. He finds himself being able to better control the thought process that increases anxiety.    Discussed concept of cognitive distortions today in session.  Explored connection between automatic thinking and cognitive distortions.  Discussed common examples (e.g., catastrophizing, mindreading, dichotomous thinking) of cognitive distortions in  "session.  Specifically talked about the patient's tendency to go to worst case scenario.  Discussed connection between cognitive distortions and depression, anxiety, and stress.  Introduced concept of challenging cognitive distortions by asking \"is this reaction logical?\" , \"is this reaction helpful?\" , \"am I overreacting?\" , and \"how can I respond to make this situation better?\". Discussed the importance of adding more facts to the thought to create a more balanced way of thinking.           ASSESSMENT: Current Emotional / Mental Status (status of significant symptoms):   Risk status (Self / Other harm or suicidal ideation)   Patient denies current fears or concerns for personal safety.   Patient denies current or recent suicidal ideation or behaviors.   Patient denies current or recent homicidal ideation or behaviors.   Patient denies current or recent self injurious behavior or ideation.   Patient denies other safety concerns.   Patient reports there has been no change in risk factors since their last session.     Patient reports there has been no change in protective factors since their last session.     Recommended that patient call 911 or go to the local ED should there be a change in any of these risk factors.     Appearance:   Appropriate    Eye Contact:   Good    Psychomotor Behavior: Normal    Attitude:   Cooperative    Orientation:   All   Speech    Rate / Production: Normal     Volume:  Normal    Mood:    Anxious  Normal   Affect:    Appropriate    Thought Content:  Clear    Thought Form:  Coherent  Logical    Insight:    Fair      Medication Review:   No current psychiatric medications prescribed     Medication Compliance:   NA     Changes in Health Issues:   None reported     Chemical Use Review:   Substance Use: Chemical use reviewed, no active concerns identified      Tobacco Use: No current tobacco use.      Diagnosis:  1. Adjustment disorder with mixed anxiety and depressed mood        Collateral " Reports Completed:   Not Applicable    PLAN: (Patient Tasks / Therapist Tasks / Other)  1) patient will utilize breathing exercises when feeling anxiety increasing  2) patient will reframe thoughts 3 times per week  3) next session scheduled for 2 weeks out         IBRAHIMA DE LA GARZA, [facility]   1/19/2022                                                             ______________________________________________________________________    Treatment Plan    Patient's Name: Nahum Chiu  YOB: 1980    Date: 1/5/2022    DSM5 Diagnoses: Adjustment Disorders  309.28 (F43.23) With mixed anxiety and depressed mood  Psychosocial / Contextual Factors:  Patient is a 41 year old male who works full time and lives with wife and 2 kids.  WHODAS: 20    Referral / Collaboration:  Referral to another professional/service is not indicated at this time..    Anticipated number of session or this episode of care: 18-24      MeasurableTreatment Goal(s) related to diagnosis / functional impairment(s)  Goal- Anxiety: Patient will reduce overall frequency, intensity, and duration of the anxiety so that daily functioning is not impaired.     I will know I've met my goal when I know what to do with the anxiety that I feel about health.      Objective #A (Client Action)  Patient will describe situations, thoughts, feelings, and actions associated with anxieties and worries, their impact on functioning and attempts to resolve them. Patient will do this 4 out of 7 days of the week.     Intervention(s)  Therapist will provide psychoeducation, behavioral activation, and cognitive restructuring.  Status: New - Date: 1/5/2022    Objective #B  Patient will use cognitive strategies identified in therapy to challenge negative thought patterns 90% of the time.    Intervention(s)  Therapist will provide psychoeducation, behavioral activation, and cognitive restructuring.  Status: New - Date: 1/5/2022    Objective #C  Patient will identify and  use at least three coping skills and distraction and diversion activities to manage feelings of anxiety. Pt will use these skills at least three times per week.    Intervention(s)  Therapist will provide psychoeducation, behavioral activation, and cognitive restructuring.  Status: New - Date: 1/5/2022      Patient has reviewed and agreed to the above plan.      IBRAHIMA DE LA GARZA, [facility]  January 5, 2022

## 2022-02-02 ENCOUNTER — VIRTUAL VISIT (OUTPATIENT)
Dept: PSYCHOLOGY | Facility: CLINIC | Age: 42
End: 2022-02-02
Payer: COMMERCIAL

## 2022-02-02 DIAGNOSIS — F43.23 ADJUSTMENT DISORDER WITH MIXED ANXIETY AND DEPRESSED MOOD: Primary | ICD-10-CM

## 2022-02-02 PROCEDURE — 90834 PSYTX W PT 45 MINUTES: CPT | Mod: GT | Performed by: COUNSELOR

## 2022-02-02 NOTE — PROGRESS NOTES
Progress Note    Patient Name: Nahum Chiu  Date: 2022           Service Type: Individual      Session Start Time: 3:00pm  Session End Time: 3:45pm     Session Length: 38-52 min    Session #: 4    Attendees: Client attended alone    Service Modality:  Video Visit:      Provider verified identity through the following two step process.  Patient provided:  Patient  and Patient address    Telemedicine Visit: The patient's condition can be safely assessed and treated via synchronous audio and visual telemedicine encounter.      Reason for Telemedicine Visit: Services only offered telehealth    Originating Site (Patient Location): Patient's home    Distant Site (Provider Location): Provider Remote Setting- Home Office    Consent:  The patient/guardian has verbally consented to: the potential risks and benefits of telemedicine (video visit) versus in person care; bill my insurance or make self-payment for services provided; and responsibility for payment of non-covered services.     Patient would like the video invitation sent by:  My Chart    Mode of Communication:  Video Conference via Amwell    As the provider I attest to compliance with applicable laws and regulations related to telemedicine.     Treatment Plan Last Reviewed: 2022 next review due 2022  PHQ-9 / SRINIVAS-7 : see epic updates  SRINIVAS-7 SCORE 2021   Total Score - - 6 (mild anxiety)   Total Score 8 6 6         DATA  Interactive Complexity: No  Crisis: No       Progress Since Last Session (Related to Symptoms / Goals / Homework):   Symptoms: Improving patient is working towards goals    Homework: Completed in session      Episode of Care Goals: Satisfactory progress - ACTION (Actively working towards change); Intervened by reinforcing change plan / affirming steps taken     Current / Ongoing Stressors and Concerns:   Hx of father being diagnosed with brain tumor when he was 16  years old. Patient noticed the anxiety related to health starting when he quit smoking in 2001 in his early 20's.   Patient reported continued decrease of anxiety with few moments where he felt anxious about his current headaches or surgery on sinuses.      Treatment Objective(s) Addressed in This Session:   Goal- Anxiety: Patient will reduce overall frequency, intensity, and duration of the anxiety so that daily functioning is not impaired.     Objective #A (Client Action)  Patient will describe situations, thoughts, feelings, and actions associated with anxieties and worries, their impact on functioning and attempts to resolve them. Patient will do this 4 out of 7 days of the week.   - discussed situations the increase anxiety  Objective #B  Patient will use cognitive strategies identified in therapy to challenge negative thought patterns 90% of the time.  - discussed cognitive distortions  Objective #C  Patient will identify and use at least three coping skills and distraction and diversion activities to manage feelings of anxiety. Pt will use these skills at least three times per week.  - discussed breathing exercises       Intervention:   Therapist met with patient to review goals and interventions. Therapist utilized reflective listening as patient gave brief reflection of week. Patient reports a decrease in anxiety and the ability to no longer ruminate on thoughts.   Utilized CBT modality to complete a daily mood journal. Worked to increase pt's awareness of negative thought processes engaged in that spur on the experience of distressing emotions. Took the example of having unexplained headaches. Discussed the distressing emotions experienced, cognitive distortions engaged in, and worked to reframe the negative thought patterns.       ASSESSMENT: Current Emotional / Mental Status (status of significant symptoms):   Risk status (Self / Other harm or suicidal ideation)   Patient denies current fears or concerns  for personal safety.   Patient denies current or recent suicidal ideation or behaviors.   Patient denies current or recent homicidal ideation or behaviors.   Patient denies current or recent self injurious behavior or ideation.   Patient denies other safety concerns.   Patient reports there has been no change in risk factors since their last session.     Patient reports there has been no change in protective factors since their last session.     Recommended that patient call 911 or go to the local ED should there be a change in any of these risk factors.     Appearance:   Appropriate    Eye Contact:   Good    Psychomotor Behavior: Normal    Attitude:   Cooperative    Orientation:   All   Speech    Rate / Production: Normal     Volume:  Normal    Mood:    Normal   Affect:    Appropriate    Thought Content:  Clear    Thought Form:  Coherent  Logical    Insight:    Fair      Medication Review:   No current psychiatric medications prescribed     Medication Compliance:   NA     Changes in Health Issues:   None reported     Chemical Use Review:   Substance Use: Chemical use reviewed, no active concerns identified      Tobacco Use: No current tobacco use.      Diagnosis:  1. Adjustment disorder with mixed anxiety and depressed mood        Collateral Reports Completed:   Not Applicable    PLAN: (Patient Tasks / Therapist Tasks / Other)  1) patient will utilize breathing exercises when feeling anxiety increasing  2) patient will utilize mood journal 2 times per week  3) next session scheduled for 1 month out        IBRAHIMA DE LA GARZA [facility]   2/2/2022                                                               ______________________________________________________________________    Treatment Plan    Patient's Name: Nahum Chiu  YOB: 1980    Date: 1/5/2022    DSM5 Diagnoses: Adjustment Disorders  309.28 (F43.23) With mixed anxiety and depressed mood  Psychosocial / Contextual Factors:  Patient is a 41 year  old male who works full time and lives with wife and 2 kids.  WHODAS: 20    Referral / Collaboration:  Referral to another professional/service is not indicated at this time..    Anticipated number of session or this episode of care: 18-24      MeasurableTreatment Goal(s) related to diagnosis / functional impairment(s)  Goal- Anxiety: Patient will reduce overall frequency, intensity, and duration of the anxiety so that daily functioning is not impaired.     I will know I've met my goal when I know what to do with the anxiety that I feel about health.      Objective #A (Client Action)  Patient will describe situations, thoughts, feelings, and actions associated with anxieties and worries, their impact on functioning and attempts to resolve them. Patient will do this 4 out of 7 days of the week.     Intervention(s)  Therapist will provide psychoeducation, behavioral activation, and cognitive restructuring.  Status: New - Date: 1/5/2022    Objective #B  Patient will use cognitive strategies identified in therapy to challenge negative thought patterns 90% of the time.    Intervention(s)  Therapist will provide psychoeducation, behavioral activation, and cognitive restructuring.  Status: New - Date: 1/5/2022    Objective #C  Patient will identify and use at least three coping skills and distraction and diversion activities to manage feelings of anxiety. Pt will use these skills at least three times per week.    Intervention(s)  Therapist will provide psychoeducation, behavioral activation, and cognitive restructuring.  Status: New - Date: 1/5/2022      Patient has reviewed and agreed to the above plan.      IBRAHIMA DE LA GARZA, [facility]  January 5, 2022

## 2022-06-06 ENCOUNTER — OFFICE VISIT (OUTPATIENT)
Dept: FAMILY MEDICINE | Facility: CLINIC | Age: 42
End: 2022-06-06
Payer: COMMERCIAL

## 2022-06-06 VITALS
DIASTOLIC BLOOD PRESSURE: 78 MMHG | SYSTOLIC BLOOD PRESSURE: 116 MMHG | WEIGHT: 213.6 LBS | HEIGHT: 73 IN | BODY MASS INDEX: 28.31 KG/M2 | TEMPERATURE: 98.4 F | HEART RATE: 70 BPM | OXYGEN SATURATION: 96 % | RESPIRATION RATE: 14 BRPM

## 2022-06-06 DIAGNOSIS — Z11.59 NEED FOR HEPATITIS C SCREENING TEST: Primary | ICD-10-CM

## 2022-06-06 DIAGNOSIS — R41.3 MEMORY LOSS: ICD-10-CM

## 2022-06-06 LAB
ALBUMIN SERPL-MCNC: 4.1 G/DL (ref 3.4–5)
ALP SERPL-CCNC: 41 U/L (ref 40–150)
ALT SERPL W P-5'-P-CCNC: 22 U/L (ref 0–70)
ANION GAP SERPL CALCULATED.3IONS-SCNC: 5 MMOL/L (ref 3–14)
AST SERPL W P-5'-P-CCNC: 11 U/L (ref 0–45)
BILIRUB SERPL-MCNC: 1 MG/DL (ref 0.2–1.3)
BUN SERPL-MCNC: 15 MG/DL (ref 7–30)
CALCIUM SERPL-MCNC: 9.3 MG/DL (ref 8.5–10.1)
CHLORIDE BLD-SCNC: 108 MMOL/L (ref 94–109)
CO2 SERPL-SCNC: 25 MMOL/L (ref 20–32)
CREAT SERPL-MCNC: 0.93 MG/DL (ref 0.66–1.25)
ERYTHROCYTE [DISTWIDTH] IN BLOOD BY AUTOMATED COUNT: 12.8 % (ref 10–15)
GFR SERPL CREATININE-BSD FRML MDRD: >90 ML/MIN/1.73M2
GLUCOSE BLD-MCNC: 101 MG/DL (ref 70–99)
HCT VFR BLD AUTO: 47.1 % (ref 40–53)
HCV AB SERPL QL IA: NONREACTIVE
HGB BLD-MCNC: 16.1 G/DL (ref 13.3–17.7)
MCH RBC QN AUTO: 31.2 PG (ref 26.5–33)
MCHC RBC AUTO-ENTMCNC: 34.2 G/DL (ref 31.5–36.5)
MCV RBC AUTO: 91 FL (ref 78–100)
PLATELET # BLD AUTO: 210 10E3/UL (ref 150–450)
POTASSIUM BLD-SCNC: 4 MMOL/L (ref 3.4–5.3)
PROT SERPL-MCNC: 7.3 G/DL (ref 6.8–8.8)
RBC # BLD AUTO: 5.16 10E6/UL (ref 4.4–5.9)
SODIUM SERPL-SCNC: 138 MMOL/L (ref 133–144)
TSH SERPL DL<=0.005 MIU/L-ACNC: 1.78 MU/L (ref 0.4–4)
VIT B12 SERPL-MCNC: 545 PG/ML (ref 193–986)
WBC # BLD AUTO: 4 10E3/UL (ref 4–11)

## 2022-06-06 PROCEDURE — 80053 COMPREHEN METABOLIC PANEL: CPT | Performed by: FAMILY MEDICINE

## 2022-06-06 PROCEDURE — 85027 COMPLETE CBC AUTOMATED: CPT | Performed by: FAMILY MEDICINE

## 2022-06-06 PROCEDURE — 36415 COLL VENOUS BLD VENIPUNCTURE: CPT | Performed by: FAMILY MEDICINE

## 2022-06-06 PROCEDURE — 84443 ASSAY THYROID STIM HORMONE: CPT | Performed by: FAMILY MEDICINE

## 2022-06-06 PROCEDURE — 99214 OFFICE O/P EST MOD 30 MIN: CPT | Performed by: FAMILY MEDICINE

## 2022-06-06 PROCEDURE — 82607 VITAMIN B-12: CPT | Performed by: FAMILY MEDICINE

## 2022-06-06 PROCEDURE — 86803 HEPATITIS C AB TEST: CPT | Performed by: FAMILY MEDICINE

## 2022-06-06 RX ORDER — SUCRALFATE 1 G/1
TABLET ORAL
COMMUNITY
Start: 2022-06-06 | End: 2023-07-06

## 2022-06-06 RX ORDER — FLUTICASONE PROPIONATE 50 MCG
SPRAY, SUSPENSION (ML) NASAL
COMMUNITY
Start: 2022-01-31 | End: 2022-06-14

## 2022-06-06 RX ORDER — OXYCODONE HYDROCHLORIDE 5 MG/1
TABLET ORAL
COMMUNITY
Start: 2022-01-20 | End: 2022-06-06

## 2022-06-06 RX ORDER — METHYLPREDNISOLONE 4 MG
TABLET, DOSE PACK ORAL SEE ADMIN INSTRUCTIONS
COMMUNITY
Start: 2022-02-04 | End: 2022-06-06

## 2022-06-06 NOTE — PROGRESS NOTES
"  Assessment & Plan     Need for hepatitis C screening test    - Hepatitis C Screen Reflex to HCV RNA Quant and Genotype    Memory loss  Could be minor or anxiety but will check labs and refer to neuro since he would need more extensive memory testing at this time to be sure    - REVIEW OF HEALTH MAINTENANCE PROTOCOL ORDERS  - Comprehensive metabolic panel (BMP + Alb, Alk Phos, ALT, AST, Total. Bili, TP)  - CBC with platelets  - Vitamin B12  - TSH with free T4 reflex  - Adult Neurology  Referral        25 minutes spent on the date of the encounter doing chart review, review of test results, interpretation of tests, patient visit and documentation        BMI:   Estimated body mass index is 28.57 kg/m  as calculated from the following:    Height as of this encounter: 1.842 m (6' 0.5\").    Weight as of this encounter: 96.9 kg (213 lb 9.6 oz).       See Patient Instructions    No follow-ups on file.    Chelsie Garcia MD  [facility]    José Miguel Sidhu is a 41 year old who presents for the following health issues - he has had 2-3 weeks of memory issues.   He has trouble finding words.   He cannot remember names he should.   Sometimes he has a hard time comprehending what he is reading.   He is worried about it as his grandmother and great great grandmother had dementia.   He is not having numbness or tingling or focal weakness.   There was no trauma or incident 2-3 weeks ago.   He had trouble with anxiety reaction last winter but that is better.   He is not on anything for it.       History of Present Illness       Reason for visit:  I have been having issues with my memory as of the last few weeks. Simple memories like words, names, substituting one word for another on accident. This has been causing my anxiety as well.    He eats 2-3 servings of fruits and vegetables daily.He consumes 1 sweetened beverage(s) daily.He exercises with enough effort to increase his heart rate 10 to 19 " "minutes per day.  He exercises with enough effort to increase his heart rate 4 days per week.   He is taking medications regularly.       Past Medical History:   Diagnosis Date     Anxiety 2021    on medication in 20's but not for a long time - seeing therapist now     Environmental allergies      GERD (gastroesophageal reflux disease)        Past Surgical History:   Procedure Laterality Date     ADENOIDECTOMY  2022     ESOPHAGOSCOPY, GASTROSCOPY, DUODENOSCOPY (EGD), COMBINED N/A 2020    Procedure: ESOPHAGOGASTRODUODENOSCOPY, WITH BIOPSY;  Surgeon: Kendell Riley MD;  Location:  GI     HERNIA REPAIR       SINUS SURGERY  2022       MEDICATIONS:  Current Outpatient Medications   Medication     sucralfate (CARAFATE) 1 GM tablet     fexofenadine (ALLEGRA) 180 MG tablet     fluticasone (FLONASE) 50 MCG/ACT nasal spray     pantoprazole (PROTONIX) 40 MG EC tablet     No current facility-administered medications for this visit.       SOCIAL HISTORY:  Social History     Tobacco Use     Smoking status: Former Smoker     Packs/day: 1.00     Years: 13.00     Pack years: 13.00     Types: Cigarettes, Cigars, Dip, chew, snus or snuff     Start date: 1996     Quit date: 2009     Years since quittin.3     Smokeless tobacco: Never Used   Substance Use Topics     Alcohol use: Yes     Comment: minimal use       Family History   Problem Relation Age of Onset     Lupus Mother      Brain Tumor Father         benign brain tumor     Alzheimer Disease Maternal Grandmother      Diabetes No family hx of        Review of Systems   Constitutional, HEENT, cardiovascular, pulmonary, gi and gu systems are negative, except as otherwise noted.      Objective    /78 (BP Location: Right arm, Patient Position: Sitting, Cuff Size: Adult Regular)   Pulse 70   Temp 98.4  F (36.9  C) (Oral)   Resp 14   Ht 1.842 m (6' 0.5\")   Wt 96.9 kg (213 lb 9.6 oz)   SpO2 96%   BMI 28.57 kg/m    Body mass index is 28.57 " kg/m .  Physical Exam   GENERAL: healthy, alert and no distress  EYES: Eyes grossly normal to inspection, PERRL and conjunctivae and sclerae normal  HENT: ear canals and TM's normal, nose and mouth without ulcers or lesions  NECK: no adenopathy, no asymmetry, masses, or scars and thyroid normal to palpation  RESP: lungs clear to auscultation - no rales, rhonchi or wheezes  CV: regular rate and rhythm, normal S1 S2, no S3 or S4, no murmur, click or rub, no peripheral edema and peripheral pulses strong  MS: no gross musculoskeletal defects noted, no edema  SKIN: no suspicious lesions or rashes  NEURO: Normal strength and tone, mentation intact and speech normal  PSYCH: mentation appears normal, affect normal/bright  LYMPH: no cervical, supraclavicular, axillary, or inguinal adenopathy  MINI mental passed all

## 2022-06-07 ENCOUNTER — VIRTUAL VISIT (OUTPATIENT)
Dept: PSYCHOLOGY | Facility: CLINIC | Age: 42
End: 2022-06-07
Payer: COMMERCIAL

## 2022-06-07 DIAGNOSIS — F41.1 GENERALIZED ANXIETY DISORDER: Primary | ICD-10-CM

## 2022-06-07 PROCEDURE — 90834 PSYTX W PT 45 MINUTES: CPT | Mod: GT | Performed by: COUNSELOR

## 2022-06-07 ASSESSMENT — ANXIETY QUESTIONNAIRES
GAD7 TOTAL SCORE: 11
GAD7 TOTAL SCORE: 11
2. NOT BEING ABLE TO STOP OR CONTROL WORRYING: MORE THAN HALF THE DAYS
1. FEELING NERVOUS, ANXIOUS, OR ON EDGE: MORE THAN HALF THE DAYS
6. BECOMING EASILY ANNOYED OR IRRITABLE: SEVERAL DAYS
7. FEELING AFRAID AS IF SOMETHING AWFUL MIGHT HAPPEN: MORE THAN HALF THE DAYS
3. WORRYING TOO MUCH ABOUT DIFFERENT THINGS: MORE THAN HALF THE DAYS
5. BEING SO RESTLESS THAT IT IS HARD TO SIT STILL: SEVERAL DAYS
4. TROUBLE RELAXING: SEVERAL DAYS

## 2022-06-07 NOTE — PROGRESS NOTES
[facility]                                     Progress Note    Patient Name: Nahum Chiu  Date: 2022         Service Type: Individual      Session Start Time: 10:00am  Session End Time:  10:50am     Session Length: 38-52 min    Session #:  5    Attendees: Client attended alone    Service Modality:  Video Visit:      Provider verified identity through the following two step process.  Patient provided:  Patient  and Patient address    Telemedicine Visit: The patient's condition can be safely assessed and treated via synchronous audio and visual telemedicine encounter.      Reason for Telemedicine Visit: Services only offered telehealth    Originating Site (Patient Location): Patient's place of employment    Distant Site (Provider Location): Provider Remote Setting- Home Office    Consent:  The patient/guardian has verbally consented to: the potential risks and benefits of telemedicine (video visit) versus in person care; bill my insurance or make self-payment for services provided; and responsibility for payment of non-covered services.     Patient would like the video invitation sent by:  My Chart    Mode of Communication:  Video Conference via Amwell    As the provider I attest to compliance with applicable laws and regulations related to telemedicine.    DATA  Interactive Complexity: No  Crisis: No        Progress Since Last Session (Related to Symptoms / Goals / Homework):   Symptoms: Worsening due to medical issues that have recently occured    Homework: Completed in session      Episode of Care Goals: Achieved / completed to satisfaction - RELAPSE (Returned to unhealthy behavior); Intervened by reassessing readiness to change and identifying appropriate stage.  Identified reasons for relapse, successes, and change talk     Current / Ongoing Stressors and Concerns:   Pt reports having issues with memory, forgetting names of people, forgetting words, forgetting that he had a  niece, etc. Reports overall difficulty thinking. He also reports an increase in stomach issues. This all began about mid-May of this year.      Treatment Objective(s) Addressed in This Session:   Goal- Anxiety: Patient will reduce overall frequency, intensity, and duration of the anxiety so that daily functioning is not impaired.  Objective #A   Patient will describe situations, thoughts, feelings, and actions associated with anxieties and worries, their impact on functioning and attempts to resolve them. Patient will do this 4 out of 7 days of the week.     Objective #B  Patient will use cognitive strategies identified in therapy to challenge negative thought patterns 90% of the time.    Objective #C  Patient will identify and use at least three coping skills and distraction and diversion activities to manage feelings of anxiety. Pt will use these skills at least three times per week.       Intervention:   Therapist met with patient to review goals and interventions. Therapist utilized reflective listening as patient gave brief reflection of the week. Patient processed increase of anxiety.    Utilized CBT modality to highlight cognitive distortions and worked to reframe thought process in order to decrease felt anxiety about health related issues.     Assessments completed prior to visit:  The following assessments were completed by patient for this visit:    PHQ9:   PHQ-9 SCORE 12/18/2021 12/29/2021 2/11/2022 2/11/2022   PHQ-9 Total Score MyChart - - - 3 (Minimal depression)   PHQ-9 Total Score 17 9 3 3     GAD7:   SRINIVAS-7 SCORE 12/18/2021 12/29/2021 12/29/2021 6/7/2022   Total Score - - 6 (mild anxiety) -   Total Score 8 6 6 11         ASSESSMENT: Current Emotional / Mental Status (status of significant symptoms):   Risk status (Self / Other harm or suicidal ideation)   Patient denies current fears or concerns for personal safety.   Patient denies current or recent suicidal ideation or behaviors.   Patient denies  current or recent homicidal ideation or behaviors.   Patient denies current or recent self injurious behavior or ideation.   Patient denies other safety concerns.   Patient reports there has been no change in risk factors since their last session.     Patient reports there has been no change in protective factors since their last session.     Recommended that patient call 911 or go to the local ED should there be a change in any of these risk factors.     Appearance:   Appropriate    Eye Contact:   Good    Psychomotor Behavior: Normal    Attitude:   Cooperative    Orientation:   All   Speech    Rate / Production: Normal     Volume:  Normal    Mood:    Anxious    Affect:    Appropriate    Thought Content:  Clear    Thought Form:  Coherent  Logical    Insight:    Good      Medication Review:   No current psychiatric medications prescribed     Medication Compliance:   NA     Changes in Health Issues:   None reported     Chemical Use Review:   Substance Use: Chemical use reviewed, no active concerns identified      Tobacco Use: No current tobacco use.      Diagnosis:  1. Generalized anxiety disorder        Collateral Reports Completed:   Not Applicable    PLAN: (Patient Tasks / Therapist Tasks / Other)  1) Pt will utilize head space lillian for meditation, 3 times per week  2) Pt will start morning running routine, 3 times per week  3) Next session scheduled for 2 wks out        IBRAHIMA DE LA GARZA [facility]                                                         ______________________________________________________________________    Individual Treatment Plan    Patient's Name: Nahum Chiu  YOB: 1980    Date of Creation: 1/5/22  Date Treatment Plan Last Reviewed/Revised: 6/7/2022; next review due 9/7/22    DSM5 Diagnoses: 300.02 (F41.1) Generalized Anxiety Disorder  Psychosocial / Contextual Factors: Patient is a 41 year old male who works full time and lives with wife and 2 kids.    PROMIS (reviewed every 90  days): assigned for next session    Referral / Collaboration:  Referral to another professional/service is not indicated at this time..    Anticipated number of session for this episode of care: 18-24  Anticipation frequency of session: Every other week  Anticipated Duration of each session: 38-52 minutes  Treatment plan will be reviewed in 90 days or when goals have been changed.       MeasurableTreatment Goal(s) related to diagnosis / functional impairment(s)  Goal- Anxiety: Patient will reduce overall frequency, intensity, and duration of the anxiety so that daily functioning is not impaired.     I will know I've met my goal when I know what to do with the anxiety that I feel about health.      Objective #A (Client Action)  Patient will describe situations, thoughts, feelings, and actions associated with anxieties and worries, their impact on functioning and attempts to resolve them. Patient will do this 4 out of 7 days of the week.     Intervention(s)  Therapist will provide psychoeducation, behavioral activation, and cognitive restructuring.  Status: Continued - Date: 1/5/2022; 6/7/2022      Objective #B  Patient will use cognitive strategies identified in therapy to challenge negative thought patterns 90% of the time.    Intervention(s)  Therapist will provide psychoeducation, behavioral activation, and cognitive restructuring.  Status: Continued - Date: 1/5/2022; 6/7/2022    Objective #C  Patient will identify and use at least three coping skills and distraction and diversion activities to manage feelings of anxiety. Pt will use these skills at least three times per week.    Intervention(s)  Therapist will provide psychoeducation, behavioral activation, and cognitive restructuring.  Status: Continued - Date: 1/5/2022; 6/7/2022      Patient has reviewed and agreed to the above plan.      IBRAHIMA DE LA GARZA [facility]  1/5/22; 6/7/2022

## 2022-06-13 ENCOUNTER — E-VISIT (OUTPATIENT)
Dept: FAMILY MEDICINE | Facility: CLINIC | Age: 42
End: 2022-06-13
Payer: COMMERCIAL

## 2022-06-13 DIAGNOSIS — R41.3 MEMORY CHANGES: Primary | ICD-10-CM

## 2022-06-13 PROCEDURE — 99422 OL DIG E/M SVC 11-20 MIN: CPT | Performed by: FAMILY MEDICINE

## 2022-06-14 ENCOUNTER — MYC MEDICAL ADVICE (OUTPATIENT)
Dept: FAMILY MEDICINE | Facility: CLINIC | Age: 42
End: 2022-06-14
Payer: COMMERCIAL

## 2022-06-14 NOTE — PATIENT INSTRUCTIONS
I sent the following comment to you via Popego message also.    I reviewed your E-visit comments, and your last visit note from earlier this month concerning memory issues at the [facility].  I reviewed your lab studies from this visit, which were reassuring.  I ordered an MRI study to be done, would someone should be calling you about later this week.  This report can be used by the neurologist you are scheduled to visit with next month.  If you have any focal weakness, focal change in sensation, or worsening headache discomfort, I think being seen in person is a good idea.

## 2022-06-15 ENCOUNTER — PATIENT OUTREACH (OUTPATIENT)
Dept: CARE COORDINATION | Facility: CLINIC | Age: 42
End: 2022-06-15
Payer: COMMERCIAL

## 2022-06-15 NOTE — LETTER
[facility] CARE COORDINATION  [facility]  Anjelica 15, 2022    Nahum Chiu  [address] [address]      Dear Nahum,    I am a clinic community health worker who works with Roman Reilly MD with the [facility]. I wanted to thank you for spending the time to talk with me.  Below is a description of clinic care coordination and how I can further assist you.       The clinic care coordination team is made up of a registered nurse, , financial resource worker and community health worker who understand the health care system. The goal of clinic care coordination is to help you manage your health and improve access to the health care system. Our team works alongside your provider to assist you in determining your health and social needs. We can help you obtain health care and community resources, providing you with necessary information and education. We can work with you through any barriers and develop a care plan that helps coordinate and strengthen the communication between you and your care team.    Please feel free to contact me with any questions or concerns regarding care coordination and what we can offer.      We are focused on providing you with the highest-quality healthcare experience possible.    Sincerely,     VIJAYA Ruth. Public Health  Community Health Worker  [facility]  Clinic Care Coordination  [phone]

## 2022-06-15 NOTE — PROGRESS NOTES
Clinic Care Coordination Contact  Community Health Worker Initial Outreach    CHW Initial Information Gathering:  Referral Source: PCP  Current living arrangement:: Not Assessed  Community Resources: None  No PCP office visit in Past Year: No  Transportation means:: Regular car     Reason for Referral: Utilization of Services Concern     Clinical Staff have discussed the Care Coordination Referral with the patient and/or caregiver: Yes       Notes   CHW: Just needs to be connected with [facility] to scheudle MRI. Patient can call [phone] and request to be transferred to [facility].         Patient accepts CC: No, Patient declined Care Coordination. Patient will be sent Care Coordination introduction letter for future reference.     6-15, CHW:    Writer was able to connect with the patient and introduce self/care coordination.    Writer provided the patient the P. Number to connect with [facility] to schedule their MRI.    Writer inquired if the patient had any other questions or concerns; however they did not.    Writer encouraged the patient to reach out in the future if anything changes; patient agreeable with plan.      VIJAYA Ruth. Public Health  Community Health Worker  [facility]  Clinic Care Coordination  [phone]

## 2022-06-20 ENCOUNTER — HOSPITAL ENCOUNTER (OUTPATIENT)
Dept: MRI IMAGING | Facility: CLINIC | Age: 42
Discharge: HOME | End: 2022-06-20
Attending: FAMILY MEDICINE | Admitting: FAMILY MEDICINE
Payer: COMMERCIAL

## 2022-06-20 DIAGNOSIS — R41.3 MEMORY CHANGES: ICD-10-CM

## 2022-06-20 PROCEDURE — 70551 MRI BRAIN STEM W/O DYE: CPT

## 2022-06-22 ENCOUNTER — VIRTUAL VISIT (OUTPATIENT)
Dept: PSYCHOLOGY | Facility: CLINIC | Age: 42
End: 2022-06-22
Payer: COMMERCIAL

## 2022-06-22 DIAGNOSIS — F41.1 GENERALIZED ANXIETY DISORDER: Primary | ICD-10-CM

## 2022-06-22 PROCEDURE — 90834 PSYTX W PT 45 MINUTES: CPT | Mod: GT | Performed by: COUNSELOR

## 2022-06-22 ASSESSMENT — ANXIETY QUESTIONNAIRES
5. BEING SO RESTLESS THAT IT IS HARD TO SIT STILL: SEVERAL DAYS
GAD7 TOTAL SCORE: 7
GAD7 TOTAL SCORE: 7
6. BECOMING EASILY ANNOYED OR IRRITABLE: NOT AT ALL
4. TROUBLE RELAXING: SEVERAL DAYS
2. NOT BEING ABLE TO STOP OR CONTROL WORRYING: SEVERAL DAYS
GAD7 TOTAL SCORE: 7
8. IF YOU CHECKED OFF ANY PROBLEMS, HOW DIFFICULT HAVE THESE MADE IT FOR YOU TO DO YOUR WORK, TAKE CARE OF THINGS AT HOME, OR GET ALONG WITH OTHER PEOPLE?: SOMEWHAT DIFFICULT
7. FEELING AFRAID AS IF SOMETHING AWFUL MIGHT HAPPEN: SEVERAL DAYS
1. FEELING NERVOUS, ANXIOUS, OR ON EDGE: MORE THAN HALF THE DAYS
7. FEELING AFRAID AS IF SOMETHING AWFUL MIGHT HAPPEN: SEVERAL DAYS
3. WORRYING TOO MUCH ABOUT DIFFERENT THINGS: SEVERAL DAYS

## 2022-06-22 NOTE — PROGRESS NOTES
[facility]                                     Progress Note    Patient Name: Nahum Chiu  Date: 2022         Service Type: Individual      Session Start Time: 1:00pm  Session End Time:       Session Length: 38-52 min    Session #:  7    Attendees: Client attended alone    Service Modality:  Video Visit:      Provider verified identity through the following two step process.  Patient provided:  Patient  and Patient address    Telemedicine Visit: The patient's condition can be safely assessed and treated via synchronous audio and visual telemedicine encounter.      Reason for Telemedicine Visit: Services only offered telehealth    Originating Site (Patient Location): Patient's place of employment    Distant Site (Provider Location): Provider Remote Setting- Home Office    Consent:  The patient/guardian has verbally consented to: the potential risks and benefits of telemedicine (video visit) versus in person care; bill my insurance or make self-payment for services provided; and responsibility for payment of non-covered services.     Patient would like the video invitation sent by:  My Chart    Mode of Communication:  Video Conference via Amwell    As the provider I attest to compliance with applicable laws and regulations related to telemedicine.    DATA  Interactive Complexity: No  Crisis: No        Progress Since Last Session (Related to Symptoms / Goals / Homework):   Symptoms: Worsening due to medical issues that have recently occured    Homework: Completed in session      Episode of Care Goals: Achieved / completed to satisfaction - RELAPSE (Returned to unhealthy behavior); Intervened by reassessing readiness to change and identifying appropriate stage.  Identified reasons for relapse, successes, and change talk     Current / Ongoing Stressors and Concerns:   Pt reports having issues with memory, forgetting names of people, forgetting words, forgetting that he had a niece, etc.  Reports overall difficulty thinking. He also reports an increase in stomach issues. This all began about mid-May of this year.   Pt reports feeling as if his emotions have been a roller coaster. He reported heightened anxiety over the weekend however experienced a couple of days where he was distracted from the anxious thoughts.      Treatment Objective(s) Addressed in This Session:   Goal- Anxiety: Patient will reduce overall frequency, intensity, and duration of the anxiety so that daily functioning is not impaired.  Objective #A   Patient will describe situations, thoughts, feelings, and actions associated with anxieties and worries, their impact on functioning and attempts to resolve them. Patient will do this 4 out of 7 days of the week.   - discussed situations that increase anxiety   Objective #B  Patient will use cognitive strategies identified in therapy to challenge negative thought patterns 90% of the time.  - discussed ways to frame thoughts  Objective #C  Patient will identify and use at least three coping skills and distraction and diversion activities to manage feelings of anxiety. Pt will use these skills at least three times per week.  - discussed coping and distraction skills     Intervention:   Therapist met with patient to review goals and interventions. Therapist utilized reflective listening as patient gave brief reflection of the week. Patient processed increase of anxiety.    Utilized CBT modality to identify automatic negative cognitions that lead to heightened anxiety related to medical issues. Walked pt through examples of how to catch and reframe automatic thought process.     Assessments completed prior to visit:  The following assessments were completed by patient for this visit:    PHQ9:   PHQ-9 SCORE 12/18/2021 12/29/2021 2/11/2022 2/11/2022   PHQ-9 Total Score MyChart - - - 3 (Minimal depression)   PHQ-9 Total Score 17 9 3 3     GAD7:   SRINIVAS-7 SCORE 12/18/2021 12/29/2021 12/29/2021  6/7/2022 6/22/2022   Total Score - - 6 (mild anxiety) - 7 (mild anxiety)   Total Score 8 6 6 11 7         ASSESSMENT: Current Emotional / Mental Status (status of significant symptoms):   Risk status (Self / Other harm or suicidal ideation)   Patient denies current fears or concerns for personal safety.   Patient denies current or recent suicidal ideation or behaviors.   Patient denies current or recent homicidal ideation or behaviors.   Patient denies current or recent self injurious behavior or ideation.   Patient denies other safety concerns.   Patient reports there has been no change in risk factors since their last session.     Patient reports there has been no change in protective factors since their last session.     Recommended that patient call 911 or go to the local ED should there be a change in any of these risk factors.     Appearance:   Appropriate    Eye Contact:   Good    Psychomotor Behavior: Normal    Attitude:   Cooperative    Orientation:   All   Speech    Rate / Production: Normal     Volume:  Normal    Mood:    Anxious    Affect:    Appropriate    Thought Content:  Clear    Thought Form:  Coherent  Logical    Insight:    Good      Medication Review:   No current psychiatric medications prescribed     Medication Compliance:   NA     Changes in Health Issues:   None reported     Chemical Use Review:   Substance Use: Chemical use reviewed, no active concerns identified      Tobacco Use: No current tobacco use.      Diagnosis:  1. Generalized anxiety disorder        Collateral Reports Completed:   Not Applicable    PLAN: (Patient Tasks / Therapist Tasks / Other)  1) Pt will continue morning running routine weekly  2) Pt will reframe anxious thoughts daily  3) Next session scheduled for 2 wks out        IBRAHIMA DE LA GARZA [facility]                                                         ______________________________________________________________________    Individual Treatment Plan    Patient's Name:  Nahum Chiu  YOB: 1980    Date of Creation: 1/5/22  Date Treatment Plan Last Reviewed/Revised: 6/7/2022; next review due 9/7/22    DSM5 Diagnoses: 300.02 (F41.1) Generalized Anxiety Disorder  Psychosocial / Contextual Factors: Patient is a 41 year old male who works full time and lives with wife and 2 kids.    PROMIS (reviewed every 90 days): assigned for next session    Referral / Collaboration:  Referral to another professional/service is not indicated at this time..    Anticipated number of session for this episode of care: 18-24  Anticipation frequency of session: Every other week  Anticipated Duration of each session: 38-52 minutes  Treatment plan will be reviewed in 90 days or when goals have been changed.       MeasurableTreatment Goal(s) related to diagnosis / functional impairment(s)  Goal- Anxiety: Patient will reduce overall frequency, intensity, and duration of the anxiety so that daily functioning is not impaired.     I will know I've met my goal when I know what to do with the anxiety that I feel about health.      Objective #A (Client Action)  Patient will describe situations, thoughts, feelings, and actions associated with anxieties and worries, their impact on functioning and attempts to resolve them. Patient will do this 4 out of 7 days of the week.     Intervention(s)  Therapist will provide psychoeducation, behavioral activation, and cognitive restructuring.  Status: Continued - Date: 1/5/2022; 6/7/2022      Objective #B  Patient will use cognitive strategies identified in therapy to challenge negative thought patterns 90% of the time.    Intervention(s)  Therapist will provide psychoeducation, behavioral activation, and cognitive restructuring.  Status: Continued - Date: 1/5/2022; 6/7/2022    Objective #C  Patient will identify and use at least three coping skills and distraction and diversion activities to manage feelings of anxiety. Pt will use these skills at least three  times per week.    Intervention(s)  Therapist will provide psychoeducation, behavioral activation, and cognitive restructuring.  Status: Continued - Date: 1/5/2022; 6/7/2022      Patient has reviewed and agreed to the above plan.      IBRAHIMA DE LA GARZA, [facility]  1/5/22; 6/7/2022

## 2022-06-29 ENCOUNTER — OFFICE VISIT (OUTPATIENT)
Dept: FAMILY MEDICINE | Facility: CLINIC | Age: 42
End: 2022-06-29
Payer: COMMERCIAL

## 2022-06-29 VITALS
RESPIRATION RATE: 18 BRPM | HEIGHT: 73 IN | HEART RATE: 61 BPM | BODY MASS INDEX: 28.15 KG/M2 | SYSTOLIC BLOOD PRESSURE: 110 MMHG | WEIGHT: 212.4 LBS | DIASTOLIC BLOOD PRESSURE: 62 MMHG | OXYGEN SATURATION: 97 % | TEMPERATURE: 98.2 F

## 2022-06-29 DIAGNOSIS — F41.1 GENERALIZED ANXIETY DISORDER: Primary | ICD-10-CM

## 2022-06-29 PROCEDURE — 99213 OFFICE O/P EST LOW 20 MIN: CPT | Performed by: FAMILY MEDICINE

## 2022-06-29 PROCEDURE — 96127 BRIEF EMOTIONAL/BEHAV ASSMT: CPT | Performed by: FAMILY MEDICINE

## 2022-06-29 RX ORDER — VENLAFAXINE HYDROCHLORIDE 37.5 MG/1
37.5 CAPSULE, EXTENDED RELEASE ORAL DAILY
Qty: 30 CAPSULE | Refills: 1 | Status: SHIPPED | OUTPATIENT
Start: 2022-06-29 | End: 2022-07-27

## 2022-06-29 ASSESSMENT — ENCOUNTER SYMPTOMS: NERVOUS/ANXIOUS: 1

## 2022-06-29 NOTE — PROGRESS NOTES
Assessment & Plan     Generalized anxiety disorder  Ongoing symptoms, recommend starting Effexor.  Monitor for mamta.  Patient had concerns for ongoing memory issues, my partner already had a negative brain MRI 6/20/2022, like work-up unremarkable for thyroid disorders, electrolyte derangements or vitamin B12 deficiency.  Reassured patient of nonfocal neurological exam findings today as he was wondering about MRI with contrast.  Follow-up in a month for recheck  - venlafaxine (EFFEXOR XR) 37.5 MG 24 hr capsule  Dispense: 30 capsule; Refill: 1  - EMOTIONAL / BEHAVIORAL ASSESSMENT        Return in about 1 month (around 7/29/2022) for If symptoms do not improve or gets worse..    Roman Reilly MD  [facility]    José Miguel Sidhu is a 41 year old, presenting for the following health issues:  Memory Loss and Anxiety      Anxiety    History of Present Illness       Mental Health Follow-up:  Patient presents to follow-up on Anxiety.    Patient's anxiety since last visit has been:  Medium  The patient is having other symptoms associated with anxiety.  Any significant life events: health concerns  Patient is feeling anxious or having panic attacks.  Patient has no concerns about alcohol or drug use.    He eats 2-3 servings of fruits and vegetables daily.He consumes 0 sweetened beverage(s) daily.He exercises with enough effort to increase his heart rate 9 or less minutes per day.  He exercises with enough effort to increase his heart rate 3 or less days per week.   He is taking medications regularly.     SRINIVAS-7 SCORE 12/29/2021 6/7/2022 6/22/2022   Total Score 6 (mild anxiety) - 7 (mild anxiety)   Total Score 6 11 7       In the last 6 weeks has been noticing difficulty with short-term memory, increased anxiety.    Review of Systems   Psychiatric/Behavioral: The patient is nervous/anxious.             Objective    /62   Pulse 61   Temp 98.2  F (36.8  C) (Oral)   Resp 18   Ht 1.842 m (6'  "0.5\")   Wt 96.3 kg (212 lb 6.4 oz)   SpO2 97%   BMI 28.41 kg/m    Body mass index is 28.41 kg/m .  Physical Exam  Vitals reviewed.   Constitutional:       Appearance: He is not ill-appearing.   Cardiovascular:      Rate and Rhythm: Normal rate and regular rhythm.   Pulmonary:      Effort: Pulmonary effort is normal.      Breath sounds: Normal breath sounds.   Neurological:      General: No focal deficit present.   Psychiatric:         Mood and Affect: Mood normal.         Behavior: Behavior normal.         Thought Content: Thought content normal.         Judgment: Judgment normal.                            .  ..  "

## 2022-07-15 ENCOUNTER — VIRTUAL VISIT (OUTPATIENT)
Dept: BEHAVIORAL HEALTH | Facility: HOSPITAL | Age: 42
End: 2022-07-15
Payer: COMMERCIAL

## 2022-07-15 DIAGNOSIS — F41.8 OTHER SPECIFIED ANXIETY DISORDERS: Primary | ICD-10-CM

## 2022-07-15 PROCEDURE — 90834 PSYTX W PT 45 MINUTES: CPT | Mod: GT | Performed by: COUNSELOR

## 2022-07-15 NOTE — PROGRESS NOTES
[facility]                                     Progress Note    Patient Name: Nahum Chiu  Date: 7/15/22         Service Type: Individual      Session Start Time: 4:01pm  Session End Time: 4:53 pm     Session Length: 52 minutes    Session #: 1    Attendees: Client attended alone    Service Modality:  Video Visit:      Provider verified identity through the following two step process.  Patient provided:  Patient  and Patient address    Telemedicine Visit: The patient's condition can be safely assessed and treated via synchronous audio and visual telemedicine encounter.      Reason for Telemedicine Visit: Services only offered telehealth    Originating Site (Patient Location): Patient's other car (not moving)    Distant Site (Provider Location): Provider Remote Setting- Home Office    Consent:  The patient/guardian has verbally consented to: the potential risks and benefits of telemedicine (video visit) versus in person care; bill my insurance or make self-payment for services provided; and responsibility for payment of non-covered services.     Patient would like the video invitation sent by:  My Chart    Mode of Communication:  Video Conference via [employer]well    As the provider I attest to compliance with applicable laws and regulations related to telemedicine.    DATA  Interactive Complexity: No  Crisis: No        Progress Since Last Session (Related to Symptoms / Goals / Homework):   Symptoms: No change in symptoms since last session    Homework: Achieved / completed to satisfaction      Episode of Care Goals: Satisfactory progress - PREPARATION (Decided to change - considering how); Intervened by negotiating a change plan and determining options / strategies for behavior change, identifying triggers, exploring social supports, and working towards setting a date to begin behavior change     Current / Ongoing Stressors and Concerns:   Pt is currently seeking therapy due to heightened  anxiety related to medical/health and recent memory concerns. Pt was transferred to writer due to his regular therapist going on leave. Pt provided a background on what he has been doing in therapy up to this point and reviewed what has been challenging. Pt confirmed that this has been a chronic pattern for him since at least his 20's. Pt also discussed his recent change in symptoms and that overall they are still present but not as intense as last time. Therapist (writer) provided information on health anxiety and discussed the pattern of behavior that pt described. Pt confirmed agreement that what was discussed reflected his experience. Ways to embrace uncertainty and reduce avoidance were reviewed.     Treatment Objective(s) Addressed in This Session:   identify health related thoughts, concerns, sensation, and  stressors which contribute to feelings of anxiety  use cognitive strategies identified in therapy to challenge anxious thoughts  Identify negative self-talk and behaviors: challenge core beliefs, myths, and actions  learn at least 3 self-regulation strategies  Review past work and trx plan     Intervention:   CBT: Discussed how Avoidance, Reassurance/certanty seeking, and checking (anxiety management) are not helpful when trying to manage health/illness anxiety    Motivational Interviewing    MI Intervention: Expressed Empathy/Understanding, Supported Autonomy, Collaboration, Evocation, Open-ended questions, Reflections: simple and complex and Reframe     Change Talk Expressed by the Patient: Desire to change Need to change Activation Taking steps    Provider Response to Change Talk: E - Evoked more info from patient about behavior change, A - Affirmed patient's thoughts, decisions, or attempts at behavior change, R - Reflected patient's change talk and S - Summarized patient's change talk statements    Psychodynamic: Processed through internal-experiences related to past and current health  concerns  Solution Focused: Identified ways in which his current symptoms impact his ability to reach his goals    Assessments completed prior to visit:  PHQ2:   PHQ-2 ( 1999 [employer]) 6/29/2022 6/22/2022 2/11/2022 2/11/2022 1/18/2022 12/18/2021 12/13/2021   Q1: Little interest or pleasure in doing things 1 1 2 2 0 3 0   Q2: Feeling down, depressed or hopeless 1 1 1 1 0 3 0   PHQ-2 Score 2 2 3 3 0 6 0   PHQ-2 Total Score (12-17 Years)- Positive if 3 or more points; Administer PHQ-A if positive - - - - - - -   Q1: Little interest or pleasure in doing things Several days Several days More than half the days - - - Not at all   Q2: Feeling down, depressed or hopeless Several days Several days Several days - - - Not at all   PHQ-2 Score 2 2 3 - - - 0     PHQ9:   PHQ-9 SCORE 12/18/2021 12/29/2021 2/11/2022 2/11/2022   PHQ-9 Total Score MyChart - - - 3 (Minimal depression)   PHQ-9 Total Score 17 9 3 3     GAD2:   SRINIVAS-2 12/29/2021 12/29/2021 6/22/2022   Feeling nervous, anxious, or on edge 2 2 2   Not being able to stop or control worrying 2 2 2   SRINIVAS-2 Total Score 4 4 4     GAD7:   SRINIVAS-7 SCORE 12/18/2021 12/29/2021 12/29/2021 6/7/2022 6/22/2022   Total Score - - 6 (mild anxiety) - 7 (mild anxiety)   Total Score 8 6 6 11 7     PROMIS 10-Global Health (only subscores and total score):   PROMIS-10 Scores Only 12/29/2021 12/29/2021 6/22/2022   Global Mental Health Score 12 12 9   Global Physical Health Score 15 15 15   PROMIS TOTAL - SUBSCORES 27 27 24         ASSESSMENT: Current Emotional / Mental Status (status of significant symptoms):   Risk status (Self / Other harm or suicidal ideation)   Patient denies current fears or concerns for personal safety.   Patient denies current or recent suicidal ideation or behaviors.   Patient denies current or recent homicidal ideation or behaviors.   Patient denies current or recent self injurious behavior or ideation.   Patient denies other safety concerns.   Patient reports there has  "been no change in risk factors since their last session.     Patient reports there has been no change in protective factors since their last session.     Recommended that patient call 911 or go to the local ED should there be a change in any of these risk factors.     Appearance:   Appropriate    Eye Contact:   Good    Psychomotor Behavior: Normal    Attitude:   Cooperative  Interested Pleasant   Orientation:   All   Speech    Rate / Production: Normal/ Responsive Talkative    Volume:  Normal    Mood:    Anxious  Normal   Affect:    Appropriate    Thought Content:  Clear    Thought Form:  Coherent  Logical    Insight:    Fair  and Intellectual Insight     Medication Review:   Changes to psychiatric medications, see updated Medication List in EPIC.      Medication Compliance:   Yes     Changes in Health Issues:   None reported (Pt continues to have concerns related to memory)     Chemical Use Review:   Substance Use: Chemical use reviewed, no active concerns identified      Tobacco Use: No current tobacco use.      Diagnosis:  1. Other specified anxiety disorders        Collateral Reports Completed:   Not Applicable    PLAN: (Patient Tasks / Therapist Tasks / Other)  Continue with morning work out routine  Practice breathing exercises through your apps  Attempt to reframe  Embrace uncertainty with \"Maybe, maybe not, I'll never know\"      Roberto Reza [facility]                                                         ______________________________________________________________________    Individual Treatment Plan    Patient's Name: Nahum Chiu  YOB: 1980    Date of Creation: 1/5/22  Date Treatment Plan Last Reviewed/Revised: 6/7/2022; next review due 9/7/22    DSM5 Diagnoses: 300.02 (F41.1) Generalized Anxiety Disorder  Psychosocial / Contextual Factors: Patient is a 41 year old male who works full time and lives with wife and 2 kids.    PROMIS (reviewed every 90 days):   PROMIS 10-Global Health (only " subscores and total score):   PROMIS-10 Scores Only 12/29/2021 12/29/2021 6/22/2022   Global Mental Health Score 12 12 9   Global Physical Health Score 15 15 15   PROMIS TOTAL - SUBSCORES 27 27 24       Referral / Collaboration:  Referral to another professional/service is not indicated at this time..    Anticipated number of session for this episode of care: 8-13  Anticipation frequency of session: Biweekly  Anticipated Duration of each session: 38-52 minutes  Treatment plan will be reviewed in 90 days or when goals have been changed.       MeasurableTreatment Goal(s) related to diagnosis / functional impairment(s)  Goal- Anxiety: Patient will reduce overall frequency, intensity, and duration of the anxiety so that daily functioning is not impaired.     I will know I've met my goal when I know what to do with the anxiety that I feel about health.       Objective #A (Client Action)  Patient will describe situations, thoughts, feelings, and actions associated with anxieties and worries, their impact on functioning and attempts to resolve them. Patient will do this 4 out of 7 days of the week.      Intervention(s)  Therapist will provide psychoeducation, behavioral activation, and cognitive restructuring.  Status: Continued - Date: 1/5/2022; 6/7/2022        Objective #B  Patient will use cognitive strategies identified in therapy to challenge negative thought patterns 90% of the time.     Intervention(s)  Therapist will provide psychoeducation, behavioral activation, and cognitive restructuring.  Status: Continued - Date: 1/5/2022; 6/7/2022     Objective #C  Patient will identify and use at least three coping skills and distraction and diversion activities to manage feelings of anxiety. Pt will use these skills at least three times per week.     Intervention(s)  Therapist will provide psychoeducation, behavioral activation, and cognitive restructuring.  Status: Continued - Date: 1/5/2022; 6/7/2022        Patient has  reviewed and agreed to the above plan.        IBRAHIMA DE LA GARZA, [facility]                         1/5/22; 6/7/2022

## 2022-07-24 DIAGNOSIS — F41.1 GENERALIZED ANXIETY DISORDER: ICD-10-CM

## 2022-07-24 RX ORDER — VENLAFAXINE HYDROCHLORIDE 37.5 MG/1
37.5 CAPSULE, EXTENDED RELEASE ORAL DAILY
Qty: 30 CAPSULE | Refills: 1 | Status: CANCELLED | OUTPATIENT
Start: 2022-07-24

## 2022-07-24 ASSESSMENT — ANXIETY QUESTIONNAIRES
7. FEELING AFRAID AS IF SOMETHING AWFUL MIGHT HAPPEN: SEVERAL DAYS
IF YOU CHECKED OFF ANY PROBLEMS ON THIS QUESTIONNAIRE, HOW DIFFICULT HAVE THESE PROBLEMS MADE IT FOR YOU TO DO YOUR WORK, TAKE CARE OF THINGS AT HOME, OR GET ALONG WITH OTHER PEOPLE: SOMEWHAT DIFFICULT
1. FEELING NERVOUS, ANXIOUS, OR ON EDGE: SEVERAL DAYS
8. IF YOU CHECKED OFF ANY PROBLEMS, HOW DIFFICULT HAVE THESE MADE IT FOR YOU TO DO YOUR WORK, TAKE CARE OF THINGS AT HOME, OR GET ALONG WITH OTHER PEOPLE?: SOMEWHAT DIFFICULT
3. WORRYING TOO MUCH ABOUT DIFFERENT THINGS: SEVERAL DAYS
6. BECOMING EASILY ANNOYED OR IRRITABLE: NOT AT ALL
7. FEELING AFRAID AS IF SOMETHING AWFUL MIGHT HAPPEN: SEVERAL DAYS
5. BEING SO RESTLESS THAT IT IS HARD TO SIT STILL: NOT AT ALL
GAD7 TOTAL SCORE: 5
4. TROUBLE RELAXING: SEVERAL DAYS
2. NOT BEING ABLE TO STOP OR CONTROL WORRYING: SEVERAL DAYS
GAD7 TOTAL SCORE: 5

## 2022-07-27 ENCOUNTER — OFFICE VISIT (OUTPATIENT)
Dept: NEUROLOGY | Facility: CLINIC | Age: 42
End: 2022-07-27
Attending: FAMILY MEDICINE
Payer: COMMERCIAL

## 2022-07-27 ENCOUNTER — OFFICE VISIT (OUTPATIENT)
Dept: FAMILY MEDICINE | Facility: CLINIC | Age: 42
End: 2022-07-27
Payer: COMMERCIAL

## 2022-07-27 VITALS
OXYGEN SATURATION: 98 % | BODY MASS INDEX: 28.63 KG/M2 | RESPIRATION RATE: 12 BRPM | SYSTOLIC BLOOD PRESSURE: 112 MMHG | DIASTOLIC BLOOD PRESSURE: 90 MMHG | HEIGHT: 73 IN | WEIGHT: 216 LBS | HEART RATE: 64 BPM | TEMPERATURE: 98.5 F

## 2022-07-27 VITALS
HEIGHT: 72 IN | BODY MASS INDEX: 29.55 KG/M2 | WEIGHT: 218.2 LBS | SYSTOLIC BLOOD PRESSURE: 129 MMHG | TEMPERATURE: 98.2 F | HEART RATE: 69 BPM | DIASTOLIC BLOOD PRESSURE: 81 MMHG

## 2022-07-27 DIAGNOSIS — F41.1 GENERALIZED ANXIETY DISORDER: Primary | ICD-10-CM

## 2022-07-27 DIAGNOSIS — M54.6 ACUTE LEFT-SIDED THORACIC BACK PAIN: ICD-10-CM

## 2022-07-27 DIAGNOSIS — R41.3 MEMORY LOSS: ICD-10-CM

## 2022-07-27 PROCEDURE — 99213 OFFICE O/P EST LOW 20 MIN: CPT | Performed by: FAMILY MEDICINE

## 2022-07-27 RX ORDER — VENLAFAXINE HYDROCHLORIDE 37.5 MG/1
37.5 CAPSULE, EXTENDED RELEASE ORAL DAILY
Qty: 90 CAPSULE | Refills: 3 | Status: SHIPPED | OUTPATIENT
Start: 2022-07-27 | End: 2023-05-11

## 2022-07-27 ASSESSMENT — ENCOUNTER SYMPTOMS
NERVOUS/ANXIOUS: 1
BACK PAIN: 1

## 2022-07-27 ASSESSMENT — ANXIETY QUESTIONNAIRES: GAD7 TOTAL SCORE: 5

## 2022-07-27 NOTE — PROGRESS NOTES
Assessment & Plan     Generalized anxiety disorder  In remission, continue Effexor  - venlafaxine (EFFEXOR XR) 37.5 MG 24 hr capsule  Dispense: 90 capsule; Refill: 3    Acute left-sided thoracic back pain  Possibly due to muscular strain, encouraged routine physical activity, exam findings not suspicious.  No radicular symptoms.        Return in about 1 year (around 7/27/2023) for mental health .    Roman Reilly MD  [facility] Sidhu is a 42 year old, presenting for the following health issues:  Anxiety (Follow up) and Back Pain      History of Present Illness       Back Pain:  He presents for follow up of back pain. Patient's back pain is a new problem.    Original cause of back pain: not sure  First noticed back pain: 1-4 weeks ago  Patient feels back pain: comes and goesLocation of back pain:  Left middle of back  Description of back pain: dull ache  Back pain spreads: nowhere    Since patient first noticed back pain, pain is: always present, but gets better and worse  Does back pain interfere with his job:  No  On a scale of 1-10 (10 being the worst), patient describes pain as:  3  What makes back pain worse: certain positions  Acupuncture: not tried  Acetaminophen: not tried  Activity or exercise: not tried  Chiropractor:  Not tried  Cold: not tried  Heat: not helpful  Massage: not tried  Muscle relaxants: not tried  NSAIDS: not tried  Opioids: not tried  Physical Therapy: not tried  Rest: not helpful  Steroid Injection: not tried  Stretching: helpful  Surgery: not tried  TENS unit: not tried  Topical pain relievers: not tried  Other healthcare providers patient is seeing for back pain: None    Mental Health Follow-up:  Patient presents to follow-up on Anxiety.    Patient's anxiety since last visit has been:  Better  The patient is not having other symptoms associated with anxiety.  Any significant life events: No  Patient is feeling anxious or having panic  "attacks.  Patient has no concerns about alcohol or drug use.    He eats 0-1 servings of fruits and vegetables daily.He consumes 0 sweetened beverage(s) daily.He exercises with enough effort to increase his heart rate 9 or less minutes per day.  He exercises with enough effort to increase his heart rate 3 or less days per week.   He is taking medications regularly.  Today's SRINIVAS-7 Score: 5       Patient is a pleasant 42-year-old male who presents for interval mental health follow-up and has concerns for often ongoing back pain.  Left thoracic, no inciting event, has been going on for months, occasionally feels like a burning pain, has never seen any vesicular rashes.  No associated fevers.  No numbness weakness tingling, unintentional weight loss.     Review of Systems   Musculoskeletal: Positive for back pain.   Psychiatric/Behavioral: The patient is nervous/anxious.             Objective    BP (!) 112/90 (Cuff Size: Adult Large)   Pulse 64   Temp 98.5  F (36.9  C)   Resp 12   Ht 1.842 m (6' 0.5\")   Wt 98 kg (216 lb)   SpO2 98%   BMI 28.89 kg/m    Body mass index is 28.89 kg/m .  Physical Exam  Vitals reviewed.   Constitutional:       Appearance: Normal appearance.   Cardiovascular:      Rate and Rhythm: Normal rate and regular rhythm.   Pulmonary:      Effort: Pulmonary effort is normal.      Breath sounds: Normal breath sounds. No stridor.   Musculoskeletal:         General: No tenderness.   Neurological:      Mental Status: He is alert.              The 10-year ASCVD risk score (Sapnalouis PEÑA Jr., et al., 2013) is: 1.2%    Values used to calculate the score:      Age: 42 years      Sex: Male      Is Non- : No      Diabetic: No      Tobacco smoker: No      Systolic Blood Pressure: 112 mmHg      Is BP treated: No      HDL Cholesterol: 53 mg/dL      Total Cholesterol: 193 mg/dL                    .  ..  "

## 2022-07-27 NOTE — PROGRESS NOTES
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 Relevant history of SRINIVAS, seasonal allergies and GERD.    Referred by Dr. Chelsie Garcia ([facility]) for memory concerns.     He presents alone.   Mid May he had trouble remembering names of acquaintances or people at his work who he loosely works with. His wife was watching a video of her brother with 2 kids in a buggy (one was 7 or so months old) and Nahum forgot that he this niece had been born to begin with. He can feel that he slurs words. He feels that he is more reserved because of fear of embarrassing speech issues. He needs to stop and things because of things that used to be automatic. Pressing the right button at work -- he has to delay for a second or two before knowing which button to press. He generally feels that he never totally forgets something -- with a reminder or time the memory comes back.    He reports having a generally good sleep. If anything he can sleep more when he is feeling low. He snored while sleeping. No daytime sleepiness. No overt obstructive symptoms reported. No OTC sleep aids. No dream enactment behavior.    No convulsive activity, hemibody weakness or sudden language disturbance.  Mild headache since starting Venlafaxine. No scintillating lights, geometric shapes.   He feels that signs off in the distance may be less clear than previous.    No performance issues at work. No loss of functional abilities.   No hallucinations or psychotic symptoms.  No rashes, unexplained fevers, weight loss.        MEDS:  Current Outpatient Medications   Medication Sig Dispense Refill     fexofenadine (ALLEGRA) 180 MG tablet Take 180 mg by mouth daily       pantoprazole (PROTONIX) 40 MG EC tablet        sucralfate (CARAFATE) 1 GM tablet take 1 tablet by mouth up to 4 times a day. Crush  pill and mix with 2-3 tablespoons of water and drink to coat esophagus/stomach.       venlafaxine (EFFEXOR XR) 37.5 MG 24 hr capsule Take 1 capsule (37.5 mg) by mouth daily 90 capsule 3       PROBLEM LIST:  Patient Active Problem List   Diagnosis     GERD (gastroesophageal reflux disease)     Allergic reaction, initial encounter        PMHx:  Past Medical History:   Diagnosis Date     Anxiety 2021    on medication in 20's but not for a long time - seeing therapist now     Environmental allergies      GERD (gastroesophageal reflux disease)        FHx:  Maternal grandmother: dementia with symptoms starting around 70 years old  His dad (currently 80 years old) was an only child and Nahum did not know his dad's parents, but not because they  or had strange behavior (they just didn't keep in touch).     SHx:  Drinks 1 cup of coffee in the morning  He works as a   1 night of alcohol per week.  No tobacco.  No marijuana or marijuana products or CBD. No substance use.  No herbal remedies or traditional meds.  No concussion. No contact sport exposure.  Strongly right handed.    Physical Exam:  MMSE: 28/30 (-1 city, thought it was [place], -1 delayed recall but +1 with )  Neuro: Normal elementary neurological exam. Awake, alert, normal fluent and logical conversation without dysarthria. Normal gait including toe, heel and tandem walking. Rhomberg negative. Normal optic discs. Normal peripheral visual fields without visual simultagnosia. Normal saccadic initiation, velocity and amplitude. Normal appendicular and axial tone. Grossly normal strength. Normal rapid alternating movements of the fingers and feet. Normal appendicular deep tendon reflexes. Plantar response mute bilaterally. Normal vibratory sense in the distal upper and lower limbs. Normal finger-to-nose and heel-to-shin. No tremor.    Objective Testing:  Labs :  CMP, TSH, B12, CBC normal    I personally reviewed the non-contrast MRI brain from  June 2022 that was normal.      Assessment/Plan:  Mr. Nahum Chiu is a right-handed 42 year old man with subjective cognitive concerns. There is no evidence for a sinister cause (e.g. dementing illness, primary brain disease, etc). Symptoms are compatible with middle age. Daily Allegra use could also be contributing to thought-blocking and problems with memory retrieval, so I recommended he take this on an as-needed basis.   Generally recommendations including daily aerobic exercise, restorative sleep, and staying socially/intellectually active.    Follow-up as needed.    Today, I spent 47 minutes reviewing the chart, personally assessing objective testing, direct patient care, completing documentation and billing.

## 2022-07-27 NOTE — LETTER
7/27/2022     RE: Nahum Chiu  [id number] [facility] [id number]     Dear Colleague,    Thank you for referring your patient, Nahum Chiu, to the [facility]. Please see a copy of my visit note below.    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 Relevant history of SRINIVAS, seasonal allergies and GERD.    Referred by Dr. Chelsie Garcia ([facility]) for memory concerns.     He presents alone.   Mid May he had trouble remembering names of acquaintances or people at his work who he loosely works with. His wife was watching a video of her brother with 2 kids in a buggy (one was 7 or so months old) and Nahum forgot that he this niece had been born to begin with. He can feel that he slurs words. He feels that he is more reserved because of fear of embarrassing speech issues. He needs to stop and things because of things that used to be automatic. Pressing the right button at work -- he has to delay for a second or two before knowing which button to press. He generally feels that he never totally forgets something -- with a reminder or time the memory comes back.    He reports having a generally good sleep. If anything he can sleep more when he is feeling low. He snored while sleeping. No daytime sleepiness. No overt obstructive symptoms reported. No OTC sleep aids. No dream enactment behavior.    No convulsive activity, hemibody weakness or sudden language disturbance.  Mild headache since starting Venlafaxine. No scintillating lights, geometric shapes.   He feels that signs off in the distance may be less clear than previous.    No performance issues at work. No loss of functional abilities.   No hallucinations or psychotic symptoms.  No rashes, unexplained fevers, weight  loss.        MEDS:  Current Outpatient Medications   Medication Sig Dispense Refill     fexofenadine (ALLEGRA) 180 MG tablet Take 180 mg by mouth daily       pantoprazole (PROTONIX) 40 MG EC tablet        sucralfate (CARAFATE) 1 GM tablet take 1 tablet by mouth up to 4 times a day. Crush pill and mix with 2-3 tablespoons of water and drink to coat esophagus/stomach.       venlafaxine (EFFEXOR XR) 37.5 MG 24 hr capsule Take 1 capsule (37.5 mg) by mouth daily 90 capsule 3       PROBLEM LIST:  Patient Active Problem List   Diagnosis     GERD (gastroesophageal reflux disease)     Allergic reaction, initial encounter        PMHx:  Past Medical History:   Diagnosis Date     Anxiety 2021    on medication in 20's but not for a long time - seeing therapist now     Environmental allergies      GERD (gastroesophageal reflux disease)        FHx:  Maternal grandmother: dementia with symptoms starting around 70 years old  His dad (currently 80 years old) was an only child and Nahum did not know his dad's parents, but not because they  or had strange behavior (they just didn't keep in touch).     SHx:  Drinks 1 cup of coffee in the morning  He works as a   1 night of alcohol per week.  No tobacco.  No marijuana or marijuana products or CBD. No substance use.  No herbal remedies or traditional meds.  No concussion. No contact sport exposure.  Strongly right handed.    Physical Exam:  MMSE: 28/30 (-1 city, thought it was [place], -1 delayed recall but +1 with )  Neuro: Normal elementary neurological exam. Awake, alert, normal fluent and logical conversation without dysarthria. Normal gait including toe, heel and tandem walking. Rhomberg negative. Normal optic discs. Normal peripheral visual fields without visual simultagnosia. Normal saccadic initiation, velocity and amplitude. Normal appendicular and axial tone. Grossly normal strength. Normal rapid alternating movements of the fingers and feet. Normal appendicular  deep tendon reflexes. Plantar response mute bilaterally. Normal vibratory sense in the distal upper and lower limbs. Normal finger-to-nose and heel-to-shin. No tremor.    Objective Testing:  Labs 2022:  CMP, TSH, B12, CBC normal    I personally reviewed the non-contrast MRI brain from June 2022 that was normal.      Assessment/Plan:  Mr. Nahum Chiu is a right-handed 42 year old man with subjective cognitive concerns. There is no evidence for a sinister cause (e.g. dementing illness, primary brain disease, etc). Symptoms are compatible with middle age. Daily Allegra use could also be contributing to thought-blocking and problems with memory retrieval, so I recommended he take this on an as-needed basis.   Generally recommendations including daily aerobic exercise, restorative sleep, and staying socially/intellectually active.    Follow-up as needed.    Today, I spent 47 minutes reviewing the chart, personally assessing objective testing, direct patient care, completing documentation and billing.    Again, thank you for allowing me to participate in the care of your patient.      Sincerely,    Leland Marion MD

## 2022-08-05 ENCOUNTER — TRANSFERRED RECORDS (OUTPATIENT)
Dept: HEALTH INFORMATION MANAGEMENT | Facility: CLINIC | Age: 42
End: 2022-08-05

## 2022-08-05 ENCOUNTER — VIRTUAL VISIT (OUTPATIENT)
Dept: BEHAVIORAL HEALTH | Facility: HOSPITAL | Age: 42
End: 2022-08-05
Payer: COMMERCIAL

## 2022-08-05 DIAGNOSIS — F41.8 OTHER SPECIFIED ANXIETY DISORDERS: Primary | ICD-10-CM

## 2022-08-05 PROCEDURE — 90837 PSYTX W PT 60 MINUTES: CPT | Mod: GT | Performed by: COUNSELOR

## 2022-09-08 ENCOUNTER — TRANSFERRED RECORDS (OUTPATIENT)
Dept: HEALTH INFORMATION MANAGEMENT | Facility: CLINIC | Age: 42
End: 2022-09-08

## 2022-09-10 ENCOUNTER — HEALTH MAINTENANCE LETTER (OUTPATIENT)
Age: 42
End: 2022-09-10

## 2023-04-27 ENCOUNTER — OFFICE VISIT (OUTPATIENT)
Dept: FAMILY MEDICINE | Facility: CLINIC | Age: 43
End: 2023-04-27
Payer: COMMERCIAL

## 2023-04-27 VITALS
RESPIRATION RATE: 14 BRPM | HEART RATE: 78 BPM | BODY MASS INDEX: 30.48 KG/M2 | SYSTOLIC BLOOD PRESSURE: 109 MMHG | OXYGEN SATURATION: 96 % | HEIGHT: 72 IN | WEIGHT: 225 LBS | TEMPERATURE: 98.3 F | DIASTOLIC BLOOD PRESSURE: 76 MMHG

## 2023-04-27 DIAGNOSIS — M54.9 MID BACK PAIN ON LEFT SIDE: ICD-10-CM

## 2023-04-27 DIAGNOSIS — L42 PITYRIASIS ROSEA: Primary | ICD-10-CM

## 2023-04-27 PROCEDURE — 99213 OFFICE O/P EST LOW 20 MIN: CPT | Performed by: FAMILY MEDICINE

## 2023-04-27 NOTE — PROGRESS NOTES
Assessment & Plan     Pityriasis rosea - discussed diagnosis and possible causes. Since he is not having itching, no need to treat. Discussed typical length of symptoms. He is agreeable to cautious monitoring.     Mid back pain on left side - likely MSK - advised chiropractic care.     Mercedez Hackett MD  [facility] LONNIE Sidhu is a 42 year old, presenting for the following health issues:  Derm Problem and Back Pain         View : No data to display.              HPI     Red spots on the trunk area for 2 weeks. No itching or fevers. Stomach bug for couple days when rash appeared    Mid back pain left side for 2 weeks.         Review of Systems   Constitutional, HEENT, cardiovascular, pulmonary, gi and gu systems are negative, except as otherwise noted.      Objective    /76 (BP Location: Right arm, Patient Position: Chair, Cuff Size: Adult Large)   Pulse 78   Temp 98.3  F (36.8  C) (Oral)   Resp 14   Ht 1.829 m (6')   Wt 102.1 kg (225 lb)   SpO2 96%   BMI 30.52 kg/m    Body mass index is 30.52 kg/m .  Physical Exam   GENERAL: healthy, alert and no distress  MS: no gross musculoskeletal defects noted, no edema  SKIN: oblong erythematous macules and patches on the chest and back, cigarette paper scaling within

## 2023-05-11 DIAGNOSIS — F41.1 GENERALIZED ANXIETY DISORDER: ICD-10-CM

## 2023-05-11 RX ORDER — VENLAFAXINE HYDROCHLORIDE 37.5 MG/1
37.5 CAPSULE, EXTENDED RELEASE ORAL DAILY
Qty: 90 CAPSULE | Refills: 2 | Status: SHIPPED | OUTPATIENT
Start: 2023-05-11 | End: 2024-02-19

## 2023-05-11 NOTE — TELEPHONE ENCOUNTER
Patient switched pharmacies but they state they dont have a refill at all     venlafaxine (EFFEXOR XR) 37.5 MG 24 hr capsule    Michaela Candelario/

## 2023-06-29 SDOH — ECONOMIC STABILITY: FOOD INSECURITY: WITHIN THE PAST 12 MONTHS, THE FOOD YOU BOUGHT JUST DIDN'T LAST AND YOU DIDN'T HAVE MONEY TO GET MORE.: NEVER TRUE

## 2023-06-29 SDOH — HEALTH STABILITY: PHYSICAL HEALTH: ON AVERAGE, HOW MANY MINUTES DO YOU ENGAGE IN EXERCISE AT THIS LEVEL?: 20 MIN

## 2023-06-29 SDOH — ECONOMIC STABILITY: TRANSPORTATION INSECURITY
IN THE PAST 12 MONTHS, HAS THE LACK OF TRANSPORTATION KEPT YOU FROM MEDICAL APPOINTMENTS OR FROM GETTING MEDICATIONS?: NO

## 2023-06-29 SDOH — ECONOMIC STABILITY: FOOD INSECURITY: WITHIN THE PAST 12 MONTHS, YOU WORRIED THAT YOUR FOOD WOULD RUN OUT BEFORE YOU GOT MONEY TO BUY MORE.: NEVER TRUE

## 2023-06-29 SDOH — HEALTH STABILITY: PHYSICAL HEALTH: ON AVERAGE, HOW MANY DAYS PER WEEK DO YOU ENGAGE IN MODERATE TO STRENUOUS EXERCISE (LIKE A BRISK WALK)?: 3 DAYS

## 2023-06-29 SDOH — ECONOMIC STABILITY: INCOME INSECURITY: HOW HARD IS IT FOR YOU TO PAY FOR THE VERY BASICS LIKE FOOD, HOUSING, MEDICAL CARE, AND HEATING?: NOT HARD AT ALL

## 2023-06-29 SDOH — ECONOMIC STABILITY: TRANSPORTATION INSECURITY
IN THE PAST 12 MONTHS, HAS LACK OF TRANSPORTATION KEPT YOU FROM MEETINGS, WORK, OR FROM GETTING THINGS NEEDED FOR DAILY LIVING?: NO

## 2023-06-29 SDOH — ECONOMIC STABILITY: INCOME INSECURITY: IN THE LAST 12 MONTHS, WAS THERE A TIME WHEN YOU WERE NOT ABLE TO PAY THE MORTGAGE OR RENT ON TIME?: NO

## 2023-06-29 ASSESSMENT — ENCOUNTER SYMPTOMS
COUGH: 1
DIZZINESS: 1
WEAKNESS: 1
MYALGIAS: 1
NERVOUS/ANXIOUS: 1
SORE THROAT: 1

## 2023-06-29 ASSESSMENT — LIFESTYLE VARIABLES
HOW OFTEN DO YOU HAVE SIX OR MORE DRINKS ON ONE OCCASION: LESS THAN MONTHLY
HOW OFTEN DO YOU HAVE A DRINK CONTAINING ALCOHOL: 2-4 TIMES A MONTH
AUDIT-C TOTAL SCORE: 3
HOW MANY STANDARD DRINKS CONTAINING ALCOHOL DO YOU HAVE ON A TYPICAL DAY: 1 OR 2
SKIP TO QUESTIONS 9-10: 0

## 2023-06-29 ASSESSMENT — SOCIAL DETERMINANTS OF HEALTH (SDOH)
HOW OFTEN DO YOU GET TOGETHER WITH FRIENDS OR RELATIVES?: MORE THAN THREE TIMES A WEEK
DO YOU BELONG TO ANY CLUBS OR ORGANIZATIONS SUCH AS CHURCH GROUPS UNIONS, FRATERNAL OR ATHLETIC GROUPS, OR SCHOOL GROUPS?: NO
HOW OFTEN DO YOU ATTEND CHURCH OR RELIGIOUS SERVICES?: MORE THAN 4 TIMES PER YEAR
IN A TYPICAL WEEK, HOW MANY TIMES DO YOU TALK ON THE PHONE WITH FAMILY, FRIENDS, OR NEIGHBORS?: MORE THAN THREE TIMES A WEEK

## 2023-07-06 ENCOUNTER — OFFICE VISIT (OUTPATIENT)
Dept: FAMILY MEDICINE | Facility: CLINIC | Age: 43
End: 2023-07-06
Payer: COMMERCIAL

## 2023-07-06 VITALS
HEIGHT: 73 IN | OXYGEN SATURATION: 97 % | TEMPERATURE: 97.8 F | DIASTOLIC BLOOD PRESSURE: 80 MMHG | RESPIRATION RATE: 14 BRPM | BODY MASS INDEX: 29.95 KG/M2 | WEIGHT: 226 LBS | HEART RATE: 66 BPM | SYSTOLIC BLOOD PRESSURE: 119 MMHG

## 2023-07-06 DIAGNOSIS — Z00.00 ROUTINE GENERAL MEDICAL EXAMINATION AT A HEALTH CARE FACILITY: Primary | ICD-10-CM

## 2023-07-06 DIAGNOSIS — R00.2 PALPITATIONS: ICD-10-CM

## 2023-07-06 DIAGNOSIS — M62.838 MUSCLE SPASM: ICD-10-CM

## 2023-07-06 LAB
ALBUMIN SERPL BCG-MCNC: 4.3 G/DL (ref 3.5–5.2)
ALP SERPL-CCNC: 48 U/L (ref 40–129)
ALT SERPL W P-5'-P-CCNC: 19 U/L (ref 0–70)
ANION GAP SERPL CALCULATED.3IONS-SCNC: 11 MMOL/L (ref 7–15)
AST SERPL W P-5'-P-CCNC: 20 U/L (ref 0–45)
BILIRUB SERPL-MCNC: 0.4 MG/DL
BUN SERPL-MCNC: 13.9 MG/DL (ref 6–20)
CALCIUM SERPL-MCNC: 9.8 MG/DL (ref 8.6–10)
CHLORIDE SERPL-SCNC: 103 MMOL/L (ref 98–107)
CHOLEST SERPL-MCNC: 228 MG/DL
CREAT SERPL-MCNC: 0.95 MG/DL (ref 0.67–1.17)
DEPRECATED HCO3 PLAS-SCNC: 24 MMOL/L (ref 22–29)
ERYTHROCYTE [DISTWIDTH] IN BLOOD BY AUTOMATED COUNT: 12.1 % (ref 10–15)
GFR SERPL CREATININE-BSD FRML MDRD: >90 ML/MIN/1.73M2
GLUCOSE SERPL-MCNC: 101 MG/DL (ref 70–99)
HCT VFR BLD AUTO: 45.6 % (ref 40–53)
HDLC SERPL-MCNC: 39 MG/DL
HGB BLD-MCNC: 15.5 G/DL (ref 13.3–17.7)
LDLC SERPL CALC-MCNC: 139 MG/DL
MCH RBC QN AUTO: 30.7 PG (ref 26.5–33)
MCHC RBC AUTO-ENTMCNC: 34 G/DL (ref 31.5–36.5)
MCV RBC AUTO: 90 FL (ref 78–100)
NONHDLC SERPL-MCNC: 189 MG/DL
PLATELET # BLD AUTO: 232 10E3/UL (ref 150–450)
POTASSIUM SERPL-SCNC: 4.1 MMOL/L (ref 3.4–5.3)
PROT SERPL-MCNC: 7.4 G/DL (ref 6.4–8.3)
RBC # BLD AUTO: 5.05 10E6/UL (ref 4.4–5.9)
SODIUM SERPL-SCNC: 138 MMOL/L (ref 136–145)
TRIGL SERPL-MCNC: 251 MG/DL
WBC # BLD AUTO: 6.2 10E3/UL (ref 4–11)

## 2023-07-06 PROCEDURE — 99396 PREV VISIT EST AGE 40-64: CPT | Performed by: FAMILY MEDICINE

## 2023-07-06 PROCEDURE — 85027 COMPLETE CBC AUTOMATED: CPT | Performed by: FAMILY MEDICINE

## 2023-07-06 PROCEDURE — 99214 OFFICE O/P EST MOD 30 MIN: CPT | Mod: 25 | Performed by: FAMILY MEDICINE

## 2023-07-06 PROCEDURE — 36415 COLL VENOUS BLD VENIPUNCTURE: CPT | Performed by: FAMILY MEDICINE

## 2023-07-06 PROCEDURE — 80061 LIPID PANEL: CPT | Performed by: FAMILY MEDICINE

## 2023-07-06 PROCEDURE — 80053 COMPREHEN METABOLIC PANEL: CPT | Performed by: FAMILY MEDICINE

## 2023-07-06 RX ORDER — CYCLOBENZAPRINE HCL 5 MG
5 TABLET ORAL
Qty: 30 TABLET | Refills: 0 | Status: SHIPPED | OUTPATIENT
Start: 2023-07-06 | End: 2024-08-21

## 2023-07-06 ASSESSMENT — ANXIETY QUESTIONNAIRES
4. TROUBLE RELAXING: NOT AT ALL
3. WORRYING TOO MUCH ABOUT DIFFERENT THINGS: NOT AT ALL
IF YOU CHECKED OFF ANY PROBLEMS ON THIS QUESTIONNAIRE, HOW DIFFICULT HAVE THESE PROBLEMS MADE IT FOR YOU TO DO YOUR WORK, TAKE CARE OF THINGS AT HOME, OR GET ALONG WITH OTHER PEOPLE: SOMEWHAT DIFFICULT
7. FEELING AFRAID AS IF SOMETHING AWFUL MIGHT HAPPEN: SEVERAL DAYS
6. BECOMING EASILY ANNOYED OR IRRITABLE: NOT AT ALL
GAD7 TOTAL SCORE: 3
1. FEELING NERVOUS, ANXIOUS, OR ON EDGE: SEVERAL DAYS
2. NOT BEING ABLE TO STOP OR CONTROL WORRYING: SEVERAL DAYS
GAD7 TOTAL SCORE: 3
5. BEING SO RESTLESS THAT IT IS HARD TO SIT STILL: NOT AT ALL

## 2023-07-06 ASSESSMENT — ENCOUNTER SYMPTOMS
WEAKNESS: 1
DIZZINESS: 1
NERVOUS/ANXIOUS: 1
MYALGIAS: 1
SORE THROAT: 1
COUGH: 1

## 2023-07-06 ASSESSMENT — PATIENT HEALTH QUESTIONNAIRE - PHQ9
SUM OF ALL RESPONSES TO PHQ QUESTIONS 1-9: 3
SUM OF ALL RESPONSES TO PHQ QUESTIONS 1-9: 3
10. IF YOU CHECKED OFF ANY PROBLEMS, HOW DIFFICULT HAVE THESE PROBLEMS MADE IT FOR YOU TO DO YOUR WORK, TAKE CARE OF THINGS AT HOME, OR GET ALONG WITH OTHER PEOPLE: SOMEWHAT DIFFICULT

## 2023-07-06 NOTE — PATIENT INSTRUCTIONS
Preventive Health Recommendations  Male Ages 40 to 49    Yearly exam:             See your health care provider every year in order to  o   Review health changes.   o   Discuss preventive care.    o   Review your medicines if your doctor has prescribed any.    You should be tested each year for STDs (sexually transmitted diseases) if you re at risk.     Have a cholesterol test every 5 years.     Have a colonoscopy (test for colon cancer) if someone in your family has had colon cancer or polyps before age 50.     After age 45, have a diabetes test (fasting glucose). If you are at risk for diabetes, you should have this test every 3 years.      Talk with your health care provider about whether or not a prostate cancer screening test (PSA) is right for you.    Shots: Get a flu shot each year. Get a tetanus shot every 10 years.     Nutrition:    Eat at least 5 servings of fruits and vegetables daily.     Eat whole-grain bread, whole-wheat pasta and brown rice instead of white grains and rice.     Get adequate Calcium and Vitamin D.     Lifestyle    Exercise for at least 150 minutes a week (30 minutes a day, 5 days a week). This will help you control your weight and prevent disease.     Limit alcohol to one drink per day.     No smoking.     Wear sunscreen to prevent skin cancer.     See your dentist every six months for an exam and cleaning.       well

## 2023-07-06 NOTE — PROGRESS NOTES
SUBJECTIVE:   CC: Nahum is an 42 year old who presents for preventative health visit.       2023     2:16 PM   Additional Questions   Roomed by Junaid May   Accompanied by Self     Healthy Habits:     Getting at least 3 servings of Calcium per day:  NO    Bi-annual eye exam:  Yes    Dental care twice a year:  Yes    Sleep apnea or symptoms of sleep apnea:  Daytime drowsiness    Diet:  Regular (no restrictions)    Frequency of exercise:  1 day/week    Duration of exercise:  Less than 15 minutes    Taking medications regularly:  Yes    Medication side effects:  Muscle aches and Lightheadedness    Additional concerns today:  Yes    Patient has intermittent palpitations, no associated chest pain.    Today's PHQ-9 Score:       2023     9:09 AM   PHQ-9 SCORE   PHQ-9 Total Score MyChart 3 (Minimal depression)   PHQ-9 Total Score 3       Social History     Tobacco Use     Smoking status: Former     Packs/day: 1.00     Years: 13.00     Pack years: 13.00     Types: Cigarettes, Cigars, Dip, chew, snus or snuff     Start date: 1996     Quit date: 2009     Years since quittin.4     Smokeless tobacco: Never   Substance Use Topics     Alcohol use: Yes     Comment: minimal use           2023    11:46 AM   Alcohol Use   Prescreen: >3 drinks/day or >7 drinks/week? No       Last PSA: No results found for: PSA    Reviewed orders with patient. Reviewed health maintenance and updated orders accordingly - Yes  Lab work is in process  Labs reviewed in EPIC    Reviewed and updated as needed this visit by clinical staff   Tobacco  Allergies  Meds              Reviewed and updated as needed this visit by Provider                     Review of Systems   HENT: Positive for congestion and sore throat.    Respiratory: Positive for cough.    Musculoskeletal: Positive for myalgias.   Neurological: Positive for dizziness and weakness.   Psychiatric/Behavioral: The patient is nervous/anxious.          OBJECTIVE:   BP  "119/80 (BP Location: Right arm, Patient Position: Chair, Cuff Size: Adult Large)   Pulse 66   Temp 97.8  F (36.6  C) (Oral)   Resp 14   Ht 1.842 m (6' 0.5\")   Wt 102.5 kg (226 lb)   SpO2 97%   BMI 30.23 kg/m      Physical Exam  Vitals reviewed.   Constitutional:       General: He is not in acute distress.     Appearance: Normal appearance. He is not ill-appearing.   HENT:      Head: Normocephalic and atraumatic.      Right Ear: External ear normal.      Left Ear: External ear normal.      Nose: Nose normal.      Mouth/Throat:      Mouth: Mucous membranes are moist.      Pharynx: Oropharynx is clear.   Eyes:      General: No scleral icterus.        Right eye: No discharge.         Left eye: No discharge.      Extraocular Movements: Extraocular movements intact.      Pupils: Pupils are equal, round, and reactive to light.   Neck:      Vascular: No JVD.   Cardiovascular:      Rate and Rhythm: Normal rate and regular rhythm.   Pulmonary:      Effort: Pulmonary effort is normal. No respiratory distress.      Breath sounds: Normal breath sounds.   Abdominal:      General: Abdomen is flat. Bowel sounds are normal.      Palpations: Abdomen is soft.   Musculoskeletal:         General: No swelling.      Cervical back: Normal range of motion.      Comments: Reproducible tenderness over the trapezius   Lymphadenopathy:      Cervical: No cervical adenopathy.   Skin:     General: Skin is warm.      Capillary Refill: Capillary refill takes less than 2 seconds.   Neurological:      General: No focal deficit present.      Mental Status: He is alert.   Psychiatric:         Mood and Affect: Mood normal.         Behavior: Behavior normal.           Diagnostic Test Results:  Labs reviewed in Epic    ASSESSMENT/PLAN:   Nahum was seen today for physical.    Diagnoses and all orders for this visit:    Routine general medical examination at a health care facility  -     Comprehensive metabolic panel (BMP + Alb, Alk Phos, ALT, AST, " "Total. Bili, TP); Future  -     CBC with platelets; Future  -     Lipid panel reflex to direct LDL Fasting; Future  -     Comprehensive metabolic panel (BMP + Alb, Alk Phos, ALT, AST, Total. Bili, TP)  -     CBC with platelets  -     Lipid panel reflex to direct LDL Fasting    Palpitations  Check Zio patch, rule out with arrhythmias.  -     Adult Leadless EKG Monitor 3 to 7 Days; Future    Muscle spasms  No red flag symptoms to suggest cervical radiculopathy, start Flexeril and massage therapy.  -     cyclobenzaprine (FLEXERIL) 5 MG tablet; Take 1 tablet (5 mg) by mouth nightly as needed for muscle spasms    Other orders  -     REVIEW OF HEALTH MAINTENANCE PROTOCOL ORDERS              COUNSELING:   Reviewed preventive health counseling, as reflected in patient instructions       Regular exercise       Healthy diet/nutrition      BMI:   Estimated body mass index is 30.23 kg/m  as calculated from the following:    Height as of this encounter: 1.842 m (6' 0.5\").    Weight as of this encounter: 102.5 kg (226 lb).   Weight management plan: Discussed healthy diet and exercise guidelines      He reports that he quit smoking about 14 years ago. His smoking use included cigarettes, cigars, and dip, chew, snus or snuff. He started smoking about 26 years ago. He has a 13.00 pack-year smoking history. He has never used smokeless tobacco.            Roman Reilly MD  [facility]  Answers for HPI/ROS submitted by the patient on 7/6/2023  If you checked off any problems, how difficult have these problems made it for you to do your work, take care of things at home, or get along with other people?: Somewhat difficult  PHQ9 TOTAL SCORE: 3  SRINIVAS 7 TOTAL SCORE: 3      "

## 2023-07-18 ENCOUNTER — HOSPITAL ENCOUNTER (OUTPATIENT)
Dept: CARDIOLOGY | Facility: CLINIC | Age: 43
Discharge: HOME | End: 2023-07-18
Attending: FAMILY MEDICINE | Admitting: FAMILY MEDICINE
Payer: COMMERCIAL

## 2023-07-18 DIAGNOSIS — R00.2 PALPITATIONS: ICD-10-CM

## 2023-07-18 PROCEDURE — 93242 EXT ECG>48HR<7D RECORDING: CPT

## 2023-07-18 PROCEDURE — 93244 EXT ECG>48HR<7D REV&INTERPJ: CPT | Performed by: INTERNAL MEDICINE

## 2023-10-31 ENCOUNTER — TRANSFERRED RECORDS (OUTPATIENT)
Dept: HEALTH INFORMATION MANAGEMENT | Facility: CLINIC | Age: 43
End: 2023-10-31
Payer: COMMERCIAL

## 2023-12-04 ENCOUNTER — OFFICE VISIT (OUTPATIENT)
Dept: ORTHOPEDICS | Facility: CLINIC | Age: 43
End: 2023-12-04
Payer: COMMERCIAL

## 2023-12-04 ENCOUNTER — ANCILLARY PROCEDURE (OUTPATIENT)
Dept: GENERAL RADIOLOGY | Facility: CLINIC | Age: 43
End: 2023-12-04
Attending: FAMILY MEDICINE
Payer: COMMERCIAL

## 2023-12-04 VITALS
HEIGHT: 73 IN | BODY MASS INDEX: 29.16 KG/M2 | SYSTOLIC BLOOD PRESSURE: 110 MMHG | WEIGHT: 220 LBS | DIASTOLIC BLOOD PRESSURE: 82 MMHG

## 2023-12-04 DIAGNOSIS — M79.672 ACUTE PAIN OF LEFT FOOT: ICD-10-CM

## 2023-12-04 DIAGNOSIS — M79.672 ACUTE PAIN OF LEFT FOOT: Primary | ICD-10-CM

## 2023-12-04 DIAGNOSIS — M77.42 METATARSALGIA OF LEFT FOOT: ICD-10-CM

## 2023-12-04 PROCEDURE — 73630 X-RAY EXAM OF FOOT: CPT | Mod: LT | Performed by: FAMILY MEDICINE

## 2023-12-04 PROCEDURE — 99204 OFFICE O/P NEW MOD 45 MIN: CPT | Performed by: FAMILY MEDICINE

## 2023-12-04 RX ORDER — MELOXICAM 15 MG/1
15 TABLET ORAL DAILY
Qty: 30 TABLET | Refills: 1 | Status: SHIPPED | OUTPATIENT
Start: 2023-12-04 | End: 2024-08-21

## 2023-12-04 NOTE — LETTER
12/4/2023         RE: Nahum Chiu  [address] [address] [address]        Dear Colleague,    Thank you for referring your patient, Nahum Chiu, to the [facility] [place]. Please see a copy of my visit note below.    ASSESSMENT & PLAN  Patient Instructions     1. Acute pain of left foot    2. Metatarsalgia of left foot      -Patient has left foot pain due to inflammation of the second metatarsophalangeal joint  -Patient will start meloxicam 15 mg daily for the next 2 weeks and then on an as-needed basis as pain improves  -Patient will start formal physical therapy and home exercise program  -An order was placed for custom orthotics  -Patient will follow-up if pain does not improve  -Call direct clinic number [[phone]] at any time with questions or concerns.    Albert Yeo MD [facility]  [facility] [place]        -----    SUBJECTIVE  Nahum Chiu is a/an 43 year old male who is seen as a self referral for evaluation of left foot pain. The patient is seen by themselves.    Onset: 3-4 week(s) ago. Reports insidious onset without acute precipitating event.  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       (gastroesophageal reflux disease)      Social History     Socioeconomic History     Marital status:      Spouse name: Moi     Number of children: 2   Occupational History     Occupation:    Tobacco Use     Smoking status: Former     Packs/day: 1.00     Years: 13.00     Additional pack years: 0.00     Total pack years: 13.00     Types: Cigarettes, Cigars, Dip, chew, snus or snuff     Start date: 1996     Quit date: 2009     Years since quittin.8     Smokeless tobacco: Never   Vaping Use     Vaping Use: Never used   Substance and Sexual Activity     Alcohol use: Yes     Comment: minimal use     Drug use: No     Sexual activity: Yes     Partners: Female     Birth control/protection: Condom   Other Topics Concern     Parent/sibling w/ CABG, MI or angioplasty before 65F 55M? No     Social Determinants of Health     Financial Resource Strain: Low Risk  (2023)    Overall Financial Resource Strain (CARDIA)      Difficulty of Paying Living Expenses: Not hard at all   Food Insecurity: No Food Insecurity (2023)    Hunger Vital Sign      Worried About Running Out of Food in the Last Year: Never true      Ran Out of Food in the Last Year: Never true   Transportation Needs: No Transportation Needs (2023)    PRAPARE - Transportation      Lack of Transportation (Medical): No      Lack of Transportation (Non-Medical): No   Physical Activity: Insufficiently Active (2023)    Exercise Vital Sign      Days of Exercise per Week: 3 days      Minutes of Exercise per Session: 20 min   Stress: Stress Concern Present (2023)    [employer] [place] [employer] - Occupational Stress Questionnaire      Feeling of Stress : To some extent   Social Connections: Moderately Integrated (2023)    Social Connection and Isolation Panel [NHANES]      Frequency of Communication with Friends and Family: More than three times a week      Frequency of Social Gatherings with Friends and Family:  "More than three times a week      Attends [employer] Services: More than 4 times per year      Active Member of Clubs or Organizations: No      Marital Status:    Housing Stability: Low Risk  (6/29/2023)    Housing Stability Vital Sign      Unable to Pay for Housing in the Last Year: No      Number of Places Lived in the Last Year: 1      Unstable Housing in the Last Year: No         Patient's past medical, surgical, social, and family histories were reviewed today and no changes are noted.    REVIEW OF SYSTEMS:  10 point ROS is negative other than symptoms noted above in HPI, Past Medical History or as stated below  Constitutional: NEGATIVE for fever, chills, change in weight  Skin: NEGATIVE for worrisome rashes, moles or lesions  GI/: NEGATIVE for bowel or bladder changes  Neuro: NEGATIVE for weakness, dizziness or paresthesias    OBJECTIVE:  /82   Ht 1.842 m (6' 0.5\")   Wt 99.8 kg (220 lb)   BMI 29.43 kg/m     General: healthy, alert and in no distress  HEENT: no scleral icterus or conjunctival erythema  Skin: no suspicious lesions or rash. No jaundice.  CV:  no pedal edema  Resp: normal respiratory effort without conversational dyspnea   Psych: normal mood and affect  Gait: normal steady gait with appropriate coordination and balance  Neuro: Normal light sensory exam of lower extremity  MSK:  LEFT FOOT  Inspection:    no swelling or ecchymosis  Palpation:    Tender about the 2nd MTP joints. Otherwise remainder of bony/ligamentous landmarks are non-tender.  Range of Motion:     Grossly intact and non-painful  Strength:    Grossly intact in all planes  Special Tests:    Positive: MTP stress      Independent visualization of the below image:  Recent Results (from the past 24 hour(s))   XR Foot Left G/E 3 Views    Narrative    No acute fracture, dislocation or osseous abnormalities.           Albert Yeo MD [facility]      Again, thank you for allowing me to " participate in the care of your patient.        Sincerely,        Albert Yeo, MD

## 2023-12-04 NOTE — PROGRESS NOTES
ASSESSMENT & PLAN  Patient Instructions     1. Acute pain of left foot    2. Metatarsalgia of left foot      -Patient has left foot pain due to inflammation of the second metatarsophalangeal joint  -Patient will start meloxicam 15 mg daily for the next 2 weeks and then on an as-needed basis as pain improves  -Patient will start formal physical therapy and home exercise program  -An order was placed for custom orthotics  -Patient will follow-up if pain does not improve  -Call direct clinic number [[phone]] at any time with questions or concerns.    Albert Yeo MD [facility]  [facility]        -----    SUBJECTIVE  Nahum Chiu is a/an 43 year old male who is seen as a self referral for evaluation of left foot pain. The patient is seen by themselves.    Onset: 3-4 week(s) ago. Reports insidious onset without acute precipitating event.  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    Past Medical History:   Diagnosis Date    Anxiety 12/2021    on medication in 20's but not for a long time - seeing therapist now    Environmental allergies     GERD (gastroesophageal reflux disease)      Social History     Socioeconomic History    Marital status:      Spouse name: Moi    Number of children: 2   Occupational History    Occupation:    Tobacco Use    Smoking status: Former     Packs/day: 1.00     Years: 13.00      Additional pack years: 0.00     Total pack years: 13.00     Types: Cigarettes, Cigars, Dip, chew, snus or snuff     Start date: 1996     Quit date: 2009     Years since quittin.8    Smokeless tobacco: Never   Vaping Use    Vaping Use: Never used   Substance and Sexual Activity    Alcohol use: Yes     Comment: minimal use    Drug use: No    Sexual activity: Yes     Partners: Female     Birth control/protection: Condom   Other Topics Concern    Parent/sibling w/ CABG, MI or angioplasty before 65F 55M? No     Social Determinants of Health     Financial Resource Strain: Low Risk  (2023)    Overall Financial Resource Strain (CARDIA)     Difficulty of Paying Living Expenses: Not hard at all   Food Insecurity: No Food Insecurity (2023)    Hunger Vital Sign     Worried About Running Out of Food in the Last Year: Never true     Ran Out of Food in the Last Year: Never true   Transportation Needs: No Transportation Needs (2023)    PRAPARE - Transportation     Lack of Transportation (Medical): No     Lack of Transportation (Non-Medical): No   Physical Activity: Insufficiently Active (2023)    Exercise Vital Sign     Days of Exercise per Week: 3 days     Minutes of Exercise per Session: 20 min   Stress: Stress Concern Present (2023)    [employer] [employer] - Occupational Stress Questionnaire     Feeling of Stress : To some extent   Social Connections: Moderately Integrated (2023)    Social Connection and Isolation Panel [NHANES]     Frequency of Communication with Friends and Family: More than three times a week     Frequency of Social Gatherings with Friends and Family: More than three times a week     Attends [employer] Services: More than 4 times per year     Active Member of Clubs or Organizations: No     Marital Status:    Housing Stability: Low Risk  (2023)    Housing Stability Vital Sign     Unable to Pay for Housing in the Last Year: No     Number  "of Places Lived in the Last Year: 1     Unstable Housing in the Last Year: No         Patient's past medical, surgical, social, and family histories were reviewed today and no changes are noted.    REVIEW OF SYSTEMS:  10 point ROS is negative other than symptoms noted above in HPI, Past Medical History or as stated below  Constitutional: NEGATIVE for fever, chills, change in weight  Skin: NEGATIVE for worrisome rashes, moles or lesions  GI/: NEGATIVE for bowel or bladder changes  Neuro: NEGATIVE for weakness, dizziness or paresthesias    OBJECTIVE:  /82   Ht 1.842 m (6' 0.5\")   Wt 99.8 kg (220 lb)   BMI 29.43 kg/m     General: healthy, alert and in no distress  HEENT: no scleral icterus or conjunctival erythema  Skin: no suspicious lesions or rash. No jaundice.  CV:  no pedal edema  Resp: normal respiratory effort without conversational dyspnea   Psych: normal mood and affect  Gait: normal steady gait with appropriate coordination and balance  Neuro: Normal light sensory exam of lower extremity  MSK:  LEFT FOOT  Inspection:    no swelling or ecchymosis  Palpation:    Tender about the 2nd MTP joints. Otherwise remainder of bony/ligamentous landmarks are non-tender.  Range of Motion:     Grossly intact and non-painful  Strength:    Grossly intact in all planes  Special Tests:    Positive: MTP stress      Independent visualization of the below image:  Recent Results (from the past 24 hour(s))   XR Foot Left G/E 3 Views    Narrative    No acute fracture, dislocation or osseous abnormalities.           Albert Yeo MD [facility]    "

## 2023-12-04 NOTE — PATIENT INSTRUCTIONS
1. Acute pain of left foot    2. Metatarsalgia of left foot      -Patient has left foot pain due to inflammation of the second metatarsophalangeal joint  -Patient will start meloxicam 15 mg daily for the next 2 weeks and then on an as-needed basis as pain improves  -Patient will start formal physical therapy and home exercise program  -An order was placed for custom orthotics  -Patient will follow-up if pain does not improve  -Call direct clinic number [[phone]] at any time with questions or concerns.    Albert Yeo MD [facility]  [facility] [place]

## 2023-12-13 ENCOUNTER — THERAPY VISIT (OUTPATIENT)
Dept: PHYSICAL THERAPY | Facility: CLINIC | Age: 43
End: 2023-12-13
Attending: FAMILY MEDICINE
Payer: COMMERCIAL

## 2023-12-13 DIAGNOSIS — M77.42 METATARSALGIA OF LEFT FOOT: ICD-10-CM

## 2023-12-13 PROCEDURE — 97110 THERAPEUTIC EXERCISES: CPT | Mod: GP | Performed by: PHYSICAL THERAPIST

## 2023-12-13 PROCEDURE — 97035 APP MDLTY 1+ULTRASOUND EA 15: CPT | Mod: GP | Performed by: PHYSICAL THERAPIST

## 2023-12-13 PROCEDURE — 97161 PT EVAL LOW COMPLEX 20 MIN: CPT | Mod: GP | Performed by: PHYSICAL THERAPIST

## 2023-12-13 NOTE — PROGRESS NOTES
PHYSICAL THERAPY EVALUATION  Type of Visit: Evaluation  Onset of pain under the 2nd and 3rd metatarsal heads of the left foot 2023 after a father / daughter soccer match. Pt referred by MD for physical therapy on 23  See electronic medical record for Abuse and Falls Screening details.    Subjective       Presenting condition or subjective complaint: having pain in the ball of my left foot- Metarsalgia ?  Date of onset:      Relevant medical history: Depression; Smoking   Dates & types of surgery: hernial  adenoidectomy , sinus surgery     Prior diagnostic imaging/testing results: X-ray     Prior therapy history for the same diagnosis, illness or injury: No      Prior Level of Function  Transfers: Independent  Ambulation: Independent  ADL: Independent  IADL: Childcare, Driving, Housekeeping, Work, Yard work    Living Environment  Social support: With family members   Type of home: 2-story   Stairs to enter the home:         Ramp:     Stairs inside the home:         Help at home: None  Equipment owned:       Employment:   [employer]  Hobbies/Interests: golf, relaxing, vacations    Patient goals for therapy: exercise, walk without pain ( mild )    Pain assessment: Pain present  Location: left foot /Ratin/10     Objective   ANKLE:    Standing Posture: increased pronation    Gait: decreased functional dorsiflexion left foot     SL Balance:   L: good  sec (R hip drop)  R: good sec (L hip drop)    ROM/Strength: (* indicates patient's pain)   AROM L AROM R MMT L MMT R   Dorsiflexion 5  5/10    Plantarflexion 50  SL heel raises 5/10 SL heel raises /10   Inversion (prone)   5/10    Eversion (prone)   5/10    G Toe Ext       G Toe Flex         Other Tests:  -Closed Chain Dorsiflexion:       R= cm ; L= cm    Edema:    General:   Figure 8: L:  ; R:     Palpation: 2nd and 3rd metatarsal heads left foot    Ankle/Foot Mobilizations (hyper vs hypo):   - Talocrual:   - Subtalar:   - Talonavicular:   -  Calcaneocuboid:   - Cubonavicular-cuniform:     Special Tests:   L R   Anterior Drawer     Posterior Drawer     Valgus Stress     Varus Stress     External Rotation     Burnette's (Achilles)     Calcaneal tap     Squeeze test     Windlass Test     Destiny's (DVT)     Decreased flexibility left gastrocnemius and soleus       Assessment & Plan   CLINICAL IMPRESSIONS  Medical Diagnosis: left foot metarsalgia    Treatment Diagnosis: left foot pain, decreased mobility and strength   Impression/Assessment: Patient is a 43 year old male with left foot  complaints.  The following significant findings have been identified: Pain, Decreased ROM/flexibility, and Decreased strength. These impairments interfere with their ability to perform self care tasks, work tasks, recreational activities, household chores, driving , household mobility, and community mobility as compared to previous level of function.     Clinical Decision Making (Complexity):  Clinical Presentation: Stable/Uncomplicated  Clinical Presentation Rationale: based on medical and personal factors listed in PT evaluation  Clinical Decision Making (Complexity): Low complexity    PLAN OF CARE  Treatment Interventions:  Modalities: Cryotherapy, Ultrasound  Interventions: Therapeutic Exercise    Long Term Goals     PT Goal 1  Goal Identifier: standing  Goal Description: pt able to stand for 1 hour pain level 1  Rationale: to maximize safety and independence with performance of ADLs and functional tasks;to maximize safety and independence within the home;to maximize safety and independence within the community;to maximize safety and independence with transportation;to maximize safety and independence with self cares  Target Date: 02/14/24      Frequency of Treatment: 1x/week  Duration of Treatment: 6 weeks    Recommended Referrals to Other Professionals:   Education Assessment:   Learner/Method: No Barriers to Learning    Risks and benefits of evaluation/treatment have  been explained.   Patient/Family/caregiver agrees with Plan of Care.     Evaluation Time:             Signing Clinician: Kendell Rojas PT

## 2023-12-21 ENCOUNTER — THERAPY VISIT (OUTPATIENT)
Dept: PHYSICAL THERAPY | Facility: CLINIC | Age: 43
End: 2023-12-21
Payer: COMMERCIAL

## 2023-12-21 DIAGNOSIS — M77.42 METATARSALGIA OF LEFT FOOT: Primary | ICD-10-CM

## 2023-12-21 PROCEDURE — 97110 THERAPEUTIC EXERCISES: CPT | Mod: GP | Performed by: PHYSICAL THERAPIST

## 2023-12-21 PROCEDURE — 97035 APP MDLTY 1+ULTRASOUND EA 15: CPT | Mod: GP | Performed by: PHYSICAL THERAPIST

## 2024-01-03 ENCOUNTER — THERAPY VISIT (OUTPATIENT)
Dept: PHYSICAL THERAPY | Facility: CLINIC | Age: 44
End: 2024-01-03
Payer: COMMERCIAL

## 2024-01-03 DIAGNOSIS — M79.672 LEFT FOOT PAIN: Primary | ICD-10-CM

## 2024-01-03 PROCEDURE — 97110 THERAPEUTIC EXERCISES: CPT | Mod: GP | Performed by: PHYSICAL THERAPIST

## 2024-01-03 PROCEDURE — 97035 APP MDLTY 1+ULTRASOUND EA 15: CPT | Mod: GP | Performed by: PHYSICAL THERAPIST

## 2024-01-17 ENCOUNTER — THERAPY VISIT (OUTPATIENT)
Dept: PHYSICAL THERAPY | Facility: CLINIC | Age: 44
End: 2024-01-17
Payer: COMMERCIAL

## 2024-01-17 DIAGNOSIS — M77.40 METATARSALGIA, UNSPECIFIED LATERALITY: Primary | ICD-10-CM

## 2024-01-17 PROCEDURE — 97110 THERAPEUTIC EXERCISES: CPT | Mod: GP | Performed by: PHYSICAL THERAPIST

## 2024-01-17 PROCEDURE — 97035 APP MDLTY 1+ULTRASOUND EA 15: CPT | Mod: GP | Performed by: PHYSICAL THERAPIST

## 2024-02-01 ENCOUNTER — THERAPY VISIT (OUTPATIENT)
Dept: PHYSICAL THERAPY | Facility: CLINIC | Age: 44
End: 2024-02-01
Payer: COMMERCIAL

## 2024-02-01 DIAGNOSIS — M79.672 LEFT FOOT PAIN: Primary | ICD-10-CM

## 2024-02-01 PROCEDURE — 97035 APP MDLTY 1+ULTRASOUND EA 15: CPT | Mod: GP | Performed by: PHYSICAL THERAPIST

## 2024-02-01 PROCEDURE — 97140 MANUAL THERAPY 1/> REGIONS: CPT | Mod: GP | Performed by: PHYSICAL THERAPIST

## 2024-02-01 PROCEDURE — 97110 THERAPEUTIC EXERCISES: CPT | Mod: GP | Performed by: PHYSICAL THERAPIST

## 2024-02-05 DIAGNOSIS — M77.42 METATARSALGIA OF LEFT FOOT: ICD-10-CM

## 2024-02-05 NOTE — LETTER
February 13, 2024      Nahum Chiu  [address] [address] [id number]        Dear Nahum,     We have made 3 attempts to contact you regarding a refill request for Meloxicam 15mg from [facility].   Please contact the clinic for further information if you are still needing this medication refilled.     Sincerely,        Albert Yeo, MD

## 2024-02-05 NOTE — TELEPHONE ENCOUNTER
Medication Request for: Meloxicam 15mg            Prescription last written on 12/4/23 by Dr. Yeo    Sig: take 1 tab daily   Last Fill Quantity: 30 with # refills: 1     Last Office Visit by provider: 12/4/23    [employer] message sent to patient.     AZEB Mijares RN

## 2024-02-07 NOTE — TELEPHONE ENCOUNTER
Patient has not read [employer] message.   There is no consent to communicate on file.   Left voicemail asking for a return call.  number was provided.     AZEB Mijares RN

## 2024-02-13 RX ORDER — MELOXICAM 15 MG/1
15 TABLET ORAL DAILY
Qty: 30 TABLET | Refills: 1 | OUTPATIENT
Start: 2024-02-13

## 2024-02-13 NOTE — TELEPHONE ENCOUNTER
Patient read [employer] message but has not responded. Letter sent.   Closing encounter.     AZEB Mijares RN

## 2024-02-14 ENCOUNTER — THERAPY VISIT (OUTPATIENT)
Dept: PHYSICAL THERAPY | Facility: CLINIC | Age: 44
End: 2024-02-14
Payer: COMMERCIAL

## 2024-02-14 DIAGNOSIS — M77.42 METATARSALGIA OF LEFT FOOT: Primary | ICD-10-CM

## 2024-02-14 PROCEDURE — 97140 MANUAL THERAPY 1/> REGIONS: CPT | Mod: GP | Performed by: PHYSICAL THERAPIST

## 2024-02-14 PROCEDURE — 97110 THERAPEUTIC EXERCISES: CPT | Mod: GP | Performed by: PHYSICAL THERAPIST

## 2024-02-14 PROCEDURE — 97035 APP MDLTY 1+ULTRASOUND EA 15: CPT | Mod: GP | Performed by: PHYSICAL THERAPIST

## 2024-02-14 NOTE — PROGRESS NOTES
DISCHARGE  Reason for Discharge: Patient has met all goals.    Equipment Issued: theraband    Discharge Plan: Patient to continue home program.    Referring Provider:  Albert Yeo

## 2024-02-19 DIAGNOSIS — F41.1 GENERALIZED ANXIETY DISORDER: ICD-10-CM

## 2024-02-19 RX ORDER — VENLAFAXINE HYDROCHLORIDE 37.5 MG/1
37.5 CAPSULE, EXTENDED RELEASE ORAL DAILY
Qty: 90 CAPSULE | Refills: 0 | Status: SHIPPED | OUTPATIENT
Start: 2024-02-19 | End: 2024-07-25

## 2024-02-26 ENCOUNTER — MYC MEDICAL ADVICE (OUTPATIENT)
Dept: FAMILY MEDICINE | Facility: CLINIC | Age: 44
End: 2024-02-26
Payer: COMMERCIAL

## 2024-06-06 ENCOUNTER — PATIENT OUTREACH (OUTPATIENT)
Dept: CARE COORDINATION | Facility: CLINIC | Age: 44
End: 2024-06-06
Payer: COMMERCIAL

## 2024-06-20 ENCOUNTER — PATIENT OUTREACH (OUTPATIENT)
Dept: CARE COORDINATION | Facility: CLINIC | Age: 44
End: 2024-06-20
Payer: COMMERCIAL

## 2024-07-01 NOTE — PROGRESS NOTES
[facility]                                     Progress Note    Patient Name: Nahum Chiu  Date: 22         Service Type: Individual      Session Start Time: 2:11 pm  Session End Time: 2:44 pm     Session Length: 33 minutes    Session #: 2    Attendees: Client attended alone    Service Modality:  Video Visit:      Provider verified identity through the following two step process.  Patient provided:  Patient  and Patient address    Telemedicine Visit: The patient's condition can be safely assessed and treated via synchronous audio and visual telemedicine encounter.      Reason for Telemedicine Visit: Services only offered telehealth    Originating Site (Patient Location): Patient's home (not moving)    Distant Site (Provider Location): Provider Remote Setting- Home Office    Consent:  The patient/guardian has verbally consented to: the potential risks and benefits of telemedicine (video visit) versus in person care; bill my insurance or make self-payment for services provided; and responsibility for payment of non-covered services.     Patient would like the video invitation sent by:  My Chart    Mode of Communication:  Video Conference via [employer]    As the provider I attest to compliance with applicable laws and regulations related to telemedicine.    DATA  Interactive Complexity: No  Crisis: No        Progress Since Last Session (Related to Symptoms / Goals / Homework):   Symptoms: Improving symptoms, endorsing reduction in worry    Homework: Achieved / completed to satisfaction      Episode of Care Goals: Satisfactory progress - PREPARATION (Decided to change - considering how); Intervened by negotiating a change plan and determining options / strategies for behavior change, identifying triggers, exploring social supports, and working towards setting a date to begin behavior change     Current / Ongoing Stressors and Concerns:   Pt is currently seeking therapy due to heightened  "anxiety related to medical/health and recent memory concerns. Pt was transferred to writer due to his regular therapist going on leave. Since last session, pt reported that things have been overall less stressful and had gone on vacation with gave him a chance to relax. Pt reported that his recent colonoscopy came back with a clear bill of health, and that his neurology appointment went well as well, with no concerns for his memory. Pt reported that he has been trying to use the \"maybe it will, maybe it won't\" mantra, as opposed to the \"it is\" thinking. Pt also saw his PCP which reaffirmed what the other providers had said previously and encouraged him to just maintain his current medication regimen. Pt processed through how his stressors were likely a contributor to his recent anxiety spike and how he has worked on changing his mindset to not feed into his anxiety. Pt reported that since getting back from vacation, he has restarted working out which has been helping his mood. Pt reviewed what he can do in the future if he starts to have health related concerns, primarily no jumping to conclusion and waiting the worries out. Pt also identified needing to take time for himself and that he should try to use his coping skills more when he is in a better place. Pt also recognizes that the meds are helping, and that he is planning to continue taking it.      Treatment Objective(s) Addressed in This Session:   identify health related thoughts, concerns, sensation, and  stressors which contribute to feelings of anxiety  use cognitive strategies identified in therapy to challenge anxious thoughts  Identify negative self-talk and behaviors: challenge core beliefs, myths, and actions  learn at least 3 self-regulation strategies     Intervention:   CBT: Review how recent changes in thinking have helped better manage his health anxiety.    Motivational Interviewing    MI Intervention: Expressed Empathy/Understanding, Supported " Autonomy, Collaboration, Evocation, Open-ended questions, Reflections: simple and complex and Reframe     Change Talk Expressed by the Patient: Desire to change Ability to change Need to change Committment to change Activation Taking steps    Provider Response to Change Talk: E - Evoked more info from patient about behavior change, A - Affirmed patient's thoughts, decisions, or attempts at behavior change, R - Reflected patient's change talk and S - Summarized patient's change talk statements    Psychodynamic: Processed through internal-experiences related to past and current health concerns  Solution Focused: Identified ways in which his current symptoms impact his ability to reach his goals    Assessments completed prior to visit:  PHQ2:   PHQ-2 ( 1999 [employer]) 7/24/2022 6/29/2022 6/22/2022 2/11/2022 2/11/2022 1/18/2022 12/18/2021   Q1: Little interest or pleasure in doing things 0 1 1 2 2 0 3   Q2: Feeling down, depressed or hopeless 1 1 1 1 1 0 3   PHQ-2 Score 1 2 2 3 3 0 6   PHQ-2 Total Score (12-17 Years)- Positive if 3 or more points; Administer PHQ-A if positive - - - - - - -   Q1: Little interest or pleasure in doing things Not at all Several days Several days More than half the days - - -   Q2: Feeling down, depressed or hopeless Several days Several days Several days Several days - - -   PHQ-2 Score 1 2 2 3 - - -     PHQ9:   PHQ-9 SCORE 12/18/2021 12/29/2021 2/11/2022 2/11/2022   PHQ-9 Total Score MyChart - - - 3 (Minimal depression)   PHQ-9 Total Score 17 9 3 3     GAD2:   SRINIVAS-2 12/29/2021 12/29/2021 6/22/2022   Feeling nervous, anxious, or on edge 2 2 2   Not being able to stop or control worrying 2 2 2   SRINIVAS-2 Total Score 4 4 4     GAD7:   SRINIVAS-7 SCORE 12/18/2021 12/29/2021 12/29/2021 6/7/2022 6/22/2022 7/24/2022   Total Score - - 6 (mild anxiety) - 7 (mild anxiety) 5 (mild anxiety)   Total Score 8 6 6 11 7 5     PROMIS 10-Global Health (only subscores and total score):   PROMIS-10 Scores Only 12/29/2021  "12/29/2021 6/22/2022   Global Mental Health Score 12 12 9   Global Physical Health Score 15 15 15   PROMIS TOTAL - SUBSCORES 27 27 24         ASSESSMENT: Current Emotional / Mental Status (status of significant symptoms):   Risk status (Self / Other harm or suicidal ideation)   Patient denies current fears or concerns for personal safety.   Patient denies current or recent suicidal ideation or behaviors.   Patient denies current or recent homicidal ideation or behaviors.   Patient denies current or recent self injurious behavior or ideation.   Patient denies other safety concerns.   Patient reports there has been no change in risk factors since their last session.     Patient reports there has been no change in protective factors since their last session.     Recommended that patient call 911 or go to the local ED should there be a change in any of these risk factors.     Appearance:   Appropriate    Eye Contact:   Good    Psychomotor Behavior: Normal    Attitude:   Cooperative  Interested Pleasant   Orientation:   All   Speech    Rate / Production: Normal/ Responsive    Volume:  Normal    Mood:    Anxious  Normal   Affect:    Appropriate    Thought Content:  Clear    Thought Form:  Coherent  Logical    Insight:    Good  and Intellectual Insight     Medication Review:   No changes to current psychiatric medication(s)     Medication Compliance:   Yes     Changes in Health Issues:   None reported (Pt continues to have concerns related to memory)     Chemical Use Review:   Substance Use: Chemical use reviewed, no active concerns identified      Tobacco Use: No current tobacco use.      Diagnosis:  1. Other specified anxiety disorders        Collateral Reports Completed:   Not Applicable    PLAN: (Patient Tasks / Therapist Tasks / Other)  Continue with morning work out routine 4 days a week  Practice meditation once or twice a week and take time for yourself  Embrace uncertainty with \"Maybe, maybe not, I'll never " "know\"  Wait out the worry    Roberto Reza [facility]                                                         ______________________________________________________________________    Individual Treatment Plan    Patient's Name: Nahum Chiu  YOB: 1980    Date of Creation: 1/5/22  Date Treatment Plan Last Reviewed/Revised: 6/7/2022; next review due 9/7/22    DSM5 Diagnoses: 300.02 (F41.1) Generalized Anxiety Disorder  Psychosocial / Contextual Factors: Patient is a 41 year old male who works full time and lives with wife and 2 kids.    PROMIS (reviewed every 90 days):   PROMIS 10-Global Health (only subscores and total score):   PROMIS-10 Scores Only 12/29/2021 12/29/2021 6/22/2022   Global Mental Health Score 12 12 9   Global Physical Health Score 15 15 15   PROMIS TOTAL - SUBSCORES 27 27 24       Referral / Collaboration:  Referral to another professional/service is not indicated at this time..    Anticipated number of session for this episode of care: 8-13  Anticipation frequency of session: Biweekly  Anticipated Duration of each session: 38-52 minutes  Treatment plan will be reviewed in 90 days or when goals have been changed.       MeasurableTreatment Goal(s) related to diagnosis / functional impairment(s)  Goal- Anxiety: Patient will reduce overall frequency, intensity, and duration of the anxiety so that daily functioning is not impaired.     I will know I've met my goal when I know what to do with the anxiety that I feel about health.       Objective #A (Client Action)  Patient will describe situations, thoughts, feelings, and actions associated with anxieties and worries, their impact on functioning and attempts to resolve them. Patient will do this 4 out of 7 days of the week.      Intervention(s)  Therapist will provide psychoeducation, behavioral activation, and cognitive restructuring.  Status: Continued - Date: 1/5/2022; 6/7/2022        Objective #B  Patient will use cognitive strategies " identified in therapy to challenge negative thought patterns 90% of the time.     Intervention(s)  Therapist will provide psychoeducation, behavioral activation, and cognitive restructuring.  Status: Continued - Date: 1/5/2022; 6/7/2022     Objective #C  Patient will identify and use at least three coping skills and distraction and diversion activities to manage feelings of anxiety. Pt will use these skills at least three times per week.     Intervention(s)  Therapist will provide psychoeducation, behavioral activation, and cognitive restructuring.  Status: Continued - Date: 1/5/2022; 6/7/2022        Patient has reviewed and agreed to the above plan.        IBRAHIMA DE LA GARZA, [facility]                         1/5/22; 6/7/2022   Not applicable

## 2024-07-25 DIAGNOSIS — F41.1 GENERALIZED ANXIETY DISORDER: ICD-10-CM

## 2024-07-26 DIAGNOSIS — F41.1 GENERALIZED ANXIETY DISORDER: ICD-10-CM

## 2024-07-26 RX ORDER — VENLAFAXINE HYDROCHLORIDE 37.5 MG/1
37.5 CAPSULE, EXTENDED RELEASE ORAL DAILY
Qty: 30 CAPSULE | Refills: 0 | Status: SHIPPED | OUTPATIENT
Start: 2024-07-26 | End: 2024-07-30

## 2024-07-26 NOTE — TELEPHONE ENCOUNTER
Per pharmacy this medication needs to be dispensed as a 90 day supply per insurance requirements.  Please provide additional refills if appropriate.

## 2024-07-29 RX ORDER — VENLAFAXINE HYDROCHLORIDE 37.5 MG/1
37.5 CAPSULE, EXTENDED RELEASE ORAL DAILY
Qty: 30 CAPSULE | Refills: 0 | OUTPATIENT
Start: 2024-07-29

## 2024-07-30 DIAGNOSIS — F41.1 GENERALIZED ANXIETY DISORDER: ICD-10-CM

## 2024-07-30 RX ORDER — VENLAFAXINE HYDROCHLORIDE 37.5 MG/1
37.5 CAPSULE, EXTENDED RELEASE ORAL DAILY
Qty: 90 CAPSULE | Refills: 0 | Status: SHIPPED | OUTPATIENT
Start: 2024-07-30 | End: 2024-08-21

## 2024-07-30 NOTE — TELEPHONE ENCOUNTER
This Rx for venlafaxine cannot be ordered.    Please put in a new Rx for an alternative for a 90 day supply

## 2024-07-30 NOTE — TELEPHONE ENCOUNTER
Previous refill encounters are requiring 90 day supply of medication. Refills have been denied recently for this med.     Looks like patient is overdue to be seen, but has OV sent up for 08/21/2024.   LAst OV - 07/06/2024.    Will route to primary care provider to advise on refill request for 90 days.

## 2024-08-21 ENCOUNTER — OFFICE VISIT (OUTPATIENT)
Dept: FAMILY MEDICINE | Facility: CLINIC | Age: 44
End: 2024-08-21
Payer: COMMERCIAL

## 2024-08-21 VITALS
TEMPERATURE: 98.5 F | WEIGHT: 198 LBS | HEIGHT: 73 IN | BODY MASS INDEX: 26.24 KG/M2 | HEART RATE: 59 BPM | DIASTOLIC BLOOD PRESSURE: 69 MMHG | RESPIRATION RATE: 14 BRPM | SYSTOLIC BLOOD PRESSURE: 108 MMHG | OXYGEN SATURATION: 96 %

## 2024-08-21 DIAGNOSIS — Z00.00 ROUTINE GENERAL MEDICAL EXAMINATION AT A HEALTH CARE FACILITY: Primary | ICD-10-CM

## 2024-08-21 PROBLEM — M77.42 METATARSALGIA OF LEFT FOOT: Status: RESOLVED | Noted: 2023-12-13 | Resolved: 2024-08-21

## 2024-08-21 PROBLEM — T78.40XA ALLERGIC REACTION, INITIAL ENCOUNTER: Status: RESOLVED | Noted: 2020-08-17 | Resolved: 2024-08-21

## 2024-08-21 PROCEDURE — 80061 LIPID PANEL: CPT | Performed by: FAMILY MEDICINE

## 2024-08-21 PROCEDURE — 36415 COLL VENOUS BLD VENIPUNCTURE: CPT | Performed by: FAMILY MEDICINE

## 2024-08-21 PROCEDURE — 99396 PREV VISIT EST AGE 40-64: CPT | Performed by: FAMILY MEDICINE

## 2024-08-21 SDOH — HEALTH STABILITY: PHYSICAL HEALTH: ON AVERAGE, HOW MANY DAYS PER WEEK DO YOU ENGAGE IN MODERATE TO STRENUOUS EXERCISE (LIKE A BRISK WALK)?: 4 DAYS

## 2024-08-21 SDOH — HEALTH STABILITY: PHYSICAL HEALTH: ON AVERAGE, HOW MANY MINUTES DO YOU ENGAGE IN EXERCISE AT THIS LEVEL?: 40 MIN

## 2024-08-21 ASSESSMENT — ANXIETY QUESTIONNAIRES
GAD7 TOTAL SCORE: 0
3. WORRYING TOO MUCH ABOUT DIFFERENT THINGS: NOT AT ALL
7. FEELING AFRAID AS IF SOMETHING AWFUL MIGHT HAPPEN: NOT AT ALL
7. FEELING AFRAID AS IF SOMETHING AWFUL MIGHT HAPPEN: NOT AT ALL
1. FEELING NERVOUS, ANXIOUS, OR ON EDGE: NOT AT ALL
GAD7 TOTAL SCORE: 0
IF YOU CHECKED OFF ANY PROBLEMS ON THIS QUESTIONNAIRE, HOW DIFFICULT HAVE THESE PROBLEMS MADE IT FOR YOU TO DO YOUR WORK, TAKE CARE OF THINGS AT HOME, OR GET ALONG WITH OTHER PEOPLE: NOT DIFFICULT AT ALL
2. NOT BEING ABLE TO STOP OR CONTROL WORRYING: NOT AT ALL
4. TROUBLE RELAXING: NOT AT ALL
5. BEING SO RESTLESS THAT IT IS HARD TO SIT STILL: NOT AT ALL
8. IF YOU CHECKED OFF ANY PROBLEMS, HOW DIFFICULT HAVE THESE MADE IT FOR YOU TO DO YOUR WORK, TAKE CARE OF THINGS AT HOME, OR GET ALONG WITH OTHER PEOPLE?: NOT DIFFICULT AT ALL
6. BECOMING EASILY ANNOYED OR IRRITABLE: NOT AT ALL
GAD7 TOTAL SCORE: 0

## 2024-08-21 ASSESSMENT — PATIENT HEALTH QUESTIONNAIRE - PHQ9
10. IF YOU CHECKED OFF ANY PROBLEMS, HOW DIFFICULT HAVE THESE PROBLEMS MADE IT FOR YOU TO DO YOUR WORK, TAKE CARE OF THINGS AT HOME, OR GET ALONG WITH OTHER PEOPLE: NOT DIFFICULT AT ALL
SUM OF ALL RESPONSES TO PHQ QUESTIONS 1-9: 1
SUM OF ALL RESPONSES TO PHQ QUESTIONS 1-9: 1

## 2024-08-21 ASSESSMENT — SOCIAL DETERMINANTS OF HEALTH (SDOH): HOW OFTEN DO YOU GET TOGETHER WITH FRIENDS OR RELATIVES?: TWICE A WEEK

## 2024-08-21 NOTE — PROGRESS NOTES
"Preventive Care Visit  [facility]  Roman Reilly MD, Family Medicine  Aug 21, 2024      Assessment & Plan     Routine general medical examination at a health care facility  - Lipid panel reflex to direct LDL Non-fasting  - Lipid panel reflex to direct LDL Non-fasting              BMI  Estimated body mass index is 26.48 kg/m  as calculated from the following:    Height as of this encounter: 1.842 m (6' 0.5\").    Weight as of this encounter: 89.8 kg (198 lb).   Weight management plan: Discussed healthy diet and exercise guidelines    Counseling  Appropriate preventive services were addressed with this patient via screening, questionnaire, or discussion as appropriate for fall prevention, nutrition, physical activity, Tobacco-use cessation, social engagement, weight loss and cognition.  Checklist reviewing preventive services available has been given to the patient.  Reviewed patient's diet, addressing concerns and/or questions.           José Miguel Sidhu is a 44 year old, presenting for the following:  Physical (Male Physical - No Concerns)        8/21/2024     3:14 PM   Additional Questions   Roomed by PAT Fuchs   Accompanied by Self        Health Care Directive  Patient does not have a Health Care Directive or Living Will: Discussed advance care planning with patient; however, patient declined at this time.    HPI        8/21/2024   General Health   How would you rate your overall physical health? Good   Feel stress (tense, anxious, or unable to sleep) Not at all            8/21/2024   Nutrition   Three or more servings of calcium each day? (!) NO   Diet: Regular (no restrictions)   How many servings of fruit and vegetables per day? (!) 2-3   How many sweetened beverages each day? 0-1            8/21/2024   Exercise   Days per week of moderate/strenous exercise 4 days   Average minutes spent exercising at this level 40 min            8/21/2024   Social Factors   Frequency of " gathering with friends or relatives Twice a week   Worry food won't last until get money to buy more No   Food not last or not have enough money for food? No   Do you have housing? (Housing is defined as stable permanent housing and does not include staying ouside in a car, in a tent, in an abandoned building, in an overnight shelter, or couch-surfing.) Yes   Are you worried about losing your housing? No   Lack of transportation? No   Unable to get utilities (heat,electricity)? No            8/21/2024   Dental   Dentist two times every year? Yes            8/21/2024   TB Screening   Were you born outside of the [place]? No          Today's PHQ-9 Score:       8/21/2024     3:07 PM   PHQ-9 SCORE   PHQ-9 Total Score MyChart 1 (Minimal depression)   PHQ-9 Total Score 1         8/21/2024   Substance Use   Alcohol more than 3/day or more than 7/wk No   Do you use any other substances recreationally? No        Social History     Tobacco Use    Smoking status: Former     Current packs/day: 0.00     Average packs/day: 1 pack/day for 13.0 years (13.0 ttl pk-yrs)     Types: Cigarettes, Cigars     Start date: 9/1/1996     Quit date: 2/1/2009     Years since quitting: 15.5    Smokeless tobacco: Never   Vaping Use    Vaping status: Never Used   Substance Use Topics    Alcohol use: Yes     Comment: minimal use    Drug use: No           8/21/2024   STI Screening   New sexual partner(s) since last STI/HIV test? No      ASCVD Risk   The 10-year ASCVD risk score (Abdullahi DK, et al., 2019) is: 2.8%    Values used to calculate the score:      Age: 44 years      Sex: Male      Is Non- : No      Diabetic: No      Tobacco smoker: No      Systolic Blood Pressure: 108 mmHg      Is BP treated: No      HDL Cholesterol: 39 mg/dL      Total Cholesterol: 228 mg/dL        8/21/2024   Contraception/Family Planning   Questions about contraception or family planning No           Reviewed and updated as needed this visit by  "Provider                             Objective    Exam  /69 (BP Location: Right arm, Patient Position: Sitting, Cuff Size: Adult Large)   Pulse 59   Temp 98.5  F (36.9  C) (Oral)   Resp 14   Ht 1.842 m (6' 0.5\")   Wt 89.8 kg (198 lb)   SpO2 96%   BMI 26.48 kg/m     Estimated body mass index is 26.48 kg/m  as calculated from the following:    Height as of this encounter: 1.842 m (6' 0.5\").    Weight as of this encounter: 89.8 kg (198 lb).    Physical Exam  Vitals reviewed.   Constitutional:       General: He is not in acute distress.     Appearance: Normal appearance. He is not ill-appearing.   HENT:      Head: Normocephalic and atraumatic.      Right Ear: External ear normal.      Left Ear: External ear normal.      Nose: Nose normal.      Mouth/Throat:      Mouth: Mucous membranes are moist.      Pharynx: Oropharynx is clear.   Eyes:      General: No scleral icterus.        Right eye: No discharge.         Left eye: No discharge.      Extraocular Movements: Extraocular movements intact.      Pupils: Pupils are equal, round, and reactive to light.   Neck:      Vascular: No JVD.   Cardiovascular:      Rate and Rhythm: Normal rate and regular rhythm.   Pulmonary:      Effort: Pulmonary effort is normal. No respiratory distress.      Breath sounds: Normal breath sounds.   Abdominal:      General: Abdomen is flat. Bowel sounds are normal.      Palpations: Abdomen is soft.   Musculoskeletal:         General: No swelling.      Cervical back: Normal range of motion.   Lymphadenopathy:      Cervical: No cervical adenopathy.   Skin:     General: Skin is warm.      Capillary Refill: Capillary refill takes less than 2 seconds.   Neurological:      General: No focal deficit present.      Mental Status: He is alert.   Psychiatric:         Mood and Affect: Mood normal.         Behavior: Behavior normal.               Signed Electronically by: Roman Reilly MD    Answers submitted by the patient for this " visit:  Patient Health Questionnaire (Submitted on 8/21/2024)  If you checked off any problems, how difficult have these problems made it for you to do your work, take care of things at home, or get along with other people?: Not difficult at all  PHQ9 TOTAL SCORE: 1  Patient Health Questionnaire (G7) (Submitted on 8/21/2024)  SRINIVAS 7 TOTAL SCORE: 0

## 2024-08-21 NOTE — PATIENT INSTRUCTIONS
Patient Education   Preventive Care Advice   This is general advice given by our system to help you stay healthy. However, your care team may have specific advice just for you. Please talk to your care team about your preventive care needs.  Nutrition  Eat 5 or more servings of fruits and vegetables each day.  Try wheat bread, brown rice and whole grain pasta (instead of white bread, rice, and pasta).  Get enough calcium and vitamin D. Check the label on foods and aim for 100% of the RDA (recommended daily allowance).  Lifestyle  Exercise at least 150 minutes each week  (30 minutes a day, 5 days a week).  Do muscle strengthening activities 2 days a week. These help control your weight and prevent disease.  No smoking.  Wear sunscreen to prevent skin cancer.  Have a dental exam and cleaning every 6 months.  Yearly exams  See your health care team every year to talk about:  Any changes in your health.  Any medicines your care team has prescribed.  Preventive care, family planning, and ways to prevent chronic diseases.  Shots (vaccines)   HPV shots (up to age 26), if you've never had them before.  Hepatitis B shots (up to age 59), if you've never had them before.  COVID-19 shot: Get this shot when it's due.  Flu shot: Get a flu shot every year.  Tetanus shot: Get a tetanus shot every 10 years.  Pneumococcal, hepatitis A, and RSV shots: Ask your care team if you need these based on your risk.  Shingles shot (for age 50 and up)  General health tests  Diabetes screening:  Starting at age 35, Get screened for diabetes at least every 3 years.  If you are younger than age 35, ask your care team if you should be screened for diabetes.  Cholesterol test: At age 39, start having a cholesterol test every 5 years, or more often if advised.  Bone density scan (DEXA): At age 50, ask your care team if you should have this scan for osteoporosis (brittle bones).  Hepatitis C: Get tested at least once in your life.  STIs (sexually  transmitted infections)  Before age 24: Ask your care team if you should be screened for STIs.  After age 24: Get screened for STIs if you're at risk. You are at risk for STIs (including HIV) if:  You are sexually active with more than one person.  You don't use condoms every time.  You or a partner was diagnosed with a sexually transmitted infection.  If you are at risk for HIV, ask about PrEP medicine to prevent HIV.  Get tested for HIV at least once in your life, whether you are at risk for HIV or not.  Cancer screening tests  Cervical cancer screening: If you have a cervix, begin getting regular cervical cancer screening tests starting at age 21.  Breast cancer scan (mammogram): If you've ever had breasts, begin having regular mammograms starting at age 40. This is a scan to check for breast cancer.  Colon cancer screening: It is important to start screening for colon cancer at age 45.  Have a colonoscopy test every 10 years (or more often if you're at risk) Or, ask your provider about stool tests like a FIT test every year or Cologuard test every 3 years.  To learn more about your testing options, visit:   .  For help making a decision, visit:   https://bit.ly/gz86673.  Prostate cancer screening test: If you have a prostate, ask your care team if a prostate cancer screening test (PSA) at age 55 is right for you.  Lung cancer screening: If you are a current or former smoker ages 50 to 80, ask your care team if ongoing lung cancer screenings are right for you.  For informational purposes only. Not to replace the advice of your health care provider. Copyright   2023 [employer]. All rights reserved. Clinically reviewed by the [facility]. [employer] 429042 - REV 01/24.

## 2024-08-22 LAB
CHOLEST SERPL-MCNC: 199 MG/DL
FASTING STATUS PATIENT QL REPORTED: YES
HDLC SERPL-MCNC: 52 MG/DL
LDLC SERPL CALC-MCNC: 94 MG/DL
NONHDLC SERPL-MCNC: 147 MG/DL
TRIGL SERPL-MCNC: 263 MG/DL

## 2024-12-23 ENCOUNTER — MYC MEDICAL ADVICE (OUTPATIENT)
Dept: FAMILY MEDICINE | Facility: CLINIC | Age: 44
End: 2024-12-23
Payer: COMMERCIAL

## 2024-12-23 DIAGNOSIS — F32.A DEPRESSION, UNSPECIFIED DEPRESSION TYPE: Primary | ICD-10-CM

## 2024-12-23 RX ORDER — VENLAFAXINE HYDROCHLORIDE 37.5 MG/1
37.5 CAPSULE, EXTENDED RELEASE ORAL DAILY
Qty: 90 CAPSULE | Refills: 0 | Status: SHIPPED | OUTPATIENT
Start: 2024-12-23

## 2024-12-23 NOTE — TELEPHONE ENCOUNTER
See message from my chart ok for RF or e visit     Tammy Renae, HÉCTOR     I started back with the medication probably about 2 months ago. Was feeling a little off. Do I need to do an eVisit still or just refill?

## 2024-12-23 NOTE — TELEPHONE ENCOUNTER
Roman Reilly MD to [facility] - [facility] Regarding result: venlafaxine (EFFEXOR XR) 37.5 MG 24 hr capsule   TATO      12/23/24  3:35 PM  Please let patient know I sent in a refill I would suggest he schedule follow-up visit for mental health.    tato

## 2024-12-23 NOTE — TELEPHONE ENCOUNTER
Clinic RN: Please investigate patient's chart or contact patient if the information cannot be found because the medication is listed as historical or discontinued. Confirm patient is taking this medication. Document findings and route refill encounter to provider for approval or denial.    Gabriella MESSINA, RN, PHN

## 2025-03-24 ENCOUNTER — OFFICE VISIT (OUTPATIENT)
Dept: FAMILY MEDICINE | Facility: CLINIC | Age: 45
End: 2025-03-24
Payer: COMMERCIAL

## 2025-03-24 VITALS
SYSTOLIC BLOOD PRESSURE: 123 MMHG | HEART RATE: 58 BPM | RESPIRATION RATE: 18 BRPM | OXYGEN SATURATION: 98 % | DIASTOLIC BLOOD PRESSURE: 82 MMHG | HEIGHT: 73 IN | BODY MASS INDEX: 27.61 KG/M2 | TEMPERATURE: 97.9 F | WEIGHT: 208.3 LBS

## 2025-03-24 DIAGNOSIS — F33.42 RECURRENT MAJOR DEPRESSION IN COMPLETE REMISSION: Primary | ICD-10-CM

## 2025-03-24 PROCEDURE — 99213 OFFICE O/P EST LOW 20 MIN: CPT | Performed by: FAMILY MEDICINE

## 2025-03-24 PROCEDURE — 3079F DIAST BP 80-89 MM HG: CPT | Performed by: FAMILY MEDICINE

## 2025-03-24 PROCEDURE — 1126F AMNT PAIN NOTED NONE PRSNT: CPT | Performed by: FAMILY MEDICINE

## 2025-03-24 PROCEDURE — 3074F SYST BP LT 130 MM HG: CPT | Performed by: FAMILY MEDICINE

## 2025-03-24 RX ORDER — VENLAFAXINE HYDROCHLORIDE 37.5 MG/1
37.5 CAPSULE, EXTENDED RELEASE ORAL DAILY
Qty: 90 CAPSULE | Refills: 5 | Status: SHIPPED | OUTPATIENT
Start: 2025-03-24

## 2025-03-24 RX ORDER — FLUTICASONE PROPIONATE 50 MCG
2 SPRAY, SUSPENSION (ML) NASAL PRN
COMMUNITY
Start: 2025-01-02

## 2025-03-24 ASSESSMENT — ANXIETY QUESTIONNAIRES
3. WORRYING TOO MUCH ABOUT DIFFERENT THINGS: NOT AT ALL
6. BECOMING EASILY ANNOYED OR IRRITABLE: NOT AT ALL
5. BEING SO RESTLESS THAT IT IS HARD TO SIT STILL: NOT AT ALL
7. FEELING AFRAID AS IF SOMETHING AWFUL MIGHT HAPPEN: NOT AT ALL
4. TROUBLE RELAXING: NOT AT ALL
GAD7 TOTAL SCORE: 0
1. FEELING NERVOUS, ANXIOUS, OR ON EDGE: NOT AT ALL
2. NOT BEING ABLE TO STOP OR CONTROL WORRYING: NOT AT ALL
7. FEELING AFRAID AS IF SOMETHING AWFUL MIGHT HAPPEN: NOT AT ALL
8. IF YOU CHECKED OFF ANY PROBLEMS, HOW DIFFICULT HAVE THESE MADE IT FOR YOU TO DO YOUR WORK, TAKE CARE OF THINGS AT HOME, OR GET ALONG WITH OTHER PEOPLE?: NOT DIFFICULT AT ALL
GAD7 TOTAL SCORE: 0
IF YOU CHECKED OFF ANY PROBLEMS ON THIS QUESTIONNAIRE, HOW DIFFICULT HAVE THESE PROBLEMS MADE IT FOR YOU TO DO YOUR WORK, TAKE CARE OF THINGS AT HOME, OR GET ALONG WITH OTHER PEOPLE: NOT DIFFICULT AT ALL
GAD7 TOTAL SCORE: 0

## 2025-03-24 ASSESSMENT — PATIENT HEALTH QUESTIONNAIRE - PHQ9
SUM OF ALL RESPONSES TO PHQ QUESTIONS 1-9: 0
SUM OF ALL RESPONSES TO PHQ QUESTIONS 1-9: 0
10. IF YOU CHECKED OFF ANY PROBLEMS, HOW DIFFICULT HAVE THESE PROBLEMS MADE IT FOR YOU TO DO YOUR WORK, TAKE CARE OF THINGS AT HOME, OR GET ALONG WITH OTHER PEOPLE: NOT DIFFICULT AT ALL

## 2025-03-24 ASSESSMENT — PAIN SCALES - GENERAL: PAINLEVEL_OUTOF10: NO PAIN (0)

## 2025-03-24 NOTE — PROGRESS NOTES
"  Assessment & Plan     Recurrent major depression in complete remission  Controlled, chronic stable.  Continue current medical management.  - venlafaxine (EFFEXOR XR) 37.5 MG 24 hr capsule  Dispense: 90 capsule; Refill: 5            BMI  Estimated body mass index is 27.48 kg/m  as calculated from the following:    Height as of this encounter: 1.854 m (6' 1\").    Weight as of this encounter: 94.5 kg (208 lb 4.8 oz).             José Miguel Sidhu is a 44 year old, presenting for the following health issues:  Medication Refill (Pt would like to request a medication refill on Venlafaxine. )        3/24/2025     2:18 PM   Additional Questions   Roomed by Shannan Ferrera MA Learner   Accompanied by Self     History of Present Illness       Reason for visit:  Medication He is missing 1 dose(s) of medications per week.  He is not taking prescribed medications regularly due to remembering to take.          Medication Follow Up  Taking Medication as prescribed: yes  Side Effects:  None  Medication Helping Symptoms:  yes  Overall doing well, intermittently forgets to take the medication and noticed some irritability otherwise when on the medication he is doing well.  He would like to continue taking this.            7/6/2023     9:10 AM 8/21/2024     3:07 PM 3/24/2025     1:56 PM   SRINIVAS-7 SCORE   Total Score 3 (minimal anxiety) 0 (minimal anxiety) 0 (minimal anxiety)   Total Score 3 0 0        Patient-reported           7/6/2023     9:09 AM 8/21/2024     3:07 PM 3/24/2025     1:56 PM   PHQ   PHQ-9 Total Score 3 1 0    Q9: Thoughts of better off dead/self-harm past 2 weeks Not at all  Not at all Not at all       Patient-reported    Proxy-reported                 Objective    /82 (BP Location: Right arm, Patient Position: Sitting, Cuff Size: Adult Regular)   Pulse 58   Temp 97.9  F (36.6  C) (Oral)   Resp 18   Ht 1.854 m (6' 1\")   Wt 94.5 kg (208 lb 4.8 oz)   SpO2 98%   BMI 27.48 kg/m    Body mass index is " 27.48 kg/m .  Physical Exam  Cardiovascular:      Rate and Rhythm: Normal rate and regular rhythm.   Pulmonary:      Effort: Pulmonary effort is normal.      Breath sounds: Normal breath sounds.   Psychiatric:         Mood and Affect: Mood normal.         Behavior: Behavior normal.         Thought Content: Thought content normal.         Judgment: Judgment normal.                    Signed Electronically by: Roman Reilly MD

## 2025-05-22 ENCOUNTER — APPOINTMENT (OUTPATIENT)
Dept: CT IMAGING | Facility: CLINIC | Age: 45
DRG: 390 | End: 2025-05-22
Attending: EMERGENCY MEDICINE
Payer: COMMERCIAL

## 2025-05-22 ENCOUNTER — OFFICE VISIT (OUTPATIENT)
Dept: URGENT CARE | Facility: URGENT CARE | Age: 45
End: 2025-05-22
Payer: COMMERCIAL

## 2025-05-22 ENCOUNTER — HOSPITAL ENCOUNTER (INPATIENT)
Facility: CLINIC | Age: 45
LOS: 2 days | Discharge: HOME | DRG: 390 | End: 2025-05-24
Attending: EMERGENCY MEDICINE | Admitting: INTERNAL MEDICINE
Payer: COMMERCIAL

## 2025-05-22 VITALS
SYSTOLIC BLOOD PRESSURE: 132 MMHG | BODY MASS INDEX: 27.77 KG/M2 | DIASTOLIC BLOOD PRESSURE: 85 MMHG | WEIGHT: 205 LBS | HEART RATE: 69 BPM | RESPIRATION RATE: 22 BRPM | TEMPERATURE: 97.6 F | HEIGHT: 72 IN | OXYGEN SATURATION: 98 %

## 2025-05-22 DIAGNOSIS — K56.609 SBO (SMALL BOWEL OBSTRUCTION) (H): ICD-10-CM

## 2025-05-22 DIAGNOSIS — R10.12 ABDOMINAL PAIN, LEFT UPPER QUADRANT: Primary | ICD-10-CM

## 2025-05-22 LAB
ALBUMIN SERPL BCG-MCNC: 4.8 G/DL (ref 3.5–5.2)
ALBUMIN UR-MCNC: 20 MG/DL
ALP SERPL-CCNC: 50 U/L (ref 40–150)
ALT SERPL W P-5'-P-CCNC: 19 U/L (ref 0–70)
ANION GAP SERPL CALCULATED.3IONS-SCNC: 11 MMOL/L (ref 7–15)
APPEARANCE UR: CLEAR
AST SERPL W P-5'-P-CCNC: 21 U/L (ref 0–45)
BILIRUB DIRECT SERPL-MCNC: 0.13 MG/DL (ref 0–0.3)
BILIRUB SERPL-MCNC: 0.5 MG/DL
BILIRUB UR QL STRIP: NEGATIVE
BUN SERPL-MCNC: 17.1 MG/DL (ref 6–20)
CALCIUM SERPL-MCNC: 9.8 MG/DL (ref 8.8–10.4)
CHLORIDE SERPL-SCNC: 103 MMOL/L (ref 98–107)
COLOR UR AUTO: YELLOW
CREAT SERPL-MCNC: 1.06 MG/DL (ref 0.67–1.17)
EGFRCR SERPLBLD CKD-EPI 2021: 88 ML/MIN/1.73M2
ERYTHROCYTE [DISTWIDTH] IN BLOOD BY AUTOMATED COUNT: 12.5 % (ref 10–15)
GLUCOSE SERPL-MCNC: 115 MG/DL (ref 70–99)
GLUCOSE UR STRIP-MCNC: NEGATIVE MG/DL
HCO3 SERPL-SCNC: 27 MMOL/L (ref 22–29)
HCT VFR BLD AUTO: 46.3 % (ref 40–53)
HGB BLD-MCNC: 15.8 G/DL (ref 13.3–17.7)
HGB UR QL STRIP: NEGATIVE
HOLD SPECIMEN: NORMAL
KETONES UR STRIP-MCNC: NEGATIVE MG/DL
LACTATE SERPL-SCNC: 0.7 MMOL/L (ref 0.7–2)
LEUKOCYTE ESTERASE UR QL STRIP: NEGATIVE
LIPASE SERPL-CCNC: 26 U/L (ref 13–60)
MCH RBC QN AUTO: 30.9 PG (ref 26.5–33)
MCHC RBC AUTO-ENTMCNC: 34.1 G/DL (ref 31.5–36.5)
MCV RBC AUTO: 91 FL (ref 78–100)
MUCOUS THREADS #/AREA URNS LPF: PRESENT /LPF
NITRATE UR QL: NEGATIVE
PH UR STRIP: 7 [PH] (ref 5–7)
PLATELET # BLD AUTO: 226 10E3/UL (ref 150–450)
POTASSIUM SERPL-SCNC: 4.4 MMOL/L (ref 3.4–5.3)
PROT SERPL-MCNC: 7.5 G/DL (ref 6.4–8.3)
RBC # BLD AUTO: 5.11 10E6/UL (ref 4.4–5.9)
RBC URINE: 1 /HPF
SODIUM SERPL-SCNC: 141 MMOL/L (ref 135–145)
SP GR UR STRIP: 1.03 (ref 1–1.03)
SQUAMOUS EPITHELIAL: <1 /HPF
UROBILINOGEN UR STRIP-MCNC: 2 MG/DL
WBC # BLD AUTO: 8.6 10E3/UL (ref 4–11)
WBC URINE: 1 /HPF

## 2025-05-22 PROCEDURE — 84460 ALANINE AMINO (ALT) (SGPT): CPT | Performed by: PHYSICIAN ASSISTANT

## 2025-05-22 PROCEDURE — 81001 URINALYSIS AUTO W/SCOPE: CPT | Performed by: EMERGENCY MEDICINE

## 2025-05-22 PROCEDURE — 99214 OFFICE O/P EST MOD 30 MIN: CPT | Performed by: PHYSICIAN ASSISTANT

## 2025-05-22 PROCEDURE — 120N000004 HC R&B MS OVERFLOW

## 2025-05-22 PROCEDURE — 83690 ASSAY OF LIPASE: CPT | Performed by: EMERGENCY MEDICINE

## 2025-05-22 PROCEDURE — 80048 BASIC METABOLIC PNL TOTAL CA: CPT | Performed by: EMERGENCY MEDICINE

## 2025-05-22 PROCEDURE — 99285 EMERGENCY DEPT VISIT HI MDM: CPT | Mod: 25

## 2025-05-22 PROCEDURE — 85014 HEMATOCRIT: CPT | Performed by: EMERGENCY MEDICINE

## 2025-05-22 PROCEDURE — 99223 1ST HOSP IP/OBS HIGH 75: CPT | Performed by: INTERNAL MEDICINE

## 2025-05-22 PROCEDURE — 84450 TRANSFERASE (AST) (SGOT): CPT | Performed by: PHYSICIAN ASSISTANT

## 2025-05-22 PROCEDURE — 84075 ASSAY ALKALINE PHOSPHATASE: CPT | Performed by: PHYSICIAN ASSISTANT

## 2025-05-22 PROCEDURE — 83690 ASSAY OF LIPASE: CPT | Performed by: PHYSICIAN ASSISTANT

## 2025-05-22 PROCEDURE — 85018 HEMOGLOBIN: CPT | Performed by: EMERGENCY MEDICINE

## 2025-05-22 PROCEDURE — 36415 COLL VENOUS BLD VENIPUNCTURE: CPT | Performed by: EMERGENCY MEDICINE

## 2025-05-22 PROCEDURE — 36415 COLL VENOUS BLD VENIPUNCTURE: CPT | Performed by: PHYSICIAN ASSISTANT

## 2025-05-22 PROCEDURE — 82247 BILIRUBIN TOTAL: CPT | Performed by: PHYSICIAN ASSISTANT

## 2025-05-22 PROCEDURE — 74177 CT ABD & PELVIS W/CONTRAST: CPT

## 2025-05-22 PROCEDURE — 80053 COMPREHEN METABOLIC PANEL: CPT | Performed by: EMERGENCY MEDICINE

## 2025-05-22 PROCEDURE — 80048 BASIC METABOLIC PNL TOTAL CA: CPT | Performed by: PHYSICIAN ASSISTANT

## 2025-05-22 PROCEDURE — 250N000013 HC RX MED GY IP 250 OP 250 PS 637: Performed by: EMERGENCY MEDICINE

## 2025-05-22 PROCEDURE — 96360 HYDRATION IV INFUSION INIT: CPT | Mod: 59

## 2025-05-22 PROCEDURE — 83605 ASSAY OF LACTIC ACID: CPT | Performed by: EMERGENCY MEDICINE

## 2025-05-22 PROCEDURE — 84155 ASSAY OF PROTEIN SERUM: CPT | Performed by: PHYSICIAN ASSISTANT

## 2025-05-22 PROCEDURE — 3075F SYST BP GE 130 - 139MM HG: CPT | Performed by: PHYSICIAN ASSISTANT

## 2025-05-22 PROCEDURE — 82248 BILIRUBIN DIRECT: CPT | Performed by: EMERGENCY MEDICINE

## 2025-05-22 PROCEDURE — 3079F DIAST BP 80-89 MM HG: CPT | Performed by: PHYSICIAN ASSISTANT

## 2025-05-22 PROCEDURE — 258N000003 HC RX IP 258 OP 636: Performed by: EMERGENCY MEDICINE

## 2025-05-22 PROCEDURE — 250N000009 HC RX 250: Performed by: EMERGENCY MEDICINE

## 2025-05-22 PROCEDURE — 96361 HYDRATE IV INFUSION ADD-ON: CPT

## 2025-05-22 PROCEDURE — 250N000011 HC RX IP 250 OP 636: Performed by: EMERGENCY MEDICINE

## 2025-05-22 RX ORDER — IOPAMIDOL 755 MG/ML
500 INJECTION, SOLUTION INTRAVASCULAR ONCE
Status: COMPLETED | OUTPATIENT
Start: 2025-05-22 | End: 2025-05-22

## 2025-05-22 RX ORDER — SODIUM CHLORIDE 9 MG/ML
INJECTION, SOLUTION INTRAVENOUS CONTINUOUS
Status: DISCONTINUED | OUTPATIENT
Start: 2025-05-22 | End: 2025-05-24 | Stop reason: HOSPADM

## 2025-05-22 RX ORDER — AMOXICILLIN 250 MG
2 CAPSULE ORAL 2 TIMES DAILY PRN
Status: DISCONTINUED | OUTPATIENT
Start: 2025-05-22 | End: 2025-05-24 | Stop reason: HOSPADM

## 2025-05-22 RX ORDER — ENOXAPARIN SODIUM 100 MG/ML
40 INJECTION SUBCUTANEOUS EVERY 24 HOURS
Status: DISCONTINUED | OUTPATIENT
Start: 2025-05-22 | End: 2025-05-24 | Stop reason: HOSPADM

## 2025-05-22 RX ORDER — CALCIUM CARBONATE 500 MG/1
1000 TABLET, CHEWABLE ORAL 4 TIMES DAILY PRN
Status: DISCONTINUED | OUTPATIENT
Start: 2025-05-22 | End: 2025-05-24 | Stop reason: HOSPADM

## 2025-05-22 RX ORDER — ACETAMINOPHEN 650 MG/1
650 SUPPOSITORY RECTAL EVERY 4 HOURS PRN
Status: DISCONTINUED | OUTPATIENT
Start: 2025-05-22 | End: 2025-05-24 | Stop reason: HOSPADM

## 2025-05-22 RX ORDER — ONDANSETRON 4 MG/1
4 TABLET, ORALLY DISINTEGRATING ORAL EVERY 6 HOURS PRN
Status: DISCONTINUED | OUTPATIENT
Start: 2025-05-22 | End: 2025-05-24 | Stop reason: HOSPADM

## 2025-05-22 RX ORDER — ONDANSETRON 2 MG/ML
4 INJECTION INTRAMUSCULAR; INTRAVENOUS EVERY 6 HOURS PRN
Status: DISCONTINUED | OUTPATIENT
Start: 2025-05-22 | End: 2025-05-24 | Stop reason: HOSPADM

## 2025-05-22 RX ORDER — PROCHLORPERAZINE MALEATE 5 MG/1
10 TABLET ORAL EVERY 6 HOURS PRN
Status: DISCONTINUED | OUTPATIENT
Start: 2025-05-22 | End: 2025-05-24 | Stop reason: HOSPADM

## 2025-05-22 RX ORDER — ACETAMINOPHEN 325 MG/1
650 TABLET ORAL EVERY 4 HOURS PRN
Status: DISCONTINUED | OUTPATIENT
Start: 2025-05-22 | End: 2025-05-24 | Stop reason: HOSPADM

## 2025-05-22 RX ORDER — AMOXICILLIN 250 MG
1 CAPSULE ORAL 2 TIMES DAILY PRN
Status: DISCONTINUED | OUTPATIENT
Start: 2025-05-22 | End: 2025-05-24 | Stop reason: HOSPADM

## 2025-05-22 RX ORDER — POLYETHYLENE GLYCOL 3350 17 G/17G
17 POWDER, FOR SOLUTION ORAL DAILY
Status: DISCONTINUED | OUTPATIENT
Start: 2025-05-23 | End: 2025-05-24 | Stop reason: HOSPADM

## 2025-05-22 RX ORDER — LIDOCAINE 40 MG/G
CREAM TOPICAL
Status: DISCONTINUED | OUTPATIENT
Start: 2025-05-22 | End: 2025-05-24 | Stop reason: HOSPADM

## 2025-05-22 RX ORDER — OXYCODONE AND ACETAMINOPHEN 5; 325 MG/1; MG/1
2 TABLET ORAL ONCE
Refills: 0 | Status: COMPLETED | OUTPATIENT
Start: 2025-05-22 | End: 2025-05-22

## 2025-05-22 RX ADMIN — SODIUM CHLORIDE 65 ML: 9 INJECTION, SOLUTION INTRAVENOUS at 22:11

## 2025-05-22 RX ADMIN — IOPAMIDOL 100 ML: 755 INJECTION, SOLUTION INTRAVENOUS at 22:11

## 2025-05-22 RX ADMIN — OXYCODONE HYDROCHLORIDE AND ACETAMINOPHEN 2 TABLET: 5; 325 TABLET ORAL at 21:04

## 2025-05-22 RX ADMIN — SODIUM CHLORIDE 1000 ML: 0.9 INJECTION, SOLUTION INTRAVENOUS at 21:05

## 2025-05-22 ASSESSMENT — ENCOUNTER SYMPTOMS
DYSURIA: 0
DIARRHEA: 0
FEVER: 0
CONSTIPATION: 0
BLOOD IN STOOL: 0
VOMITING: 0
DIFFICULTY URINATING: 0
SHORTNESS OF BREATH: 0

## 2025-05-22 ASSESSMENT — COLUMBIA-SUICIDE SEVERITY RATING SCALE - C-SSRS
1. IN THE PAST MONTH, HAVE YOU WISHED YOU WERE DEAD OR WISHED YOU COULD GO TO SLEEP AND NOT WAKE UP?: NO
6. HAVE YOU EVER DONE ANYTHING, STARTED TO DO ANYTHING, OR PREPARED TO DO ANYTHING TO END YOUR LIFE?: NO
2. HAVE YOU ACTUALLY HAD ANY THOUGHTS OF KILLING YOURSELF IN THE PAST MONTH?: NO

## 2025-05-22 ASSESSMENT — ACTIVITIES OF DAILY LIVING (ADL)
ADLS_ACUITY_SCORE: 41

## 2025-05-22 NOTE — PROGRESS NOTES
Urgent Care Clinic Visit    Chief Complaint   Patient presents with    Abdominal Pain     Abdominal pain today, middle of the stomach sharp dull pain,               5/22/2025     5:36 PM   Additional Questions   Roomed by sofia

## 2025-05-22 NOTE — PROGRESS NOTES
Assessment & Plan:        ICD-10-CM    1. Abdominal pain, left upper quadrant  R10.12 Comprehensive metabolic panel (BMP + Alb, Alk Phos, ALT, AST, Total. Bili, TP)     Lipase            Plan/Clinical Decision Making:          No follow-ups on file.     At the end of the encounter, I discussed results, diagnosis, medications. Discussed red flags for immediate return to clinic/ER, as well as indications for follow up if no improvement. Patient understood and agreed to plan. Patient was stable for discharge.        Petra Elizabeth PA-C on 5/22/2025 at 5:54 PM          Subjective:     HPI:    Nahum is a 44 year old male who presents to clinic today for the following health issues:  Chief Complaint   Patient presents with    Abdominal Pain     Abdominal pain today, middle of the stomach sharp dull pain,     HPI    Today at work developed bad epigastric pain. Pain was bad enough to stop driving. Picked up TUMS and didn't help.   Chronic GERD, takes Protonix daily.    Didn't eat anything that he knows would flare this up. No spicy foods or fatty foods.   No heartburn. No acidic taste.     No hx of abdominal surgeries.     Review of Systems   Constitutional:  Negative for fever.   Respiratory:  Negative for shortness of breath.    Cardiovascular:  Negative for chest pain and leg swelling.   Gastrointestinal:  Negative for blood in stool, constipation, diarrhea and vomiting.   Genitourinary:  Negative for difficulty urinating and dysuria.         Patient Active Problem List   Diagnosis    GERD (gastroesophageal reflux disease)        Past Medical History:   Diagnosis Date    Anxiety 12/2021    on medication in 20's but not for a long time - seeing therapist now    Environmental allergies     GERD (gastroesophageal reflux disease)        Social History     Tobacco Use    Smoking status: Former     Current packs/day: 0.00     Average packs/day: 1 pack/day for 13.0 years (13.0 ttl pk-yrs)     Types: Cigarettes, Cigars      Start date: 1996     Quit date: 2009     Years since quittin.3    Smokeless tobacco: Never   Substance Use Topics    Alcohol use: Yes     Comment: minimal use             Objective:     Vitals:    25 1732   BP: 132/85   Pulse: 69   Resp: 22   Temp: 97.6  F (36.4  C)   TempSrc: Oral   SpO2: 98%   Weight: 93 kg (205 lb)   Height: 1.829 m (6')         Physical Exam   EXAM: ***  Pleasant, alert, appropriate appearance. NAD.  Head Exam: Normocephalic, atraumatic.  Eye Exam:  PERRLA, EOMI, non icteric/injection.    Ear Exam: TMs grey without bulging. Normal canals.  Normal pinna.  Nose Exam: Normal external nose.    OroPharynx Exam:  Moist mucous membranes. No erythema, pharynx without exudate or hypertrophy.  Neck/Thyroid Exam:  No LAD.  No nodules or enlargement.  Chest/Respiratory Exam: CTAB.  Cardiovascular Exam: RRR. No murmur or rubs.  ABD: soft, Non-tender, normal bowel sounds, no rebound/guarding.  No masses/organomegaly.  Ext/musculoskeletal: Grossly intact, No edema, DP pulses 2+ equal bilateral.  Neuro: CN II-XII intact grossly intact.  Skin: no rash or lesion.      Results:  No results found for any visits on 25.       was performed after the injection of 100mL Isovue 370 intravenously. Multiplanar reformats were obtained. Dose reduction techniques were used.    FINDINGS:   LOWER CHEST: Basilar pulmonary opacities, likely atelectasis.    ABDOMEN/PELVIS:    HEPATOBILIARY: No suspicious focal hepatic lesion. No radiodense gallstones.    PANCREAS: No main pancreatic ductal dilatation or definite solid pancreatic mass.    SPLEEN: No splenomegaly.    ADRENAL GLANDS: No adrenal nodules.    KIDNEYS/BLADDER: No radiodense kidney/ureteral stones or hydronephrosis in either kidney.    BOWEL: The appendix is visualized and appears normal. Multiple dilated small bowel loops with transition point in the right midabdomen (series 3 image 130), findings worrisome for small bowel obstruction.    PERITONEUM: No evidence of free fluid in the abdomen and pelvis. No free peritoneal or portal venous gas.    PELVIC ORGANS: Unremarkable.    VASCULATURE: Unremarkable.    LYMPH NODES: No significant abdominopelvic lymphadenopathy.    MUSCULOSKELETAL: No suspicious osseous lesion.      Impression    IMPRESSION:  1.  Multiple dilated small bowel loops with transition point in the right mid abdomen, findings worrisome for small bowel obstruction.     XR Abdomen 2 Views     Status: None    Narrative    EXAM: XR ABDOMEN 2 VIEWS  LOCATION: [facility]  DATE: 5/23/2025    INDICATION: SBO follow up. Clinically better  COMPARISON: CT abdomen pelvis 5/22/2025      Impression    IMPRESSION: The bowel gas pattern is normal. Mild colonic fecal burden. Nothing for obstruction or free air. No evidence for renal stones. Pelvic phleboliths. No aggressive osseous lesion.   Basic metabolic panel (BMP)     Status: Abnormal   Result Value Ref Range    Sodium 141 135 - 145 mmol/L    Potassium 4.4 3.4 - 5.3 mmol/L    Chloride 103 98 - 107 mmol/L    Carbon Dioxide (CO2) 27 22 - 29 mmol/L    Anion Gap 11 7 - 15 mmol/L    Urea Nitrogen 17.1 6.0 - 20.0 mg/dL     Creatinine 1.06 0.67 - 1.17 mg/dL    GFR Estimate 88 >60 mL/min/1.73m2    Calcium 9.8 8.8 - 10.4 mg/dL    Glucose 115 (H) 70 - 99 mg/dL   CBC (platelets, no diff)     Status: Normal   Result Value Ref Range    WBC Count 8.6 4.0 - 11.0 10e3/uL    RBC Count 5.11 4.40 - 5.90 10e6/uL    Hemoglobin 15.8 13.3 - 17.7 g/dL    Hematocrit 46.3 40.0 - 53.0 %    MCV 91 78 - 100 fL    MCH 30.9 26.5 - 33.0 pg    MCHC 34.1 31.5 - 36.5 g/dL    RDW 12.5 10.0 - 15.0 %    Platelet Count 226 150 - 450 10e3/uL   Hepatic panel     Status: Normal   Result Value Ref Range    Protein Total 7.5 6.4 - 8.3 g/dL    Albumin 4.8 3.5 - 5.2 g/dL    Bilirubin Total 0.5 <=1.2 mg/dL    Alkaline Phosphatase 50 40 - 150 U/L    AST 21 0 - 45 U/L    ALT 19 0 - 70 U/L    Bilirubin Direct 0.13 0.00 - 0.30 mg/dL   Lipase     Status: Normal   Result Value Ref Range    Lipase 26 13 - 60 U/L   UA with Microscopic reflex to Culture     Status: Abnormal    Specimen: Urine, Midstream   Result Value Ref Range    Color Urine Yellow Colorless, Straw, Light Yellow, Yellow    Appearance Urine Clear Clear    Glucose Urine Negative Negative mg/dL    Bilirubin Urine Negative Negative    Ketones Urine Negative Negative mg/dL    Specific Gravity Urine 1.032 1.003 - 1.035    Blood Urine Negative Negative    pH Urine 7.0 5.0 - 7.0    Protein Albumin Urine 20 (A) Negative mg/dL    Urobilinogen Urine 2.0 (A) Normal mg/dL    Nitrite Urine Negative Negative    Leukocyte Esterase Urine Negative Negative    Mucus Urine Present (A) None Seen /LPF    RBC Urine 1 <=2 /HPF    WBC Urine 1 <=5 /HPF    Squamous Epithelials Urine <1 <=1 /HPF    Narrative    Urine Culture not indicated   [place] Draw     Status: None    Narrative    The following orders were created for panel order [place] Draw.  Procedure                               Abnormality         Status                     ---------                               -----------         ------                     Extra Blue Top  Tube[[id number]]                             Final result               Extra Red Top Tube[[id number]]                              Final result                 Please view results for these tests on the individual orders.   Extra Blue Top Tube     Status: None   Result Value Ref Range    Hold Specimen JIC    Extra Red Top Tube     Status: None   Result Value Ref Range    Hold Specimen JIC    Lactic Acid Whole Blood with 1X Repeat in 2 HR when >2     Status: Normal   Result Value Ref Range    Lactic Acid, Initial 0.7 0.7 - 2.0 mmol/L   Comprehensive metabolic panel     Status: Abnormal   Result Value Ref Range    Sodium 140 135 - 145 mmol/L    Potassium 4.3 3.4 - 5.3 mmol/L    Carbon Dioxide (CO2) 27 22 - 29 mmol/L    Anion Gap 9 7 - 15 mmol/L    Urea Nitrogen 13.0 6.0 - 20.0 mg/dL    Creatinine 0.98 0.67 - 1.17 mg/dL    GFR Estimate >90 >60 mL/min/1.73m2    Calcium 9.3 8.8 - 10.4 mg/dL    Chloride 104 98 - 107 mmol/L    Glucose 97 70 - 99 mg/dL    Alkaline Phosphatase 42 40 - 150 U/L    AST 16 0 - 45 U/L    ALT 16 0 - 70 U/L    Protein Total 6.3 (L) 6.4 - 8.3 g/dL    Albumin 3.8 3.5 - 5.2 g/dL    Bilirubin Total 0.8 <=1.2 mg/dL   CBC with platelets and differential     Status: None   Result Value Ref Range    WBC Count 5.7 4.0 - 11.0 10e3/uL    RBC Count 4.78 4.40 - 5.90 10e6/uL    Hemoglobin 14.9 13.3 - 17.7 g/dL    Hematocrit 43.9 40.0 - 53.0 %    MCV 92 78 - 100 fL    MCH 31.2 26.5 - 33.0 pg    MCHC 33.9 31.5 - 36.5 g/dL    RDW 12.7 10.0 - 15.0 %    Platelet Count 196 150 - 450 10e3/uL    % Neutrophils 59 %    % Lymphocytes 30 %    % Monocytes 7 %    % Eosinophils 3 %    % Basophils 1 %    % Immature Granulocytes 0 %    NRBCs per 100 WBC 0 <1 /100    Absolute Neutrophils 3.4 1.6 - 8.3 10e3/uL    Absolute Lymphocytes 1.7 0.8 - 5.3 10e3/uL    Absolute Monocytes 0.4 0.0 - 1.3 10e3/uL    Absolute Eosinophils 0.2 0.0 - 0.7 10e3/uL    Absolute Basophils 0.0 0.0 - 0.2 10e3/uL    Absolute Immature Granulocytes 0.0 <=0.4  10e3/uL    Absolute NRBCs 0.0 10e3/uL   CBC with platelets differential     Status: None    Narrative    The following orders were created for panel order CBC with platelets differential.  Procedure                               Abnormality         Status                     ---------                               -----------         ------                     CBC with platelets and ...[[id number]]                      Final result                 Please view results for these tests on the individual orders.   Results for orders placed or performed in visit on 05/22/25   Extra Purple Top EDTA (LAB USE ONLY)     Status: None   Result Value Ref Range    Hold Specimen [facility]    Comprehensive metabolic panel (BMP + Alb, Alk Phos, ALT, AST, Total. Bili, TP)     Status: Abnormal   Result Value Ref Range    Sodium 140 135 - 145 mmol/L    Potassium 4.3 3.4 - 5.3 mmol/L    Carbon Dioxide (CO2) 26 22 - 29 mmol/L    Anion Gap 9 7 - 15 mmol/L    Urea Nitrogen 18.4 6.0 - 20.0 mg/dL    Creatinine 1.03 0.67 - 1.17 mg/dL    GFR Estimate >90 >60 mL/min/1.73m2    Calcium 9.6 8.8 - 10.4 mg/dL    Chloride 105 98 - 107 mmol/L    Glucose 103 (H) 70 - 99 mg/dL    Alkaline Phosphatase 48 40 - 150 U/L    AST 21 0 - 45 U/L    ALT 20 0 - 70 U/L    Protein Total 7.4 6.4 - 8.3 g/dL    Albumin 4.5 3.5 - 5.2 g/dL    Bilirubin Total 0.4 <=1.2 mg/dL   Lipase     Status: Normal   Result Value Ref Range    Lipase 35 13 - 60 U/L

## 2025-05-23 ENCOUNTER — APPOINTMENT (OUTPATIENT)
Dept: GENERAL RADIOLOGY | Facility: CLINIC | Age: 45
DRG: 390 | End: 2025-05-23
Attending: HOSPITALIST
Payer: COMMERCIAL

## 2025-05-23 LAB
ALBUMIN SERPL BCG-MCNC: 3.8 G/DL (ref 3.5–5.2)
ALP SERPL-CCNC: 42 U/L (ref 40–150)
ALT SERPL W P-5'-P-CCNC: 16 U/L (ref 0–70)
ANION GAP SERPL CALCULATED.3IONS-SCNC: 9 MMOL/L (ref 7–15)
AST SERPL W P-5'-P-CCNC: 16 U/L (ref 0–45)
BASOPHILS # BLD AUTO: 0 10E3/UL (ref 0–0.2)
BASOPHILS NFR BLD AUTO: 1 %
BILIRUB SERPL-MCNC: 0.8 MG/DL
BUN SERPL-MCNC: 13 MG/DL (ref 6–20)
CALCIUM SERPL-MCNC: 9.3 MG/DL (ref 8.8–10.4)
CHLORIDE SERPL-SCNC: 104 MMOL/L (ref 98–107)
CREAT SERPL-MCNC: 0.98 MG/DL (ref 0.67–1.17)
EGFRCR SERPLBLD CKD-EPI 2021: >90 ML/MIN/1.73M2
EOSINOPHIL # BLD AUTO: 0.2 10E3/UL (ref 0–0.7)
EOSINOPHIL NFR BLD AUTO: 3 %
ERYTHROCYTE [DISTWIDTH] IN BLOOD BY AUTOMATED COUNT: 12.7 % (ref 10–15)
GLUCOSE SERPL-MCNC: 97 MG/DL (ref 70–99)
HCO3 SERPL-SCNC: 27 MMOL/L (ref 22–29)
HCT VFR BLD AUTO: 43.9 % (ref 40–53)
HGB BLD-MCNC: 14.9 G/DL (ref 13.3–17.7)
IMM GRANULOCYTES # BLD: 0 10E3/UL
IMM GRANULOCYTES NFR BLD: 0 %
LYMPHOCYTES # BLD AUTO: 1.7 10E3/UL (ref 0.8–5.3)
LYMPHOCYTES NFR BLD AUTO: 30 %
MCH RBC QN AUTO: 31.2 PG (ref 26.5–33)
MCHC RBC AUTO-ENTMCNC: 33.9 G/DL (ref 31.5–36.5)
MCV RBC AUTO: 92 FL (ref 78–100)
MONOCYTES # BLD AUTO: 0.4 10E3/UL (ref 0–1.3)
MONOCYTES NFR BLD AUTO: 7 %
NEUTROPHILS # BLD AUTO: 3.4 10E3/UL (ref 1.6–8.3)
NEUTROPHILS NFR BLD AUTO: 59 %
NRBC # BLD AUTO: 0 10E3/UL
NRBC BLD AUTO-RTO: 0 /100
PLATELET # BLD AUTO: 196 10E3/UL (ref 150–450)
POTASSIUM SERPL-SCNC: 4.3 MMOL/L (ref 3.4–5.3)
PROT SERPL-MCNC: 6.3 G/DL (ref 6.4–8.3)
RBC # BLD AUTO: 4.78 10E6/UL (ref 4.4–5.9)
SODIUM SERPL-SCNC: 140 MMOL/L (ref 135–145)
WBC # BLD AUTO: 5.7 10E3/UL (ref 4–11)

## 2025-05-23 PROCEDURE — 96361 HYDRATE IV INFUSION ADD-ON: CPT

## 2025-05-23 PROCEDURE — 120N000004 HC R&B MS OVERFLOW

## 2025-05-23 PROCEDURE — 99222 1ST HOSP IP/OBS MODERATE 55: CPT | Performed by: STUDENT IN AN ORGANIZED HEALTH CARE EDUCATION/TRAINING PROGRAM

## 2025-05-23 PROCEDURE — 85004 AUTOMATED DIFF WBC COUNT: CPT | Performed by: INTERNAL MEDICINE

## 2025-05-23 PROCEDURE — 36415 COLL VENOUS BLD VENIPUNCTURE: CPT | Performed by: INTERNAL MEDICINE

## 2025-05-23 PROCEDURE — 80053 COMPREHEN METABOLIC PANEL: CPT | Performed by: INTERNAL MEDICINE

## 2025-05-23 PROCEDURE — 74019 RADEX ABDOMEN 2 VIEWS: CPT

## 2025-05-23 PROCEDURE — G0378 HOSPITAL OBSERVATION PER HR: HCPCS

## 2025-05-23 PROCEDURE — 99232 SBSQ HOSP IP/OBS MODERATE 35: CPT | Performed by: HOSPITALIST

## 2025-05-23 PROCEDURE — 250N000011 HC RX IP 250 OP 636: Performed by: INTERNAL MEDICINE

## 2025-05-23 PROCEDURE — 250N000013 HC RX MED GY IP 250 OP 250 PS 637: Performed by: INTERNAL MEDICINE

## 2025-05-23 PROCEDURE — 258N000003 HC RX IP 258 OP 636: Performed by: INTERNAL MEDICINE

## 2025-05-23 RX ORDER — NALOXONE HYDROCHLORIDE 0.4 MG/ML
0.4 INJECTION, SOLUTION INTRAMUSCULAR; INTRAVENOUS; SUBCUTANEOUS
Status: DISCONTINUED | OUTPATIENT
Start: 2025-05-23 | End: 2025-05-24 | Stop reason: HOSPADM

## 2025-05-23 RX ORDER — NALOXONE HYDROCHLORIDE 0.4 MG/ML
0.2 INJECTION, SOLUTION INTRAMUSCULAR; INTRAVENOUS; SUBCUTANEOUS
Status: DISCONTINUED | OUTPATIENT
Start: 2025-05-23 | End: 2025-05-24 | Stop reason: HOSPADM

## 2025-05-23 RX ORDER — HYDROMORPHONE HYDROCHLORIDE 1 MG/ML
0.5 INJECTION, SOLUTION INTRAMUSCULAR; INTRAVENOUS; SUBCUTANEOUS
Status: DISCONTINUED | OUTPATIENT
Start: 2025-05-23 | End: 2025-05-24 | Stop reason: HOSPADM

## 2025-05-23 RX ADMIN — ENOXAPARIN SODIUM 40 MG: 40 INJECTION SUBCUTANEOUS at 23:07

## 2025-05-23 RX ADMIN — SODIUM CHLORIDE: 0.9 INJECTION, SOLUTION INTRAVENOUS at 00:51

## 2025-05-23 RX ADMIN — ACETAMINOPHEN 650 MG: 325 TABLET, FILM COATED ORAL at 19:32

## 2025-05-23 RX ADMIN — ENOXAPARIN SODIUM 40 MG: 40 INJECTION SUBCUTANEOUS at 00:51

## 2025-05-23 RX ADMIN — SODIUM CHLORIDE: 0.9 INJECTION, SOLUTION INTRAVENOUS at 16:23

## 2025-05-23 RX ADMIN — POLYETHYLENE GLYCOL 3350 17 G: 17 POWDER, FOR SOLUTION ORAL at 08:29

## 2025-05-23 ASSESSMENT — ACTIVITIES OF DAILY LIVING (ADL)
ADLS_ACUITY_SCORE: 46
ADLS_ACUITY_SCORE: 20
ADLS_ACUITY_SCORE: 20
ADLS_ACUITY_SCORE: 46
ADLS_ACUITY_SCORE: 20
ADLS_ACUITY_SCORE: 20
ADLS_ACUITY_SCORE: 46
ADLS_ACUITY_SCORE: 20
ADLS_ACUITY_SCORE: 46
ADLS_ACUITY_SCORE: 41
ADLS_ACUITY_SCORE: 46
ADLS_ACUITY_SCORE: 20
ADLS_ACUITY_SCORE: 46
ADLS_ACUITY_SCORE: 20
ADLS_ACUITY_SCORE: 46
ADLS_ACUITY_SCORE: 20

## 2025-05-23 NOTE — PLAN OF CARE
"Goal Outcome Evaluation:      Plan of Care Reviewed With: patient    Overall Patient Progress: no changeOverall Patient Progress: no change    Outcome Evaluation: Pt up ad tita, IV infusing, clear liquid, denies pain and feels much better, possible gastrograffin in am but held now as no obstruction on xray.          Problem: Adult Inpatient Plan of Care  Goal: Plan of Care Review  Description: The Plan of Care Review/Shift note should be completed every shift.  The Outcome Evaluation is a brief statement about your assessment that the patient is improving, declining, or no change.  This information will be displayed automatically on your shift  note.  Outcome: Progressing  Flowsheets (Taken 5/23/2025 1746)  Outcome Evaluation: Pt up ad tita, IV infusing, clear liquid, denies pain and feels much better, possible gastrograffin in am but held now as no obstruction on xray.  Plan of Care Reviewed With: patient  Overall Patient Progress: no change  Goal: Patient-Specific Goal (Individualized)  Description: You can add care plan individualizations to a care plan. Examples of Individualization might be:  \"Parent requests to be called daily at 9am for status\", \"I have a hard time hearing out of my right ear\", or \"Do not touch me to wake me up as it startles  me\".  Outcome: Progressing  Goal: Absence of Hospital-Acquired Illness or Injury  Outcome: Progressing  Intervention: Identify and Manage Fall Risk  Recent Flowsheet Documentation  Taken 5/23/2025 1631 by Mercedez Raymond RN  Safety Promotion/Fall Prevention:   safety round/check completed   nonskid shoes/slippers when out of bed   patient and family education  Intervention: Prevent Skin Injury  Recent Flowsheet Documentation  Taken 5/23/2025 1631 by Mercedez Raymond RN  Body Position: position changed independently  Goal: Optimal Comfort and Wellbeing  Outcome: Progressing  Goal: Readiness for Transition of Care  Outcome: Progressing  Intervention: Mutually " Develop Transition Plan  Recent Flowsheet Documentation  Taken 5/23/2025 1500 by Mercedez Raymond, RN  Equipment Currently Used at Home: none     Problem: Skin Injury Risk Increased  Goal: Skin Health and Integrity  Outcome: Progressing  Intervention: Optimize Skin Protection  Recent Flowsheet Documentation  Taken 5/23/2025 1631 by Mercedez Raymond, RN  Activity Management: activity adjusted per tolerance  Head of Bed (HOB) Positioning: HOB at 20-30 degrees

## 2025-05-23 NOTE — CONSULTS
General Surgery Consultation    Nahum Chiu MRN# [id number]   Age: 44 year old YOB: 1980     Date of Admission:  5/22/2025    Reason for consult:            Abdominal pain       Requesting physician:            Abram                Assessment and Plan:   Assessment:   Nahum Chiu is a 44 year old male with history of open right inguinal hernia repair and a small bowel obstruction with one day of symptoms. Likely tissue band as the source but unlikely to be related to his IHR. Low on differential is tumor given lack of concerning imaging and no symptoms. Patient already passing gas and pain significantly improved.    Comorbidities:   has a past medical history of Anxiety (12/2021), Environmental allergies, and GERD (gastroesophageal reflux disease).      Plan:   Continue conservative management with bowel rest, npo, IVF,  Trial PO gastrograffin  Surgical intervention may be needed if the clinical picture worsens or if the patient fails to progress with conservative measure.          Chief Complaint:   Abdominal pain    History is obtained from the patient         History of Present Illness:   Nahum Chiu is a 44 year old  male who presents with abdominal pain periumbilical for the past 1 day.  The pain is constant and cramping.  Associated symptoms include with nausea, vomiting, and constipation.  Negative for associated symptoms of fevers, chills.  Last bowel movement was yesterday morning.  Last flatus was this morning.  At baseline BMs are regular with metamucil.  He has had a colonoscopy.  He does not have a history of bowel obstructions in the past.            Past Medical History:    has a past medical history of Anxiety (12/2021), Environmental allergies, and GERD (gastroesophageal reflux disease).          Past Surgical History:     Past Surgical History:   Procedure Laterality Date    ADENOIDECTOMY  01/2022    ESOPHAGOSCOPY, GASTROSCOPY, DUODENOSCOPY (EGD), COMBINED N/A  2020    Procedure: ESOPHAGOGASTRODUODENOSCOPY, WITH BIOPSY;  Surgeon: Kendell Riley MD;  Location:  GI    HERNIA REPAIR      SINUS SURGERY  2022             Social History:     Social History     Tobacco Use    Smoking status: Former     Current packs/day: 0.00     Average packs/day: 1 pack/day for 13.0 years (13.0 ttl pk-yrs)     Types: Cigarettes, Cigars     Start date: 1996     Quit date: 2009     Years since quittin.3    Smokeless tobacco: Never   Substance Use Topics    Alcohol use: Yes     Comment: minimal use             Family History:   Family history reviewed and is not pertinent.         Allergies:   All allergies reviewed and addressed          Medications:     Current Facility-Administered Medications   Medication Dose Route Frequency Provider Last Rate Last Admin    acetaminophen (TYLENOL) tablet 650 mg  650 mg Oral Q4H PRN Kiara Saul MD        Or    acetaminophen (TYLENOL) Suppository 650 mg  650 mg Rectal Q4H PRN Kiara Saul MD        benzocaine-menthol (CHLORASEPTIC) 6-10 MG lozenge 1 lozenge  1 lozenge Buccal Q1H PRN Kiara Saul MD        calcium carbonate (TUMS) chewable tablet 1,000 mg  1,000 mg Oral 4x Daily PRN Kiara Saul MD        diatrizoate meglumine-sodium (GASTROGRAFIN) 120 mL in sterile water (bottle) 150 mL  150 mL Oral Once Cich, Laila Mcclellan MD        enoxaparin ANTICOAGULANT (LOVENOX) injection 40 mg  40 mg Subcutaneous Q24H Kiara Saul MD   40 mg at 25 0051    HYDROmorphone (DILAUDID) injection 1 mg  1 mg Intravenous Q4H PRN Kiara Saul MD        HYDROmorphone (PF) (DILAUDID) injection 0.5 mg  0.5 mg Intravenous Q3H PRN Kiara Saul MD        lidocaine (LMX4) cream   Topical Q1H PRN Kiara Saul MD        lidocaine 1 % 0.1-1 mL  0.1-1 mL Other Q1H PRN Kiara Saul MD        miconazole (MICATIN) 2 % powder   Topical BID PRN Kiara Saul MD        ondansetron (ZOFRAN ODT) ODT tab 4 mg  4 mg Oral Q6H PRN Kg,  MD Kiara        Or    ondansetron (ZOFRAN) injection 4 mg  4 mg Intravenous Q6H PRN Kiara Saul MD        polyethylene glycol (MIRALAX) Packet 17 g  17 g Oral Daily Kiara Saul MD   17 g at 05/23/25 0829    prochlorperazine (COMPAZINE) injection 10 mg  10 mg Intravenous Q6H PRN Kiara Saul MD        Or    prochlorperazine (COMPAZINE) tablet 10 mg  10 mg Oral Q6H PRN Kiara Saul MD        senna-docusate (SENOKOT-S/PERICOLACE) 8.6-50 MG per tablet 1 tablet  1 tablet Oral BID PRN Kiara Saul MD        Or    senna-docusate (SENOKOT-S/PERICOLACE) 8.6-50 MG per tablet 2 tablet  2 tablet Oral BID PRN Kiaar Saul MD        sodium chloride (PF) 0.9% PF flush 3 mL  3 mL Intracatheter Q8H Kiara Saul MD   3 mL at 05/23/25 0055    sodium chloride (PF) 0.9% PF flush 3 mL  3 mL Intracatheter q1 min prn Kiara Saul MD        sodium chloride 0.9 % infusion   Intravenous Continuous Kiara Saul  mL/hr at 05/23/25 0830 Rate Verify at 05/23/25 0830     Current Outpatient Medications   Medication Sig Dispense Refill    fluticasone (FLONASE) 50 MCG/ACT nasal spray Spray 2 sprays into both nostrils as needed.      pantoprazole (PROTONIX) 40 MG EC tablet Take 40 mg by mouth daily.      venlafaxine (EFFEXOR XR) 37.5 MG 24 hr capsule Take 1 capsule (37.5 mg) by mouth daily. 90 capsule 5     Current Facility-Administered Medications   Medication Dose Route Frequency Provider Last Rate Last Admin    diatrizoate meglumine-sodium (GASTROGRAFIN) 120 mL in sterile water (bottle) 150 mL  150 mL Oral Once Laila Tee MD        enoxaparin ANTICOAGULANT (LOVENOX) injection 40 mg  40 mg Subcutaneous Q24H Kiara Saul MD   40 mg at 05/23/25 0051    polyethylene glycol (MIRALAX) Packet 17 g  17 g Oral Daily Kiara Saul MD   17 g at 05/23/25 0829    sodium chloride (PF) 0.9% PF flush 3 mL  3 mL Intracatheter Q8H Kiara Saul MD   3 mL at 05/23/25 0055            Review of Systems:   The 10 point  review of systems is negative other than noted in the HPI.          Physical Exam:   /85   Pulse 64   Temp 97.6  F (36.4  C) (Oral)   Resp 18   Wt 98 kg (216 lb 0.8 oz)   SpO2 96%   BMI 29.30 kg/m    General - Well developed, well nourished male in no apparent distress  HEENT:  Head normocephalic and atraumatic, pupils equal and round, conjunctivae clear, no scleral icterus, mucous membranes moist, external ears and nose normal  Neck: Supple without thyromegaly or masses  Lymphatic: No cervical, or supraclavicular lymphadenopathy  Lungs: Clear to auscultation bilaterally  Heart: regular rate and rhythm, no murmurs  Abdomen:   soft, rounded, mildly distended with mild tenderness noted periumbilically.  No rebound or guarding.  no masses palpated.  Extremities: Warm without edema  Neurologic: nonfocal  Psychiatric: Mood and affect appropriate  Skin: Without lesions, rashes, or juandice         Data:     Lab Results   Component Value Date    WBC 5.7 05/23/2025    WBC 6.4 08/17/2020     Lab Results   Component Value Date    HGB 14.9 05/23/2025    HGB 16.1 08/17/2020     Lab Results   Component Value Date     05/23/2025     08/17/2020     Last Basic Metabolic Panel:  Lab Results   Component Value Date     05/23/2025     08/17/2020      Lab Results   Component Value Date    POTASSIUM 4.3 05/23/2025    POTASSIUM 4.0 06/06/2022    POTASSIUM 4.2 08/17/2020     Lab Results   Component Value Date    CHLORIDE 104 05/23/2025    CHLORIDE 108 06/06/2022    CHLORIDE 105 08/17/2020     Lab Results   Component Value Date    ANA 9.3 05/23/2025    ANA 8.8 08/17/2020     Lab Results   Component Value Date    CO2 27 05/23/2025    CO2 25 06/06/2022    CO2 23 08/17/2020     Lab Results   Component Value Date    BUN 13.0 05/23/2025    BUN 15 06/06/2022    BUN 12 08/17/2020     Lab Results   Component Value Date    CR 0.98 05/23/2025    CR 0.97 08/17/2020     Lab Results   Component Value Date    GLC 97  05/23/2025     06/06/2022    GLC 93 08/17/2020         Imaging:  All imaging studies reviewed by me.    Results for orders placed or performed during the hospital encounter of 05/22/25   CT Abdomen Pelvis w Contrast    Narrative    EXAM: CT ABDOMEN PELVIS W CONTRAST  LOCATION: [facility]  DATE: 5/22/2025    INDICATION: Progressive upper abdominal pain.  COMPARISON: None available.  TECHNIQUE: CT scan of the abdomen and pelvis was performed after the injection of 100mL Isovue 370 intravenously. Multiplanar reformats were obtained. Dose reduction techniques were used.    FINDINGS:   LOWER CHEST: Basilar pulmonary opacities, likely atelectasis.    ABDOMEN/PELVIS:    HEPATOBILIARY: No suspicious focal hepatic lesion. No radiodense gallstones.    PANCREAS: No main pancreatic ductal dilatation or definite solid pancreatic mass.    SPLEEN: No splenomegaly.    ADRENAL GLANDS: No adrenal nodules.    KIDNEYS/BLADDER: No radiodense kidney/ureteral stones or hydronephrosis in either kidney.    BOWEL: The appendix is visualized and appears normal. Multiple dilated small bowel loops with transition point in the right midabdomen (series 3 image 130), findings worrisome for small bowel obstruction.    PERITONEUM: No evidence of free fluid in the abdomen and pelvis. No free peritoneal or portal venous gas.    PELVIC ORGANS: Unremarkable.    VASCULATURE: Unremarkable.    LYMPH NODES: No significant abdominopelvic lymphadenopathy.    MUSCULOSKELETAL: No suspicious osseous lesion.      Impression    IMPRESSION:  1.  Multiple dilated small bowel loops with transition point in the right mid abdomen, findings worrisome for small bowel obstruction.     XR Abdomen 2 Views    Narrative    EXAM: XR ABDOMEN 2 VIEWS  LOCATION: [facility]  DATE: 5/23/2025    INDICATION: SBO follow up. Clinically better  COMPARISON: CT abdomen pelvis 5/22/2025      Impression    IMPRESSION: The bowel gas  pattern is normal. Mild colonic fecal burden. Nothing for obstruction or free air. No evidence for renal stones. Pelvic phleboliths. No aggressive osseous lesion.       This note was created using voice recognition software. Undetected word substitutions or other errors may have occurred.     Laila Tee MD    Time spent with the patient, reviewing the EMR, reviewing laboratory and imaging studies, more than 50% of which was counseling and coordinating care:  30 minutes.

## 2025-05-23 NOTE — PROGRESS NOTES
[facility]    Medicine Progress Note - Hospitalist Service    Date of Admission:  5/22/2025    Assessment & Plan   Nahum Chiu is a 44 year old male admitted on 5/22/2025. He has a past medical history of acid reflux disease, status post endoscopy recently, anxiety disorder.     Patient is being admitted to the hospital with complaints of abdominal pain and is noted to have a small bowel obstruction  1  Small bowel obstruction, mechanical in nature.  No prior history of such an event.  IV fluids, conservative, management and ADAT  No indication of NG tube at this time    General Surgery consulted.  Plan for Gastrografin.  2.  Other chronic medical conditions, PTA meds when available should be resumed.  Anticipate eventual discharge home barring any unforeseen circumstance        Diet: Clear Liquid Diet    DVT Prophylaxis: Pneumatic Compression Devices  [place] Catheter: Not present  Lines: None     Cardiac Monitoring: None  Code Status: Full Code      Clinically Significant Risk Factors Present on Admission                             # Overweight: Estimated body mass index is 28.59 kg/m  as calculated from the following:    Height as of this encounter: 1.829 m (6').    Weight as of this encounter: 95.6 kg (210 lb 12.8 oz).              Social Drivers of Health    Tobacco Use: Medium Risk (3/24/2025)    Patient History     Smoking Tobacco Use: Former     Smokeless Tobacco Use: Never   Social Connections: Unknown (8/21/2024)    Social Connection and Isolation Panel [NHANES]     Frequency of Social Gatherings with Friends and Family: Twice a week          Disposition Plan     Medically Ready for Discharge: Anticipated Tomorrow     Swapnil Valverde MD  Hospitalist Service  [facility]  Securely message with [employer] (more info)  Text page via [employer] Paging/Directory   ______________________________________________________________________    Interval History   Improving, no  pain and farting     Physical Exam   Vital Signs: Temp: 97.9  F (36.6  C) Temp src: Oral BP: 112/71 Pulse: 63   Resp: 16 SpO2: 95 % O2 Device: None (Room air)    Weight: 210 lbs 12.8 oz    Constitutional: awake, alert, cooperative, no apparent distress, and appears stated age  Cardiovascular: Normal apical impulse, regular rate and rhythm, normal S1 and S2, no S3 or S4, and no murmur noted  GI: No scars, normal bowel sounds, soft, non-distended, non-tender, no masses palpated, no hepatosplenomegally    Medical Decision Making       38 MINUTES SPENT BY ME on the date of service doing chart review, history, exam, documentation & further activities per the note.      Data     I have personally reviewed the following data over the past 24 hrs:    5.7  \   14.9   / 196     140 104 13.0 /  97   4.3 27 0.98 \     ALT: 16 AST: 16 AP: 42 TBILI: 0.8   ALB: 3.8 TOT PROTEIN: 6.3 (L) LIPASE: 26     Procal: N/A CRP: N/A Lactic Acid: 0.7         Imaging results reviewed over the past 24 hrs:   Recent Results (from the past 24 hours)   CT Abdomen Pelvis w Contrast    Narrative    EXAM: CT ABDOMEN PELVIS W CONTRAST  LOCATION: [facility]  DATE: 5/22/2025    INDICATION: Progressive upper abdominal pain.  COMPARISON: None available.  TECHNIQUE: CT scan of the abdomen and pelvis was performed after the injection of 100mL Isovue 370 intravenously. Multiplanar reformats were obtained. Dose reduction techniques were used.    FINDINGS:   LOWER CHEST: Basilar pulmonary opacities, likely atelectasis.    ABDOMEN/PELVIS:    HEPATOBILIARY: No suspicious focal hepatic lesion. No radiodense gallstones.    PANCREAS: No main pancreatic ductal dilatation or definite solid pancreatic mass.    SPLEEN: No splenomegaly.    ADRENAL GLANDS: No adrenal nodules.    KIDNEYS/BLADDER: No radiodense kidney/ureteral stones or hydronephrosis in either kidney.    BOWEL: The appendix is visualized and appears normal. Multiple dilated small  bowel loops with transition point in the right midabdomen (series 3 image 130), findings worrisome for small bowel obstruction.    PERITONEUM: No evidence of free fluid in the abdomen and pelvis. No free peritoneal or portal venous gas.    PELVIC ORGANS: Unremarkable.    VASCULATURE: Unremarkable.    LYMPH NODES: No significant abdominopelvic lymphadenopathy.    MUSCULOSKELETAL: No suspicious osseous lesion.      Impression    IMPRESSION:  1.  Multiple dilated small bowel loops with transition point in the right mid abdomen, findings worrisome for small bowel obstruction.     XR Abdomen 2 Views    Narrative    EXAM: XR ABDOMEN 2 VIEWS  LOCATION: [facility]  DATE: 5/23/2025    INDICATION: SBO follow up. Clinically better  COMPARISON: CT abdomen pelvis 5/22/2025      Impression    IMPRESSION: The bowel gas pattern is normal. Mild colonic fecal burden. Nothing for obstruction or free air. No evidence for renal stones. Pelvic phleboliths. No aggressive osseous lesion.

## 2025-05-23 NOTE — PHARMACY-ADMISSION MEDICATION HISTORY
Pharmacist Admission Medication History    Admission medication history is complete. The information provided in this note is only as accurate as the sources available at the time of the update.    Information Source(s): Patient via in-person    Pertinent Information:     Changes made to PTA medication list:  Added: None  Deleted: None  Changed: protonix to daily    Allergies reviewed with patient and updates made in EHR: no    Medication History Completed By: Herber Hardin RPH 5/22/2025 11:41 PM    PTA Med List   Medication Sig Last Dose/Taking    fluticasone (FLONASE) 50 MCG/ACT nasal spray Spray 2 sprays into both nostrils as needed. Taking As Needed    pantoprazole (PROTONIX) 40 MG EC tablet Take 40 mg by mouth daily. 5/22/2025 Morning    venlafaxine (EFFEXOR XR) 37.5 MG 24 hr capsule Take 1 capsule (37.5 mg) by mouth daily. 5/22/2025 Morning

## 2025-05-23 NOTE — ED NOTES
[facility]  ED Nurse Handoff Report    ED Chief complaint: Abdominal Pain  . ED Diagnosis:   Final diagnoses:   None       Allergies: No Known Allergies    Code Status: Full Code    Activity level - Baseline/Home:  independent.  Activity Level - Current:   independent.   Lift room needed: No.   Bariatric: No   Needed: No   Isolation: Yes.   Infection: Not Applicable.     Respiratory status: Room air    Vital Signs (within 30 minutes):   Vitals:    05/22/25 2031   BP: (!) 118/97   Pulse: 83   Resp: 18   Temp: 97.8  F (36.6  C)   TempSrc: Temporal   SpO2: 100%   Weight: 98 kg (216 lb 0.8 oz)       Cardiac Rhythm:  ,      Pain level:    Patient confused: No.   Patient Falls Risk: patient and family education.   Elimination Status: Has voided     Patient Report - Initial Complaint: .   Pt reports upper abd pain since 1530 today. Went to , labs drawn and pt sent home. Tried tums and pepcid without relief. Denies constipation, diarrhea, nausea. VSS.     Focused Assessment: abd pain    Abnormal Results:   Labs Ordered and Resulted from Time of ED Arrival to Time of ED Departure   BASIC METABOLIC PANEL - Abnormal       Result Value    Sodium 141      Potassium 4.4      Chloride 103      Carbon Dioxide (CO2) 27      Anion Gap 11      Urea Nitrogen 17.1      Creatinine 1.06      GFR Estimate 88      Calcium 9.8      Glucose 115 (*)    ROUTINE UA WITH MICROSCOPIC REFLEX TO CULTURE - Abnormal    Color Urine Yellow      Appearance Urine Clear      Glucose Urine Negative      Bilirubin Urine Negative      Ketones Urine Negative      Specific Gravity Urine 1.032      Blood Urine Negative      pH Urine 7.0      Protein Albumin Urine 20 (*)     Urobilinogen Urine 2.0 (*)     Nitrite Urine Negative      Leukocyte Esterase Urine Negative      Mucus Urine Present (*)     RBC Urine 1      WBC Urine 1      Squamous Epithelials Urine <1     CBC WITH PLATELETS - Normal    WBC Count 8.6      RBC Count  5.11      Hemoglobin 15.8      Hematocrit 46.3      MCV 91      MCH 30.9      MCHC 34.1      RDW 12.5      Platelet Count 226     HEPATIC FUNCTION PANEL - Normal    Protein Total 7.5      Albumin 4.8      Bilirubin Total 0.5      Alkaline Phosphatase 50      AST 21      ALT 19      Bilirubin Direct 0.13     LIPASE - Normal    Lipase 26          CT Abdomen Pelvis w Contrast   Preliminary Result   IMPRESSION:   1.  Multiple dilated small bowel loops with transition point in the right mid abdomen, findings worrisome for small bowel obstruction.             Treatments provided: see MAR  Family Comments:   OBS brochure/video discussed/provided to patient:  N/A  ED Medications:   Medications   sodium chloride 0.9% BOLUS 1,000 mL (1,000 mLs Intravenous $New Bag 5/22/25 2105)   oxyCODONE-acetaminophen (PERCOCET) 5-325 MG per tablet 2 tablet (2 tablets Oral $Given 5/22/25 2104)   sodium chloride 0.9 % bag for CT scan flush (65 mLs Intravenous $Given 5/22/25 2211)   iopamidol (ISOVUE-370) solution 500 mL (100 mLs Intravenous $Given 5/22/25 2211)       Drips infusing:  No  For the majority of the shift this patient was Green.   Interventions performed were .    Sepsis treatment initiated: No    Cares/treatment/interventions/medications to be completed following ED care: all admit orders    ED Nurse Name: Nanci Gómez RN  10:40 PM    RECEIVING UNIT ED HANDOFF REVIEW    Above ED Nurse Handoff Report was reviewed: Yes  Reviewed by: Spring Pacheco RN on May 23, 2025 at 10:27 AM

## 2025-05-23 NOTE — ED NOTES
[facility]    ED Boarding Nurse Handoff Addendum Report:    Date/time: 5/23/2025, 6:58 AM    Neuro: Alert and Oriented x4  Activity: are independent with no assistive devices   Telemetry Monitoring: No  Pain: complaining of 2/10 pain in their abdomen.  Declines need for intervention.  LDA's: Peripheral  Fluids: has Normal Saline 0.9% running at 100 mL per hour.  Diet: NPO  Living Situation: from home  Discharge Disposition: TBD    Plan of Care:  pain management    Vital signs (within last 30 minutes):    Vitals:    05/22/25 2031 05/23/25 0000   BP: (!) 118/97 132/83   Pulse: 83    Resp: 18    Temp: 97.8  F (36.6  C)    TempSrc: Temporal    SpO2: 100% 94%   Weight: 98 kg (216 lb 0.8 oz)        ED Boarding Nurse name: Jeana Arrieta RN

## 2025-05-23 NOTE — ED PROVIDER NOTES
Emergency Department Note      History of Present Illness     Chief Complaint   Abdominal Pain      HPI   Nahum Chiu is a 44 year old male who presents with wife for evluation of central upper abdominal pain that started around 330pm while at work. Pt takes acid reflux meds and thought maybe his reflux was flaring up. Sx didn't go away so he stopped to buy some tums, which didn't help. He then went to  and labs were drawn (but not resulted) and was discharged. Sx progressed. He tried to make himself through up without relief. He also feel more bloated than usual. Pain also seemed to radiated to BL flanks at once. Normal BM this AM and still passing gas. No black or blood in stool. No prior abdominal surgeries. Pt drinks a few etoh drinks per week. Pt doesn't use nsaids much. Pt takes pantoprazole 40mg daily for GERD. Pt has had previous R inguinal hernia repair at age 16-17.     Independent Historian   None    Review of External Notes   3/26/2020 EGD:    Impression:               - Z-line irregular, 40 cm from the incisors.                             Biopsied.                             - Gastroesophageal flap valve classified as Hill                             Grade I (prominent fold, tight to endoscope).                             - No gross lesions in the stomach. Biopsied.                             - Normal examined duodenum.   Recommendation:           - Await pathology results.                             - Return to referring physician after studies are                             complete.     Past Medical History     Medical History and Problem List   Past Medical History:   Diagnosis Date    Anxiety 12/2021    Environmental allergies     GERD (gastroesophageal reflux disease)        Medications   fluticasone (FLONASE) 50 MCG/ACT nasal spray  pantoprazole (PROTONIX) 40 MG EC tablet  venlafaxine (EFFEXOR XR) 37.5 MG 24 hr capsule        Surgical History   Past Surgical History:   Procedure  Laterality Date    ADENOIDECTOMY  01/2022    ESOPHAGOSCOPY, GASTROSCOPY, DUODENOSCOPY (EGD), COMBINED N/A 03/26/2020    Procedure: ESOPHAGOGASTRODUODENOSCOPY, WITH BIOPSY;  Surgeon: Kendell Riley MD;  Location:  GI    HERNIA REPAIR  1995    SINUS SURGERY  01/2022       Physical Exam     Patient Vitals for the past 24 hrs:   BP Temp Temp src Pulse Resp SpO2 Weight   05/23/25 0000 132/83 -- -- -- -- 94 % --   05/22/25 2031 (!) 118/97 97.8  F (36.6  C) Temporal 83 18 100 % 98 kg (216 lb 0.8 oz)     Physical Exam  VS: Reviewed per above  HENT: Mucous membranes moist  EYES: sclera anicteric  CV: Rate as noted,  regular rhythm.   RESP: Effort normal. Breath sounds are normal bilaterally.  GI: moderate periumbilical tenderness without rebound/guarding, not distended.  NEURO: Alert, moving all extremities  MSK: No deformity of the extremities  SKIN: Warm and dry    Diagnostics     Lab Results   Labs Ordered and Resulted from Time of ED Arrival to Time of ED Departure   BASIC METABOLIC PANEL - Abnormal       Result Value    Sodium 141      Potassium 4.4      Chloride 103      Carbon Dioxide (CO2) 27      Anion Gap 11      Urea Nitrogen 17.1      Creatinine 1.06      GFR Estimate 88      Calcium 9.8      Glucose 115 (*)    ROUTINE UA WITH MICROSCOPIC REFLEX TO CULTURE - Abnormal    Color Urine Yellow      Appearance Urine Clear      Glucose Urine Negative      Bilirubin Urine Negative      Ketones Urine Negative      Specific Gravity Urine 1.032      Blood Urine Negative      pH Urine 7.0      Protein Albumin Urine 20 (*)     Urobilinogen Urine 2.0 (*)     Nitrite Urine Negative      Leukocyte Esterase Urine Negative      Mucus Urine Present (*)     RBC Urine 1      WBC Urine 1      Squamous Epithelials Urine <1     CBC WITH PLATELETS - Normal    WBC Count 8.6      RBC Count 5.11      Hemoglobin 15.8      Hematocrit 46.3      MCV 91      MCH 30.9      MCHC 34.1      RDW 12.5      Platelet Count 226     HEPATIC FUNCTION  PANEL - Normal    Protein Total 7.5      Albumin 4.8      Bilirubin Total 0.5      Alkaline Phosphatase 50      AST 21      ALT 19      Bilirubin Direct 0.13     LIPASE - Normal    Lipase 26     LACTIC ACID WHOLE BLOOD WITH 1X REPEAT IN 2 HR WHEN >2 - Normal    Lactic Acid, Initial 0.7         Imaging   CT Abdomen Pelvis w Contrast   Final Result   IMPRESSION:   1.  Multiple dilated small bowel loops with transition point in the right mid abdomen, findings worrisome for small bowel obstruction.             EKG     Independent Interpretation   None    ED Course      Medications Administered   Medications   lidocaine 1 % 0.1-1 mL (has no administration in time range)   lidocaine (LMX4) cream (has no administration in time range)   sodium chloride (PF) 0.9% PF flush 3 mL (3 mLs Intracatheter $Given 5/23/25 0055)   sodium chloride (PF) 0.9% PF flush 3 mL (has no administration in time range)   acetaminophen (TYLENOL) tablet 650 mg (has no administration in time range)     Or   acetaminophen (TYLENOL) Suppository 650 mg (has no administration in time range)   polyethylene glycol (MIRALAX) Packet 17 g (has no administration in time range)   senna-docusate (SENOKOT-S/PERICOLACE) 8.6-50 MG per tablet 1 tablet (has no administration in time range)     Or   senna-docusate (SENOKOT-S/PERICOLACE) 8.6-50 MG per tablet 2 tablet (has no administration in time range)   ondansetron (ZOFRAN ODT) ODT tab 4 mg (has no administration in time range)     Or   ondansetron (ZOFRAN) injection 4 mg (has no administration in time range)   prochlorperazine (COMPAZINE) injection 10 mg (has no administration in time range)     Or   prochlorperazine (COMPAZINE) tablet 10 mg (has no administration in time range)   calcium carbonate (TUMS) chewable tablet 1,000 mg (has no administration in time range)   benzocaine-menthol (CHLORASEPTIC) 6-10 MG lozenge 1 lozenge (has no administration in time range)   miconazole (MICATIN) 2 % powder (has no  administration in time range)   sodium chloride 0.9 % infusion ( Intravenous $New Bag 5/23/25 0051)   enoxaparin ANTICOAGULANT (LOVENOX) injection 40 mg (40 mg Subcutaneous $Given 5/23/25 0051)   HYDROmorphone (PF) (DILAUDID) injection 0.5 mg (has no administration in time range)   HYDROmorphone (DILAUDID) injection 1 mg (has no administration in time range)   sodium chloride 0.9% BOLUS 1,000 mL (0 mLs Intravenous Stopped 5/23/25 0011)   oxyCODONE-acetaminophen (PERCOCET) 5-325 MG per tablet 2 tablet (2 tablets Oral $Given 5/22/25 2104)   sodium chloride 0.9 % bag for CT scan flush (65 mLs Intravenous $Given 5/22/25 2211)   iopamidol (ISOVUE-370) solution 500 mL (100 mLs Intravenous $Given 5/22/25 2211)       Procedures   Procedures     Discussion of Management   None    ED Course   ED Course as of 05/23/25 0223   Thu May 22, 2025   2246 I spoke with Dr Perry- general surgery. Unless lactic acid elevated, no plans to consider ex lap at this time. NGT only if increased nausea/vomiting.        Additional Documentation  None    Medical Decision Making / Diagnosis     CMS Diagnoses: None    MIPS   None               [facility]   Nahum Chiu is a 44 year old male who presents to the ER for evaluation of upper abdominal pain today.  Symptoms have progressed since onset and thus he presented to the ER.  Vital signs reassuring but on exam he has moderate periumbilical tenderness.  No rebound or guarding.  CT abdomen is concerning for SBO with transition point.  No lactic acidosis or active vomiting.  Pain seems to improve with Percocet alone.  General surgery was consulted and did not have plans for any emergent ex lap.  In the absence of nausea or vomiting, NG tube was not recommended.  Patient was admitted to hospitalist team for further cares.    Disposition   The patient was admitted to the hospital.     Diagnosis     ICD-10-CM    1. SBO (small bowel obstruction) (H)  K56.609            Discharge Medications   New  Prescriptions    No medications on file                David Mejia MD  05/23/25 2531

## 2025-05-23 NOTE — H&P
[facility]    History and Physical - Hospitalist Service       Date of Admission:  5/22/2025    Assessment & Plan      Nahum Chiu is a 44 year old male admitted on 5/22/2025. He has a past medical history of acid reflux disease, status post endoscopy recently, anxiety disorder.    Patient is being admitted to the hospital with complaints of abdominal pain and is noted to have a small bowel obstruction  1/.  Small bowel obstruction, mechanical in nature.  No prior history of such an event.  The only abdominal surgeries had is history of inguinal hernia repair 30 years ago.  I do not see this is an acutely surgical abdomen.  IV fluids, pain management n.p.o. status  No indication of NG tube at this time he is not vomited even once  2/.  Other chronic medical conditions, PTA meds when available should be resumed.  Anticipate eventual discharge home barring any unforeseen circumstance        Diet:  N.p.o. except ice chips  DVT Prophylaxis: Enoxaparin (Lovenox) SQ  [place] Catheter: Not present  Lines: None     Cardiac Monitoring: None  Code Status:  Full code    Clinically Significant Risk Factors Present on Admission                             # Overweight: Estimated body mass index is 29.3 kg/m  as calculated from the following:    Height as of an earlier encounter on 5/22/25: 1.829 m (6').    Weight as of this encounter: 98 kg (216 lb 0.8 oz).              Disposition Plan     Medically Ready for Discharge: Anticipated in 2-4 Days           Kiara Saul MD  Hospitalist Service  [facility]  Securely message with [employer] (more info)  Text page via [email or web address] Paging/Directory     ______________________________________________________________________    Chief Complaint   Abdominal pain    History is obtained from the patient, electronic health record, and emergency department physician    History of Present Illness   Nahum Chiu is a 44 year old male who has a past  medical history of acid reflux disease, status post endoscopy recently, anxiety disorder.  Patient is being admitted to the emergency department where he presented with approximately 6 hours of lower/mid abdominal pain.  Patient was seen by myself in the emergency department he appears to be very comfortable and is little bit perplexed as to what may have caused his trouble.  He does acknowledge having eaten 2 carrots yesterday at lunchtime.  Other than that he has no major complaints whatsoever.  She specifically denies any nausea or vomiting.  Last bowel movement was about an hour prior to coming in, last time he did any housework was more than a week ago..  He is employed as a  does not smoke rarely consume alcohol      Past Medical History    Past Medical History:   Diagnosis Date    Anxiety 12/2021    on medication in 20's but not for a long time - seeing therapist now    Environmental allergies     GERD (gastroesophageal reflux disease)        Past Surgical History   Past Surgical History:   Procedure Laterality Date    ADENOIDECTOMY  01/2022    ESOPHAGOSCOPY, GASTROSCOPY, DUODENOSCOPY (EGD), COMBINED N/A 03/26/2020    Procedure: ESOPHAGOGASTRODUODENOSCOPY, WITH BIOPSY;  Surgeon: Kendell Riley MD;  Location:  GI    HERNIA REPAIR  1995    SINUS SURGERY  01/2022       Prior to Admission Medications   Prior to Admission Medications   Prescriptions Last Dose Informant Patient Reported? Taking?   fluticasone (FLONASE) 50 MCG/ACT nasal spray   Yes Yes   Sig: Spray 2 sprays into both nostrils as needed.   pantoprazole (PROTONIX) 40 MG EC tablet 5/22/2025 Morning  Yes Yes   Sig: Take 40 mg by mouth daily.   venlafaxine (EFFEXOR XR) 37.5 MG 24 hr capsule 5/22/2025 Morning  No Yes   Sig: Take 1 capsule (37.5 mg) by mouth daily.      Facility-Administered Medications: None        Review of Systems    The 10 point Review of Systems is negative other than noted in the HPI or here.     Social History   I have  reviewed this patient's social history and updated it with pertinent information if needed.  Social History     Tobacco Use    Smoking status: Former     Current packs/day: 0.00     Average packs/day: 1 pack/day for 13.0 years (13.0 ttl pk-yrs)     Types: Cigarettes, Cigars     Start date: 1996     Quit date: 2009     Years since quittin.3    Smokeless tobacco: Never   Vaping Use    Vaping status: Never Used   Substance Use Topics    Alcohol use: Yes     Comment: minimal use    Drug use: No         Family History   I have reviewed this patient's family history and updated it with pertinent information if needed.  Family History   Problem Relation Age of Onset    Lupus Mother     Brain Tumor Father         benign brain tumor    Alzheimer Disease Maternal Grandmother     Diabetes No family hx of          Allergies   No Known Allergies     Physical Exam   Vital Signs: Temp: 97.8  F (36.6  C) Temp src: Temporal BP: (!) 118/97 Pulse: 83   Resp: 18 SpO2: 100 % O2 Device: None (Room air)    Weight: 216 lbs .81 oz    General Appearance: Alert awake oriented x 3 appears to be in no acute distress  Respiratory: Clear to auscultation  Cardiovascular: S1-S2  GI: Soft nontender, BS to be full, hypoactive to missing bowel sounds tenderness on palpation of the mid abdomen  Skin: No rash or lesions  Other: No neurological deficits    Medical Decision Making       75 MINUTES SPENT BY ME on the date of service doing chart review, history, exam, documentation & further activities per the note.      Data   ------------------------- PAST 24 HR DATA REVIEWED -----------------------------------------------    I have personally reviewed the following data over the past 24 hrs:    8.6  \   15.8   / 226     141 103 17.1 /  115 (H)   4.4 27 1.06 \     ALT: 19 AST: 21 AP: 50 TBILI: 0.5   ALB: 4.8 TOT PROTEIN: 7.5 LIPASE: 26     Procal: N/A CRP: N/A Lactic Acid: 0.7         Imaging results reviewed over the past 24 hrs:   Recent  Results (from the past 24 hours)   CT Abdomen Pelvis w Contrast    Narrative    EXAM: CT ABDOMEN PELVIS W CONTRAST  LOCATION: [facility]  DATE: 5/22/2025    INDICATION: Progressive upper abdominal pain.  COMPARISON: None available.  TECHNIQUE: CT scan of the abdomen and pelvis was performed after the injection of 100mL Isovue 370 intravenously. Multiplanar reformats were obtained. Dose reduction techniques were used.    FINDINGS:   LOWER CHEST: Basilar pulmonary opacities, likely atelectasis.    ABDOMEN/PELVIS:    HEPATOBILIARY: No suspicious focal hepatic lesion. No radiodense gallstones.    PANCREAS: No main pancreatic ductal dilatation or definite solid pancreatic mass.    SPLEEN: No splenomegaly.    ADRENAL GLANDS: No adrenal nodules.    KIDNEYS/BLADDER: No radiodense kidney/ureteral stones or hydronephrosis in either kidney.    BOWEL: The appendix is visualized and appears normal. Multiple dilated small bowel loops with transition point in the right midabdomen (series 3 image 130), findings worrisome for small bowel obstruction.    PERITONEUM: No evidence of free fluid in the abdomen and pelvis. No free peritoneal or portal venous gas.    PELVIC ORGANS: Unremarkable.    VASCULATURE: Unremarkable.    LYMPH NODES: No significant abdominopelvic lymphadenopathy.    MUSCULOSKELETAL: No suspicious osseous lesion.      Impression    IMPRESSION:  1.  Multiple dilated small bowel loops with transition point in the right mid abdomen, findings worrisome for small bowel obstruction.

## 2025-05-23 NOTE — ED TRIAGE NOTES
Pt reports upper abd pain since 1530 today. Went to , labs drawn and pt sent home. Tried tums and pepcid without relief. Denies constipation, diarrhea, nausea. VSS.

## 2025-05-24 ENCOUNTER — RESULTS FOLLOW-UP (OUTPATIENT)
Dept: URGENT CARE | Facility: URGENT CARE | Age: 45
End: 2025-05-24

## 2025-05-24 VITALS
OXYGEN SATURATION: 95 % | HEART RATE: 62 BPM | WEIGHT: 210.8 LBS | BODY MASS INDEX: 28.55 KG/M2 | HEIGHT: 72 IN | DIASTOLIC BLOOD PRESSURE: 74 MMHG | SYSTOLIC BLOOD PRESSURE: 113 MMHG | RESPIRATION RATE: 16 BRPM | TEMPERATURE: 98.3 F

## 2025-05-24 LAB
ALBUMIN SERPL BCG-MCNC: 4.5 G/DL (ref 3.5–5.2)
ALP SERPL-CCNC: 48 U/L (ref 40–150)
ALT SERPL W P-5'-P-CCNC: 20 U/L (ref 0–70)
ANION GAP SERPL CALCULATED.3IONS-SCNC: 9 MMOL/L (ref 7–15)
AST SERPL W P-5'-P-CCNC: 21 U/L (ref 0–45)
BILIRUB SERPL-MCNC: 0.4 MG/DL
BUN SERPL-MCNC: 18.4 MG/DL (ref 6–20)
CALCIUM SERPL-MCNC: 9.6 MG/DL (ref 8.8–10.4)
CHLORIDE SERPL-SCNC: 105 MMOL/L (ref 98–107)
CREAT SERPL-MCNC: 1.03 MG/DL (ref 0.67–1.17)
EGFRCR SERPLBLD CKD-EPI 2021: >90 ML/MIN/1.73M2
GLUCOSE SERPL-MCNC: 103 MG/DL (ref 70–99)
HCO3 SERPL-SCNC: 26 MMOL/L (ref 22–29)
LIPASE SERPL-CCNC: 35 U/L (ref 13–60)
POTASSIUM SERPL-SCNC: 4.3 MMOL/L (ref 3.4–5.3)
PROT SERPL-MCNC: 7.4 G/DL (ref 6.4–8.3)
SODIUM SERPL-SCNC: 140 MMOL/L (ref 135–145)

## 2025-05-24 PROCEDURE — 258N000003 HC RX IP 258 OP 636: Performed by: INTERNAL MEDICINE

## 2025-05-24 PROCEDURE — 99231 SBSQ HOSP IP/OBS SF/LOW 25: CPT | Performed by: SURGERY

## 2025-05-24 PROCEDURE — G0378 HOSPITAL OBSERVATION PER HR: HCPCS

## 2025-05-24 PROCEDURE — 99239 HOSP IP/OBS DSCHRG MGMT >30: CPT | Performed by: HOSPITALIST

## 2025-05-24 PROCEDURE — 96361 HYDRATE IV INFUSION ADD-ON: CPT

## 2025-05-24 PROCEDURE — 250N000013 HC RX MED GY IP 250 OP 250 PS 637: Performed by: INTERNAL MEDICINE

## 2025-05-24 RX ADMIN — POLYETHYLENE GLYCOL 3350 17 G: 17 POWDER, FOR SOLUTION ORAL at 07:44

## 2025-05-24 RX ADMIN — SODIUM CHLORIDE: 0.9 INJECTION, SOLUTION INTRAVENOUS at 02:22

## 2025-05-24 ASSESSMENT — ACTIVITIES OF DAILY LIVING (ADL)
ADLS_ACUITY_SCORE: 20

## 2025-05-24 NOTE — PROGRESS NOTES
Surgery Progress    S: doing great no pain no nausea, passed gas and liquid BM. Tolerated some liquids and food    O: /74 (BP Location: Left arm)   Pulse 62   Temp 98.3  F (36.8  C) (Oral)   Resp 16   Ht 1.829 m (6')   Wt 95.6 kg (210 lb 12.8 oz)   SpO2 95%   BMI 28.59 kg/m    Appears well  Abdomen is soft, nondistended and nontender    A/P SBO resolved -?adhesion  Diet advanced. Discussed could consider a couple days of lower fiber diet but suspect quick transition to regular.  Discharge today  Magaly Castillo MD

## 2025-05-24 NOTE — PLAN OF CARE
Goal Outcome Evaluation:      Plan of Care Reviewed With: patient    Overall Patient Progress: improvingOverall Patient Progress: improving    Outcome Evaluation: Tolerating diet. Discharging home. Low fiber diet. Will follow up with PCP. Pt curious whether to continue metamucil which is fiber containing, I talked generally with another physician who thought it would be appropriate to hold and resume with PCP guidance. PIV out. Family at bedside, who will also transport.        Problem: Adult Inpatient Plan of Care  Goal: Plan of Care Review  Description: The Plan of Care Review/Shift note should be completed every shift.  The Outcome Evaluation is a brief statement about your assessment that the patient is improving, declining, or no change.  This information will be displayed automatically on your shift  note.  5/24/2025 1312 by Yoshi Barboza, RN  Outcome: Met  Flowsheets (Taken 5/24/2025 1312)  Outcome Evaluation: Tolerating diet. Discharging home. Low fiber diet. Will follow up with PCP. Pt curious whether to continue metamucil which is fiber containing, I talked generally with another physician who thought it would be appropriate to hold and resume with PCP guidance. PIV out. Family at bedside, who will also transport.  Plan of Care Reviewed With: patient  Overall Patient Progress: improving  5/24/2025 0756 by Yoshi Barboza, RN  Outcome: Progressing  Flowsheets (Taken 5/24/2025 0756)  Outcome Evaluation: Denies pain. Up ad tita. Small BM this am. Somewhat tender abdomen to moderate pressure. Hypoactive bowel sounds of L quadrents, normal of R. Reviewed Low Fiber diet with patient and gave print out. Pt upgraded to low fiber diet this morning - anticipating discharge late morning/early afternoon. Will follow up with PCP. PIV , NS @ 100.  Plan of Care Reviewed With: patient  Overall Patient Progress: improving  Goal: Patient-Specific Goal (Individualized)  Description: You can add care plan individualizations to  Will you please call the patient and let him know that his hemoglobin and hematocrit continue to be low (hemoglobin 8.1 and hematocrit 25.5).  The hemoglobin is higher than it was when he was in the hospital 7 days ago.  The platelet levels are elevated at 466, which is likely related to his transfusion.    His potassium is in normal range at 4.4, which is increased from 3.4 9 days ago.    All of his iron levels are good.    I will enter orders for him to repeat a BMP CBC and iron prior to his follow-up on 2/20/2020.  Nonfasting.    Thank you,    JOSÉ MIGUEL Siddiqui.   "a care plan. Examples of Individualization might be:  \"Parent requests to be called daily at 9am for status\", \"I have a hard time hearing out of my right ear\", or \"Do not touch me to wake me up as it startles  me\".  5/24/2025 1312 by Yoshi Barboza RN  Outcome: Met  5/24/2025 0756 by Yoshi Barboza RN  Outcome: Progressing  Goal: Absence of Hospital-Acquired Illness or Injury  5/24/2025 1312 by Yoshi Barboza RN  Outcome: Met  5/24/2025 0756 by Yoshi Barboza RN  Outcome: Progressing  Intervention: Identify and Manage Fall Risk  Recent Flowsheet Documentation  Taken 5/24/2025 0747 by Yoshi Barboza RN  Safety Promotion/Fall Prevention:   lighting adjusted   mobility aid in reach   clutter free environment maintained  Intervention: Prevent Skin Injury  Recent Flowsheet Documentation  Taken 5/24/2025 0747 by Yoshi Barboza RN  Body Position: position changed independently  Goal: Optimal Comfort and Wellbeing  5/24/2025 1312 by Yoshi Barboza RN  Outcome: Met  5/24/2025 0756 by Yoshi Barboza RN  Outcome: Progressing  Goal: Readiness for Transition of Care  5/24/2025 1312 by Yoshi Barboza RN  Outcome: Met  5/24/2025 0756 by Yoshi Barboza RN  Outcome: Progressing     Problem: Skin Injury Risk Increased  Goal: Skin Health and Integrity  5/24/2025 1312 by Yoshi Barboza RN  Outcome: Met  5/24/2025 0756 by Yoshi Barboza RN  Outcome: Progressing  Intervention: Plan: Nurse Driven Intervention: Moisture Management  Recent Flowsheet Documentation  Taken 5/24/2025 0800 by Yoshi Barboza RN  Moisture Interventions: Encourage regular toileting  Intervention: Optimize Skin Protection  Recent Flowsheet Documentation  Taken 5/24/2025 0747 by Yoshi Barboza RN  Activity Management: activity adjusted per tolerance       "

## 2025-05-24 NOTE — PLAN OF CARE
"Goal Outcome Evaluation:      Plan of Care Reviewed With: patient    Overall Patient Progress: improvingOverall Patient Progress: improving    A&Ox4. VSS. On RA. NS infusing @ 100 ml/hr. Pt denies pain. Prn tylenol x1 given for headache. Voiding. Tolerating clear liquid. Independent in room.   Problem: Adult Inpatient Plan of Care  Goal: Plan of Care Review  Description: The Plan of Care Review/Shift note should be completed every shift.  The Outcome Evaluation is a brief statement about your assessment that the patient is improving, declining, or no change.  This information will be displayed automatically on your shift  note.  Outcome: Progressing  Flowsheets (Taken 5/24/2025 0506)  Plan of Care Reviewed With: patient  Overall Patient Progress: improving  Goal: Patient-Specific Goal (Individualized)  Description: You can add care plan individualizations to a care plan. Examples of Individualization might be:  \"Parent requests to be called daily at 9am for status\", \"I have a hard time hearing out of my right ear\", or \"Do not touch me to wake me up as it startles  me\".  Outcome: Progressing  Goal: Absence of Hospital-Acquired Illness or Injury  Outcome: Progressing  Intervention: Identify and Manage Fall Risk  Recent Flowsheet Documentation  Taken 5/23/2025 2030 by Edward Tiwari RN  Safety Promotion/Fall Prevention:   assistive device/personal items within reach   clutter free environment maintained   room near nurse's station   room organization consistent   safety round/check completed   nonskid shoes/slippers when out of bed  Intervention: Prevent Skin Injury  Recent Flowsheet Documentation  Taken 5/23/2025 2030 by Edward Tiwari RN  Body Position: position changed independently  Intervention: Prevent Infection  Recent Flowsheet Documentation  Taken 5/23/2025 2030 by Edward Tiwari RN  Infection Prevention:   cohorting utilized   hand hygiene promoted   personal protective equipment utilized   rest/sleep " promoted   equipment surfaces disinfected   single patient room provided  Goal: Optimal Comfort and Wellbeing  Outcome: Progressing  Goal: Readiness for Transition of Care  Outcome: Progressing     Problem: Skin Injury Risk Increased  Goal: Skin Health and Integrity  Outcome: Progressing  Intervention: Optimize Skin Protection  Recent Flowsheet Documentation  Taken 5/23/2025 2030 by Edward Tiwari, RN  Activity Management: activity adjusted per tolerance  Head of Bed (HOB) Positioning: HOB at 20-30 degrees

## 2025-05-24 NOTE — DISCHARGE SUMMARY
[facility]  Hospitalist Discharge Summary      Date of Admission:  5/22/2025  Date of Discharge:  5/24/2025  Discharging Provider: Swapnil Valverde MD  Discharge Service: Hospitalist Service    Discharge Diagnoses    Small bowel obstruction, mechanical in nature.  -unclear etiology   Clinically Significant Risk Factors     # Overweight: Estimated body mass index is 28.59 kg/m  as calculated from the following:    Height as of this encounter: 1.829 m (6').    Weight as of this encounter: 95.6 kg (210 lb 12.8 oz).       Follow-ups Needed After Discharge   Additional follow-up instructions/to-do's for PCP    : routine     Unresulted Labs Ordered in the Past 30 Days of this Admission       No orders found from 4/22/2025 to 5/23/2025.        These results will be followed up by      Discharge Disposition   Discharged to home  Condition at discharge: Stable    Hospital Course   Nahum Chiu is a 44 year old male admitted on 5/22/2025. He has a past medical history of acid reflux disease, status post endoscopy recently, anxiety disorder.     Patient is being admitted to the hospital with complaints of abdominal pain and is noted to have a small bowel obstruction  1  Small bowel obstruction, mechanical in nature.  No prior history of such an event.  IV fluids, conservative, management and ADAT  No indication of NG tube    General Surgery consulted.     Spontaneous resolution  2.  Other chronic medical conditions, PTA meds   Discharge home          Diet: Clear Liquid Diet    DVT Prophylaxis: Pneumatic Compression Devices  [place] Catheter: Not present  Lines: None     Cardiac Monitoring: None  Code Status: Full Code      Consultations This Hospital Stay   None    Code Status   Full Code    Time Spent on this Encounter   I, Swapnil Valverde MD, personally saw the patient today and spent greater than 30 minutes discharging this patient.       Swapnil Valverde MD  [facility]  PEDIATRIC  [address]  [facility] [address]  Phone: [phone]  Fax: [phone]  ______________________________________________________________________    Physical Exam   Vital Signs: Temp: 98.3  F (36.8  C) Temp src: Oral BP: 113/74 Pulse: 62   Resp: 16 SpO2: 95 % O2 Device: None (Room air)    Weight: 210 lbs 12.8 oz  GI: No scars, normal bowel sounds, soft, non-distended, non-tender, no masses palpated, no hepatosplenomegally       Primary Care Physician   Roman Reilly    Discharge Orders   No discharge procedures on file.    Significant Results and Procedures   Results for orders placed or performed during the hospital encounter of 05/22/25   CT Abdomen Pelvis w Contrast    Narrative    EXAM: CT ABDOMEN PELVIS W CONTRAST  LOCATION: [facility]  DATE: 5/22/2025    INDICATION: Progressive upper abdominal pain.  COMPARISON: None available.  TECHNIQUE: CT scan of the abdomen and pelvis was performed after the injection of 100mL Isovue 370 intravenously. Multiplanar reformats were obtained. Dose reduction techniques were used.    FINDINGS:   LOWER CHEST: Basilar pulmonary opacities, likely atelectasis.    ABDOMEN/PELVIS:    HEPATOBILIARY: No suspicious focal hepatic lesion. No radiodense gallstones.    PANCREAS: No main pancreatic ductal dilatation or definite solid pancreatic mass.    SPLEEN: No splenomegaly.    ADRENAL GLANDS: No adrenal nodules.    KIDNEYS/BLADDER: No radiodense kidney/ureteral stones or hydronephrosis in either kidney.    BOWEL: The appendix is visualized and appears normal. Multiple dilated small bowel loops with transition point in the right midabdomen (series 3 image 130), findings worrisome for small bowel obstruction.    PERITONEUM: No evidence of free fluid in the abdomen and pelvis. No free peritoneal or portal venous gas.    PELVIC ORGANS: Unremarkable.    VASCULATURE: Unremarkable.    LYMPH NODES: No significant abdominopelvic  lymphadenopathy.    MUSCULOSKELETAL: No suspicious osseous lesion.      Impression    IMPRESSION:  1.  Multiple dilated small bowel loops with transition point in the right mid abdomen, findings worrisome for small bowel obstruction.     XR Abdomen 2 Views    Narrative    EXAM: XR ABDOMEN 2 VIEWS  LOCATION: [facility]  DATE: 5/23/2025    INDICATION: SBO follow up. Clinically better  COMPARISON: CT abdomen pelvis 5/22/2025      Impression    IMPRESSION: The bowel gas pattern is normal. Mild colonic fecal burden. Nothing for obstruction or free air. No evidence for renal stones. Pelvic phleboliths. No aggressive osseous lesion.       Discharge Medications   Current Discharge Medication List        CONTINUE these medications which have NOT CHANGED    Details   fluticasone (FLONASE) 50 MCG/ACT nasal spray Spray 2 sprays into both nostrils as needed.      pantoprazole (PROTONIX) 40 MG EC tablet Take 40 mg by mouth daily.      venlafaxine (EFFEXOR XR) 37.5 MG 24 hr capsule Take 1 capsule (37.5 mg) by mouth daily.  Qty: 90 capsule, Refills: 5    Associated Diagnoses: Recurrent major depression in complete remission           Allergies   No Known Allergies

## 2025-05-24 NOTE — PLAN OF CARE
"Goal Outcome Evaluation:      Plan of Care Reviewed With: patient    Overall Patient Progress: improvingOverall Patient Progress: improving    Outcome Evaluation: Denies pain. Up ad tita. Small BM this am. Somewhat tender abdomen to moderate pressure. Hypoactive bowel sounds of L quadrents, normal of R. Reviewed Low Fiber diet with patient and gave print out. Pt upgraded to low fiber diet this morning - anticipating discharge late morning/early afternoon. Will follow up with PCP. MERLYN , NS @ 100.          Problem: Adult Inpatient Plan of Care  Goal: Plan of Care Review  Description: The Plan of Care Review/Shift note should be completed every shift.  The Outcome Evaluation is a brief statement about your assessment that the patient is improving, declining, or no change.  This information will be displayed automatically on your shift  note.  Outcome: Progressing  Flowsheets (Taken 5/24/2025 6533)  Outcome Evaluation: Denies pain. Up ad tita. Small BM this am. Somewhat tender abdomen to moderate pressure. Hypoactive bowel sounds of L quadrents, normal of R. Reviewed Low Fiber diet with patient and gave print out. Pt upgraded to low fiber diet this morning - anticipating discharge late morning/early afternoon. Will follow up with PCP. MERLYN , NS @ 100.  Plan of Care Reviewed With: patient  Overall Patient Progress: improving  Goal: Patient-Specific Goal (Individualized)  Description: You can add care plan individualizations to a care plan. Examples of Individualization might be:  \"Parent requests to be called daily at 9am for status\", \"I have a hard time hearing out of my right ear\", or \"Do not touch me to wake me up as it startles  me\".  Outcome: Progressing  Goal: Absence of Hospital-Acquired Illness or Injury  Outcome: Progressing  Intervention: Identify and Manage Fall Risk  Recent Flowsheet Documentation  Taken 5/24/2025 3687 by Yoshi Barboza, RN  Safety Promotion/Fall Prevention:   lighting adjusted   mobility aid in " reach   clutter free environment maintained  Intervention: Prevent Skin Injury  Recent Flowsheet Documentation  Taken 5/24/2025 0747 by Yoshi Barboza, RN  Body Position: position changed independently  Goal: Optimal Comfort and Wellbeing  Outcome: Progressing  Goal: Readiness for Transition of Care  Outcome: Progressing     Problem: Skin Injury Risk Increased  Goal: Skin Health and Integrity  Outcome: Progressing  Intervention: Optimize Skin Protection  Recent Flowsheet Documentation  Taken 5/24/2025 0747 by Yoshi Barboza, RN  Activity Management: activity adjusted per tolerance

## 2025-05-27 ENCOUNTER — PATIENT OUTREACH (OUTPATIENT)
Dept: FAMILY MEDICINE | Facility: CLINIC | Age: 45
End: 2025-05-27
Payer: COMMERCIAL

## 2025-05-27 NOTE — TELEPHONE ENCOUNTER
ED / Discharge Outreach Protocol    Patient Contact    Attempt # 1    Was call answered?  No.  Left message on voicemail with information to call  back.     Madeline Zelaya R.N.

## 2025-06-04 ENCOUNTER — OFFICE VISIT (OUTPATIENT)
Dept: FAMILY MEDICINE | Facility: CLINIC | Age: 45
End: 2025-06-04
Attending: HOSPITALIST
Payer: COMMERCIAL

## 2025-06-04 VITALS
WEIGHT: 216 LBS | TEMPERATURE: 98.3 F | HEIGHT: 73 IN | SYSTOLIC BLOOD PRESSURE: 108 MMHG | HEART RATE: 76 BPM | BODY MASS INDEX: 28.63 KG/M2 | RESPIRATION RATE: 16 BRPM | OXYGEN SATURATION: 98 % | DIASTOLIC BLOOD PRESSURE: 70 MMHG

## 2025-06-04 DIAGNOSIS — K56.609 SMALL BOWEL OBSTRUCTION (H): Primary | ICD-10-CM

## 2025-06-04 PROCEDURE — 3074F SYST BP LT 130 MM HG: CPT | Performed by: FAMILY MEDICINE

## 2025-06-04 PROCEDURE — 1111F DSCHRG MED/CURRENT MED MERGE: CPT | Performed by: FAMILY MEDICINE

## 2025-06-04 PROCEDURE — 99213 OFFICE O/P EST LOW 20 MIN: CPT | Performed by: FAMILY MEDICINE

## 2025-06-04 PROCEDURE — 3078F DIAST BP <80 MM HG: CPT | Performed by: FAMILY MEDICINE

## 2025-06-04 NOTE — PROGRESS NOTES
"  Assessment & Plan     Small bowel obstruction (H)  Resolved.  Patient will contact me if having the similar symptoms again, okay to continue her low residue diet.      MED REC REQUIRED  Post Medication Reconciliation Status:  Discharge medications reconciled, continue medications without change  BMI  Estimated body mass index is 28.5 kg/m  as calculated from the following:    Height as of this encounter: 1.854 m (6' 1\").    Weight as of this encounter: 98 kg (216 lb).             José Miguel Sidhu is a 44 year old, presenting for the following health issues:  Hospital F/U        6/4/2025     1:15 PM   Additional Questions   Roomed by Joyce NANCE     [facility] Follow-up Visit:    Hospital/Nursing Home/IP Rehab Facility: [facility]  Most Recent Admission Date: 5/22/2025   Most Recent Admission Diagnosis: SBO (small bowel obstruction) (H) - K56.609     Most Recent Discharge Date: 5/24/2025   Most Recent Discharge Diagnosis: SBO (small bowel obstruction) (H) - K56.609   Do you have any other stressors you would like to discuss with your provider? No    Problems taking medications regularly:  None  Medication changes since discharge: None  Problems adhering to non-medication therapy:  None    Summary of hospitalization:  [facility] discharge summary reviewed  Diagnostic Tests/Treatments reviewed.  Follow up needed: none  Other Healthcare Providers Involved in Patient s Care:         None  Update since discharge: improved.         Plan of care communicated with patient     [place] 4-6 bowel movements daily.  No longer having abdominal pain or nausea.  Feeling well today.  He attributes this episode to eating two carrots.       Objective    /70 (Cuff Size: Adult Regular)   Pulse 76   Temp 98.3  F (36.8  C)   Resp 16   Ht 1.854 m (6' 1\")   Wt 98 kg (216 lb)   SpO2 98%   BMI 28.50 kg/m    Body mass index is 28.5 kg/m .  Physical Exam  Cardiovascular:      Rate " and Rhythm: Normal rate and regular rhythm.   Pulmonary:      Breath sounds: Normal breath sounds.   Abdominal:      General: Abdomen is flat. There is no distension.      Palpations: Abdomen is soft.      Tenderness: There is no abdominal tenderness.                    Signed Electronically by: Roman Reilly MD

## 2025-07-22 ENCOUNTER — PATIENT OUTREACH (OUTPATIENT)
Dept: CARE COORDINATION | Facility: CLINIC | Age: 45
End: 2025-07-22
Payer: COMMERCIAL

## 2025-08-05 ENCOUNTER — PATIENT OUTREACH (OUTPATIENT)
Dept: CARE COORDINATION | Facility: CLINIC | Age: 45
End: 2025-08-05
Payer: COMMERCIAL

## 2025-08-18 ENCOUNTER — OFFICE VISIT (OUTPATIENT)
Dept: FAMILY MEDICINE | Facility: CLINIC | Age: 45
End: 2025-08-18
Payer: COMMERCIAL

## 2025-08-18 VITALS
TEMPERATURE: 98.2 F | WEIGHT: 220 LBS | DIASTOLIC BLOOD PRESSURE: 88 MMHG | HEIGHT: 73 IN | HEART RATE: 73 BPM | BODY MASS INDEX: 29.16 KG/M2 | RESPIRATION RATE: 16 BRPM | OXYGEN SATURATION: 96 % | SYSTOLIC BLOOD PRESSURE: 119 MMHG

## 2025-08-18 DIAGNOSIS — Z00.00 ANNUAL PHYSICAL EXAM: Primary | ICD-10-CM

## 2025-08-18 LAB
CHOLEST SERPL-MCNC: 237 MG/DL
EST. AVERAGE GLUCOSE BLD GHB EST-MCNC: 108 MG/DL
FASTING STATUS PATIENT QL REPORTED: YES
HBA1C MFR BLD: 5.4 % (ref 0–5.6)
HDLC SERPL-MCNC: 52 MG/DL
LDLC SERPL CALC-MCNC: 156 MG/DL
NONHDLC SERPL-MCNC: 185 MG/DL
TRIGL SERPL-MCNC: 144 MG/DL
TSH SERPL DL<=0.005 MIU/L-ACNC: 1.66 UIU/ML (ref 0.3–4.2)

## 2025-08-18 PROCEDURE — 84443 ASSAY THYROID STIM HORMONE: CPT | Performed by: FAMILY MEDICINE

## 2025-08-18 PROCEDURE — 83036 HEMOGLOBIN GLYCOSYLATED A1C: CPT | Performed by: FAMILY MEDICINE

## 2025-08-18 PROCEDURE — 80061 LIPID PANEL: CPT | Performed by: FAMILY MEDICINE

## 2025-08-18 PROCEDURE — 3044F HG A1C LEVEL LT 7.0%: CPT | Performed by: FAMILY MEDICINE

## 2025-08-18 PROCEDURE — 3079F DIAST BP 80-89 MM HG: CPT | Performed by: FAMILY MEDICINE

## 2025-08-18 PROCEDURE — 99396 PREV VISIT EST AGE 40-64: CPT | Performed by: FAMILY MEDICINE

## 2025-08-18 PROCEDURE — 3074F SYST BP LT 130 MM HG: CPT | Performed by: FAMILY MEDICINE

## 2025-08-18 PROCEDURE — 36415 COLL VENOUS BLD VENIPUNCTURE: CPT | Performed by: FAMILY MEDICINE

## 2025-08-18 SDOH — HEALTH STABILITY: PHYSICAL HEALTH: ON AVERAGE, HOW MANY DAYS PER WEEK DO YOU ENGAGE IN MODERATE TO STRENUOUS EXERCISE (LIKE A BRISK WALK)?: 2 DAYS

## 2025-08-18 ASSESSMENT — SOCIAL DETERMINANTS OF HEALTH (SDOH): HOW OFTEN DO YOU GET TOGETHER WITH FRIENDS OR RELATIVES?: ONCE A WEEK

## 2025-08-18 ASSESSMENT — ANXIETY QUESTIONNAIRES
GAD7 TOTAL SCORE: 0
7. FEELING AFRAID AS IF SOMETHING AWFUL MIGHT HAPPEN: NOT AT ALL
5. BEING SO RESTLESS THAT IT IS HARD TO SIT STILL: NOT AT ALL
GAD7 TOTAL SCORE: 0
3. WORRYING TOO MUCH ABOUT DIFFERENT THINGS: NOT AT ALL
7. FEELING AFRAID AS IF SOMETHING AWFUL MIGHT HAPPEN: NOT AT ALL
2. NOT BEING ABLE TO STOP OR CONTROL WORRYING: NOT AT ALL
GAD7 TOTAL SCORE: 0
6. BECOMING EASILY ANNOYED OR IRRITABLE: NOT AT ALL
1. FEELING NERVOUS, ANXIOUS, OR ON EDGE: NOT AT ALL
4. TROUBLE RELAXING: NOT AT ALL

## 2025-09-04 ENCOUNTER — E-VISIT (OUTPATIENT)
Dept: FAMILY MEDICINE | Facility: CLINIC | Age: 45
End: 2025-09-04
Payer: COMMERCIAL

## 2025-09-04 DIAGNOSIS — F33.42 RECURRENT MAJOR DEPRESSION IN COMPLETE REMISSION: ICD-10-CM

## 2025-09-04 RX ORDER — VENLAFAXINE HYDROCHLORIDE 75 MG/1
75 CAPSULE, EXTENDED RELEASE ORAL DAILY
Qty: 90 CAPSULE | Refills: 3 | Status: SHIPPED | OUTPATIENT
Start: 2025-09-04

## 2025-09-04 ASSESSMENT — ANXIETY QUESTIONNAIRES
7. FEELING AFRAID AS IF SOMETHING AWFUL MIGHT HAPPEN: SEVERAL DAYS
8. IF YOU CHECKED OFF ANY PROBLEMS, HOW DIFFICULT HAVE THESE MADE IT FOR YOU TO DO YOUR WORK, TAKE CARE OF THINGS AT HOME, OR GET ALONG WITH OTHER PEOPLE?: SOMEWHAT DIFFICULT
6. BECOMING EASILY ANNOYED OR IRRITABLE: SEVERAL DAYS
4. TROUBLE RELAXING: NOT AT ALL
3. WORRYING TOO MUCH ABOUT DIFFERENT THINGS: MORE THAN HALF THE DAYS
GAD7 TOTAL SCORE: 7
GAD7 TOTAL SCORE: 7
1. FEELING NERVOUS, ANXIOUS, OR ON EDGE: SEVERAL DAYS
7. FEELING AFRAID AS IF SOMETHING AWFUL MIGHT HAPPEN: SEVERAL DAYS
5. BEING SO RESTLESS THAT IT IS HARD TO SIT STILL: NOT AT ALL
IF YOU CHECKED OFF ANY PROBLEMS ON THIS QUESTIONNAIRE, HOW DIFFICULT HAVE THESE PROBLEMS MADE IT FOR YOU TO DO YOUR WORK, TAKE CARE OF THINGS AT HOME, OR GET ALONG WITH OTHER PEOPLE: SOMEWHAT DIFFICULT
2. NOT BEING ABLE TO STOP OR CONTROL WORRYING: MORE THAN HALF THE DAYS

## (undated) RX ORDER — FENTANYL CITRATE 50 UG/ML
INJECTION, SOLUTION INTRAMUSCULAR; INTRAVENOUS
Status: DISPENSED
Start: 2020-03-26